# Patient Record
Sex: FEMALE | Race: WHITE | NOT HISPANIC OR LATINO | Employment: OTHER | URBAN - METROPOLITAN AREA
[De-identification: names, ages, dates, MRNs, and addresses within clinical notes are randomized per-mention and may not be internally consistent; named-entity substitution may affect disease eponyms.]

---

## 2020-08-10 ENCOUNTER — HOSPITAL ENCOUNTER (OUTPATIENT)
Dept: MAMMOGRAPHY | Facility: HOSPITAL | Age: 75
Discharge: HOME | End: 2020-08-10

## 2020-08-27 ENCOUNTER — HOSPITAL ENCOUNTER (OUTPATIENT)
Dept: MAMMOGRAPHY | Facility: HOSPITAL | Age: 75
Discharge: HOME | End: 2020-08-27

## 2020-08-27 ENCOUNTER — OFFICE VISIT CONVERTED (OUTPATIENT)
Dept: ORTHOPEDIC SURGERY | Facility: CLINIC | Age: 75
End: 2020-08-27
Attending: ORTHOPAEDIC SURGERY

## 2020-09-14 ENCOUNTER — HOSPITAL ENCOUNTER (OUTPATIENT)
Dept: OTHER | Facility: HOSPITAL | Age: 75
Discharge: HOME | End: 2020-09-14
Attending: ORTHOPAEDIC SURGERY

## 2020-09-22 ENCOUNTER — OFFICE VISIT CONVERTED (OUTPATIENT)
Dept: ORTHOPEDIC SURGERY | Facility: CLINIC | Age: 75
End: 2020-09-22
Attending: ORTHOPAEDIC SURGERY

## 2021-01-12 ENCOUNTER — OFFICE VISIT CONVERTED (OUTPATIENT)
Dept: ORTHOPEDIC SURGERY | Facility: CLINIC | Age: 76
End: 2021-01-12
Attending: ORTHOPAEDIC SURGERY

## 2021-01-12 ENCOUNTER — CONVERSION ENCOUNTER (OUTPATIENT)
Dept: ORTHOPEDIC SURGERY | Facility: CLINIC | Age: 76
End: 2021-01-12

## 2021-02-03 ENCOUNTER — CONVERSION ENCOUNTER (OUTPATIENT)
Dept: PREADMISSION TESTING | Facility: HOSPITAL | Age: 76
End: 2021-02-03

## 2021-02-03 ENCOUNTER — HOSPITAL ENCOUNTER (OUTPATIENT)
Dept: FAMILY MEDICINE CLINIC | Facility: CLINIC | Age: 76
Discharge: HOME | End: 2021-02-03
Attending: FAMILY MEDICINE

## 2021-02-03 LAB — SARS-COV-2 RNA SPEC QL NAA+PROBE: NOT DETECTED

## 2021-02-08 ENCOUNTER — HOSPITAL ENCOUNTER (OUTPATIENT)
Dept: PERIOP | Facility: HOSPITAL | Age: 76
Setting detail: HOSPITAL OUTPATIENT SURGERY
Discharge: HOME | End: 2021-02-08
Attending: ORTHOPAEDIC SURGERY

## 2021-02-23 ENCOUNTER — OFFICE VISIT CONVERTED (OUTPATIENT)
Dept: ORTHOPEDIC SURGERY | Facility: CLINIC | Age: 76
End: 2021-02-23
Attending: ORTHOPAEDIC SURGERY

## 2021-03-23 ENCOUNTER — OFFICE VISIT CONVERTED (OUTPATIENT)
Dept: ORTHOPEDIC SURGERY | Facility: CLINIC | Age: 76
End: 2021-03-23
Attending: ORTHOPAEDIC SURGERY

## 2021-05-04 ENCOUNTER — CONVERSION ENCOUNTER (OUTPATIENT)
Dept: OTHER | Facility: HOSPITAL | Age: 76
End: 2021-05-04

## 2021-05-04 ENCOUNTER — OFFICE VISIT CONVERTED (OUTPATIENT)
Dept: ORTHOPEDIC SURGERY | Facility: CLINIC | Age: 76
End: 2021-05-04
Attending: PHYSICIAN ASSISTANT

## 2021-05-10 NOTE — H&P
History and Physical      Patient Name: Noni Bhardwaj   Patient ID: [id number]   Sex: Female   YOB: 1945        Visit Date: August 27, 2020    Provider: Linus Ngo MD   Location: [facility]   Location Address: [address] [place] [address]  [id number]   Location Phone: [phone]          Chief Complaint  · Right shoulder pain      History Of Present Illness  Noni Bhardwaj is a 75 year old /White female who presents today to [facility].      The patient presents today for evaluation of right shoulder pain. Patient reports having a partial tear of her RTC for some time. She is from [place] and had injections there which helped her pain. She reports moving here to have help with her  who has dementia. She reports trying to use a chainsaw recently and has had increasing pain, this has not improved. She reports pain over the posterolateral shoulder and anterior shoulder. She reports the last MRI she had was in December in [place].                      Past Surgical History  Appendectomy; Back; Colonoscopy; Hysterectomy         Medication List  carisoprodol-aspirin 200-325 mg oral tablet; Eliquis oral; levothyroxine oral         Allergy List  NO KNOWN DRUG ALLERGIES; NO KNOWN DRUG ALLERGIES       Allergies Reconciled  Social History  Alcohol Use (Current some day); lives with spouse; .; Recreational Drug Use (Never); Retired.; Tobacco (Never)         Review of Systems  · Constitutional  o Denies  o : fever, chills, weight loss  · Cardiovascular  o Denies  o : chest pain, shortness of breath  · Gastrointestinal  o Denies  o : liver disease, heartburn, nausea, blood in stools  · Genitourinary  o Denies  o : painful urination, blood in urine  · Integument  o Denies  o : rash, itching  · Neurologic  o Denies  o : headache, weakness, loss of consciousness  · Musculoskeletal  o Denies  o : painful, swollen joints  · Psychiatric  o Denies  o : drug/alcohol  "addiction, anxiety, depression      Vitals  Date Time BP Position Site L\R Cuff Size HR RR TEMP (F) WT  HT  BMI kg/m2 BSA m2 O2 Sat        08/27/2020 09:57 AM         156lbs 4oz 5'  2\" 28.58 1.76           Physical Examination  · Constitutional  o Appearance  o : well developed, well-nourished, no obvious deformities present  · Head and Face  o Head  o :   § Inspection  § : normocephalic  o Face  o :   § Inspection  § : no facial lesions  · Eyes  o Conjunctivae  o : conjunctivae normal  o Sclerae  o : sclerae white  · Ears, Nose, Mouth and Throat  o Ears  o :   § External Ears  § : appearance within normal limits  § Hearing  § : intact  o Nose  o :   § External Nose  § : appearance normal  · Neck  o Inspection/Palpation  o : normal appearance  o Range of Motion  o : full range of motion  · Respiratory  o Respiratory Effort  o : breathing unlabored  o Inspection of Chest  o : normal appearance  o Auscultation of Lungs  o : no audible wheezing or rales  · Cardiovascular  o Heart  o : regular rate  · Gastrointestinal  o Abdominal Examination  o : soft and non-tender  · Skin and Subcutaneous Tissue  o General Inspection  o : intact, no rashes  · Psychiatric  o General  o : Alert and oriented x3  o Judgement and Insight  o : judgment and insight intact  o Mood and Affect  o : mood normal, affect appropriate  · Right Shoulder  o Inspection  o : Non-tender to palpation, no swelling or atrophy, forward elevation active 90 degrees, passive forward elevation 165, abduction 100 degrees, ER 65, and IR 30 degrees, Positive impingement signs, IR to SI joint, Neurovascularly intact, sensation intact, 5/5 infraspinatus and subscapularis, supraspinatus 4+/5, pain with cross arm adduction  · Injection Note/Aspiration Note  o Site  o : right shoulder  o Procedure  o : Procedure: After educating the patient, patient gave consent for procedure. After using Chloraprep, the joint space was injected. The patient tolerated the procedure " well.  o Medication  o : 80 mg of DepoMedrol with 9cc of 1% Lidocaine  · In Office Procedures  o View  o : AP/LATERAL  o Site  o : right shoulder   o Indication  o : right shoulder pain  o Study  o : X-rays ordered, taken in the office, and reviewed today.  o Xray  o : Reveals calcification 1cm x 3cm adjacent to the lateral acromion, advanced degenerative changes to the acromioclavicular joint, calcification 2cm x 1cm adjacent to proximal humerus  o Comparative Data  o : No comparative data found          Assessment  · Primary osteoarthritis of shoulder     715.11/M19.019  · Right shoulder pain, unspecified chronicity     719.41/M25.511  · Rotator cuff tear     840.4/M75.100      Plan  · Orders  o Depo-Medrol injection 80mg () - - 08/27/2020   Lot [id number] Exp 07 2021 [employer] Administered by BIANCA REN MD   o Shoulder Intra-articular Injection without US Guidance [facility] (23342) - - 08/27/2020   Lot [id number] Exp 08 01 2021 [employer] Administered by BIANCA REN MD   o Scapula (Right) [facility] Preferred View (11369-VH) - 719.41/M25.511 - 08/27/2020  · Medications  o Medications have been Reconciled  o Transition of Care or Provider Policy  · Instructions  o Reviewed the patient's Past Medical, Social, and Family history as well as the ROS at today's visit, no changes.  o Call or return if worsening symptoms.  o X-ray ordered, taken and reviewed at this visit.  o The above service was scribed by Kendal Bardales on my behalf and I attest to the accuracy of the note. jsb  o Discussed treatment options. We recommended an MRI to evaluate the RTC and injection today to help with her pain. She expressed understanding and wished to proceed with injection and MRI. She will follow-up after MRI to discuss results.             Electronically Signed by: Kendal Bardales-, Other -Author on August 27, 2020 10:59:42 AM  Electronically Co-signed by: Bianca Ren MD -Reviewer on August 27, 2020  08:56:35 PM

## 2021-05-13 NOTE — PROGRESS NOTES
Progress Note      Patient Name: Noni Bhardwaj   Patient ID: [id number]   Sex: Female   YOB: 1945        Visit Date: September 22, 2020    Provider: Linus Ngo MD   Location: [facility]   Location Address: [address] [place] [address]  [id number]   Location Phone: [phone]          Chief Complaint  · Right shoulder pain      History Of Present Illness  Noni Bhardwaj is a 75 year old /White female who presents today to [facility].      The patient presents here today for follow up evaluation of right shoulder pain. We previously gave the patient an injection in her right shoulder, she states the injection improved her pain. She is here today for her MRI results, that revealed a Rotator cuff tear. She has received injections in the past in [place], where she lived before. She states the injections usually last her up about 6 months.           Past Surgical History  Appendectomy; Back; Colonoscopy; Hysterectomy         Medication List  carisoprodol-aspirin 200-325 mg oral tablet; Eliquis oral; levothyroxine oral         Allergy List  NO KNOWN DRUG ALLERGIES; NO KNOWN DRUG ALLERGIES       Allergies Reconciled  Social History  Alcohol Use (Current some day); lives with spouse; .; Recreational Drug Use (Never); Retired.; Tobacco (Never)         Review of Systems  · Constitutional  o Denies  o : fever, chills, weight loss  · Cardiovascular  o Denies  o : chest pain, shortness of breath  · Gastrointestinal  o Denies  o : liver disease, heartburn, nausea, blood in stools  · Genitourinary  o Denies  o : painful urination, blood in urine  · Integument  o Denies  o : rash, itching  · Neurologic  o Denies  o : headache, weakness, loss of consciousness  · Musculoskeletal  o Denies  o : painful, swollen joints  · Psychiatric  o Denies  o : drug/alcohol addiction, anxiety, depression      Vitals  Date Time BP Position Site L\R Cuff Size HR RR TEMP (F) WT  HT   "BMI kg/m2 BSA m2 O2 Sat        09/22/2020 10:43 AM         156lbs 4oz 5'  2\" 28.58 1.76           Physical Examination  · Constitutional  o Appearance  o : well developed, well-nourished, no obvious deformities present  · Head and Face  o Head  o :   § Inspection  § : normocephalic  o Face  o :   § Inspection  § : no facial lesions  · Eyes  o Conjunctivae  o : conjunctivae normal  o Sclerae  o : sclerae white  · Ears, Nose, Mouth and Throat  o Ears  o :   § External Ears  § : appearance within normal limits  § Hearing  § : intact  o Nose  o :   § External Nose  § : appearance normal  · Neck  o Inspection/Palpation  o : normal appearance  o Range of Motion  o : full range of motion  · Respiratory  o Respiratory Effort  o : breathing unlabored  o Inspection of Chest  o : normal appearance  o Auscultation of Lungs  o : no audible wheezing or rales  · Cardiovascular  o Heart  o : regular rate  · Gastrointestinal  o Abdominal Examination  o : soft and non-tender  · Skin and Subcutaneous Tissue  o General Inspection  o : intact, no rashes  · Psychiatric  o General  o : Alert and oriented x3  o Judgement and Insight  o : judgment and insight intact  o Mood and Affect  o : mood normal, affect appropriate  · Right Shoulder  o Inspection  o : Non-tender to palpation, no swelling or atrophy, forward elevation active 90 degrees, passive forward elevation 165, abduction 100 degrees, ER 65, and IR 30 degrees, Positive impingement signs, IR to SI joint, Neurovascularly intact, sensation intact, 5/5 infraspinatus and subscapularis, supraspinatus 4+/5, pain with cross arm adduction           Assessment  · Right shoulder pain, unspecified chronicity     719.41/M25.511  · Rotator cuff tear, right     840.4/M75.101      Plan  · Medications  o Medications have been Reconciled  o Transition of Care or Provider Policy  · Instructions  o MRI reviewed by Dr. Ngo.  o Reviewed the patient's Past Medical, Social, and Family history " as well as the ROS at today's visit, no changes.  o Call or return if worsening symptoms.  o Follow up as needed.  o The above service was scribed by Kristen Machado on my behalf and I attest to the accuracy of the note. jsb  o Discussed operative treatment options vs non operative treatment options. The patient wished to proceed with conservative treatment and continue getting injections as long as it is helping. Due to patient being on Eliquis, and not being able to take anti-inflammatories, a prescription for tramadol was given today. Follow up as needed for another injection.   o Electronically Identified Patient Education Materials Provided Electronically            Electronically Signed by: Kristen Machado MA -Author on September 22, 2020 10:58:38 AM  Electronically Co-signed by: Linus Ngo MD -Reviewer on September 22, 2020 09:24:35 PM

## 2021-05-14 VITALS — HEIGHT: 62 IN | WEIGHT: 156.25 LBS | BODY MASS INDEX: 28.75 KG/M2

## 2021-05-14 VITALS — OXYGEN SATURATION: 94 % | BODY MASS INDEX: 26.46 KG/M2 | HEIGHT: 63 IN | WEIGHT: 149.37 LBS | HEART RATE: 61 BPM

## 2021-05-14 VITALS — HEIGHT: 63 IN | BODY MASS INDEX: 25.75 KG/M2 | WEIGHT: 145.31 LBS

## 2021-05-14 VITALS — HEIGHT: 63 IN | BODY MASS INDEX: 27.11 KG/M2 | OXYGEN SATURATION: 98 % | HEART RATE: 80 BPM | WEIGHT: 153 LBS

## 2021-05-14 VITALS — WEIGHT: 156.25 LBS | HEIGHT: 62 IN | BODY MASS INDEX: 28.75 KG/M2

## 2021-05-14 NOTE — PROGRESS NOTES
Progress Note      Patient Name: Noni Bhardwaj   Patient ID: [id number]   Sex: Female   YOB: 1945        Visit Date: January 12, 2021    Provider: Linus Ngo MD   Location: [facility]   Location Address: [address] [place] [address]  [id number]   Location Phone: [phone]          Chief Complaint  · Right shoulder pain       History Of Present Illness  Noni Bharwdaj is a 75 year old /White female who presents today to [facility].      The patient presents here today for follow up evaluation of Right shoulder.  The patient has been getting injections in her shoulder for her rotator cuff tear. Patient states her shoulder is bothering her more. She can't sleep on that side at night. She states she is ready to get it fixed. She does see a cardiologist.            Past Surgical History  Appendectomy; Back; Colonoscopy; Hysterectomy         Medication List  carisoprodol-aspirin 200-325 mg oral tablet; Eliquis oral; levothyroxine oral; Ultram 50 mg oral tablet         Allergy List  NO KNOWN DRUG ALLERGIES; NO KNOWN DRUG ALLERGIES       Allergies Reconciled  Social History  Alcohol Use (Current some day); lives with spouse; .; Recreational Drug Use (Never); Retired.; Tobacco (Never)         Review of Systems  · Constitutional  o Denies  o : fever, chills, weight loss  · Cardiovascular  o Denies  o : chest pain, shortness of breath  · Gastrointestinal  o Denies  o : liver disease, heartburn, nausea, blood in stools  · Genitourinary  o Denies  o : painful urination, blood in urine  · Integument  o Denies  o : rash, itching  · Neurologic  o Denies  o : headache, weakness, loss of consciousness  · Musculoskeletal  o Denies  o : painful, swollen joints  · Psychiatric  o Denies  o : drug/alcohol addiction, anxiety, depression      Vitals  Date Time BP Position Site L\R Cuff Size HR RR TEMP (F) WT  HT  BMI kg/m2 BSA m2 O2 Sat FR L/min FiO2 HC       01/12/2021  "03:05 PM      80 - R   153lbs 0oz 5'  3\" 27.1 1.76 98 %            Physical Examination  · Constitutional  o Appearance  o : well developed, well-nourished, no obvious deformities present  · Head and Face  o Head  o :   § Inspection  § : normocephalic  o Face  o :   § Inspection  § : no facial lesions  · Eyes  o Conjunctivae  o : conjunctivae normal  o Sclerae  o : sclerae white  · Ears, Nose, Mouth and Throat  o Ears  o :   § External Ears  § : appearance within normal limits  § Hearing  § : intact  o Nose  o :   § External Nose  § : appearance normal  · Neck  o Inspection/Palpation  o : normal appearance  o Range of Motion  o : full range of motion  · Respiratory  o Respiratory Effort  o : breathing unlabored  o Inspection of Chest  o : normal appearance  o Auscultation of Lungs  o : no audible wheezing or rales  · Cardiovascular  o Heart  o : regular rate  · Gastrointestinal  o Abdominal Examination  o : soft and non-tender  · Skin and Subcutaneous Tissue  o General Inspection  o : intact, no rashes  · Psychiatric  o General  o : Alert and oriented x3  o Judgement and Insight  o : judgment and insight intact  o Mood and Affect  o : mood normal, affect appropriate  · Right Shoulder  o Inspection  o : Non-tender to palpation, no swelling or atrophy, forward elevation active 90 degrees, passive forward elevation 165, abduction 100 degrees, ER 65, and IR 30 degrees, Positive impingement signs, IR to SI joint, Neurovascularly intact, sensation intact, 5/5 infraspinatus and subscapularis, supraspinatus 4+/5, pain with cross arm adduction           Assessment  · Right shoulder pain, unspecified chronicity     719.41/M25.511  · Right Rotator cuff tear     840.4/M75.100      Plan  · Medications  o Medications have been Reconciled  o Transition of Care or Provider Policy  · Instructions  o Reviewed the patient's Past Medical, Social, and Family history as well as the ROS at today's visit, no changes.  o Call or return if " worsening symptoms.  o Discussed surgery.  o Risks/benefits discussed with patient including, but not limited to: infection, bleeding, neurovascular damage, malunion, nonunion, aesthetic deformity, need for further surgery, and death.  o Surgery pamphlet given.  o The above service was scribed by Kristen Machado on my behalf and I attest to the accuracy of the note. jsb  o Discussed the risks and benefits of operative treatment. The patient expressed understanding and wished to proceed. Plan for a right shoulder arthroscopic vs mini open rotator cuff repair, SAD, with possible biceps tenodesis. Follow up 2 weeks post-op.   o Electronically Identified Patient Education Materials Provided Electronically            Electronically Signed by: Kristen Mcahado MA -Author on January 12, 2021 03:47:06 PM  Electronically Co-signed by: Linus Ngo MD -Reviewer on January 12, 2021 09:03:53 PM

## 2021-05-14 NOTE — PROGRESS NOTES
Progress Note      Patient Name: Noni Bhardwaj   Patient ID: [id number]   Sex: Female   YOB: 1945        Visit Date: March 23, 2021    Provider: Linus Ngo MD   Location: [facility]   Location Address: [address] [place] [address]  [id number]   Location Phone: [phone]          Chief Complaint  · Right shoulder pain      History Of Present Illness  Noni Bhardwaj is a 76 year old /White female who presents today to [facility].      The patient presents here today for follow up evaluation of her right shoulder. She is S/P Right shoulder arthroscopic subacromial decompression, biceps tenotomy, mini open rotator cuff repair, 2/8/2021.  The patient states she has been wearing the sling. She states she has been attending physical therapy. The patient reports she is still having some pain with Elevation. She has no other complaints today.  She is not taking any pain medication.       Past Surgical History  Appendectomy; Back; Colonoscopy; Hysterectomy         Medication List  carisoprodol-aspirin 200-325 mg oral tablet; Eliquis oral; levothyroxine oral; Ultram 50 mg oral tablet         Allergy List  NO KNOWN DRUG ALLERGIES; NO KNOWN DRUG ALLERGIES       Allergies Reconciled  Social History  Alcohol Use (Current some day); lives with spouse; .; Recreational Drug Use (Never); Retired.; Tobacco (Never)         Review of Systems  · Constitutional  o Denies  o : fever, chills, weight loss  · Cardiovascular  o Denies  o : chest pain, shortness of breath  · Gastrointestinal  o Denies  o : liver disease, heartburn, nausea, blood in stools  · Genitourinary  o Denies  o : painful urination, blood in urine  · Integument  o Denies  o : rash, itching  · Neurologic  o Denies  o : headache, weakness, loss of consciousness  · Musculoskeletal  o Denies  o : painful, swollen joints  · Psychiatric  o Denies  o : drug/alcohol addiction, anxiety,  "depression      Vitals  Date Time BP Position Site L\R Cuff Size HR RR TEMP (F) WT  HT  BMI kg/m2 BSA m2 O2 Sat FR L/min FiO2 HC       03/23/2021 10:10 AM         145lbs 5oz 5'  3\" 25.74 1.71             Physical Examination  · Constitutional  o Appearance  o : well developed, well-nourished, no obvious deformities present  · Head and Face  o Head  o :   § Inspection  § : normocephalic  o Face  o :   § Inspection  § : no facial lesions  · Eyes  o Conjunctivae  o : conjunctivae normal  o Sclerae  o : sclerae white  · Ears, Nose, Mouth and Throat  o Ears  o :   § External Ears  § : appearance within normal limits  § Hearing  § : intact  o Nose  o :   § External Nose  § : appearance normal  · Neck  o Inspection/Palpation  o : normal appearance  o Range of Motion  o : full range of motion  · Respiratory  o Respiratory Effort  o : breathing unlabored  o Inspection of Chest  o : normal appearance  o Auscultation of Lungs  o : no audible wheezing or rales  · Cardiovascular  o Heart  o : regular rate  · Gastrointestinal  o Abdominal Examination  o : soft and non-tender  · Skin and Subcutaneous Tissue  o General Inspection  o : intact, no rashes  · Psychiatric  o General  o : Alert and oriented x3  o Judgement and Insight  o : judgment and insight intact  o Mood and Affect  o : mood normal, affect appropriate  · Right Shoulder  o Inspection  o : Incisions well healing. Sensation to light touch median, radial, ulnar nerve. Positive AIN, PIN, ulnar nerve. Positive pulses. Neurovascularly intact. FE 90. Abduction 90. ER 40.           Assessment  · Aftercare following Right shoulder arthroscopic subacromial decompression, biceps tenotomy, mini open rotator cuff repair     V54.81  · Right shoulder pain, unspecified chronicity     719.41/M25.511      Plan  · Medications  o Medications have been Reconciled  o Transition of Care or Provider Policy  · Instructions  o Reviewed the patient's Past Medical, Social, and Family history as " well as the ROS at today's visit, no changes.  o Call or return if worsening symptoms.  o The above service was scribed by Kristen Machado on my behalf and I attest to the accuracy of the note. jsb  o Discussed the treatment plan with the patient. Plan to continue with more aggressive physical therapy. She can remove the sling, no lifting. Follow up in 6 weeks to recheck.  o Electronically Identified Patient Education Materials Provided Electronically            Electronically Signed by: Kristen Machado MA -Author on March 23, 2021 10:54:33 AM  Electronically Co-signed by: Linus Ngo MD -Reviewer on March 23, 2021 08:00:53 PM

## 2021-05-14 NOTE — PROGRESS NOTES
Progress Note      Patient Name: Noni Bhardwaj   Patient ID: [id number]   Sex: Female   YOB: 1945        Visit Date: May 4, 2021    Provider: Hardik Lopez PA-C   Location: [facility]   Location Address: [address] [place] [address]  [id number]   Location Phone: [phone]          Chief Complaint  · Follow up right shoulder surgery      History Of Present Illness  Noni Bhardwaj is a 76 year old /White female who presents today to [facility]. Patient presents for follow-up evaluation of right shoulder arthroscopic subacromial decompression, bicep tenotomy, mini open rotator cuff repair, 2/8/2021. Patient states she has been doing therapy with good progress, she states her range of motion and strength have improved, she brings a therapy note that states that patient has been discharged from therapy and will now do home exercise program. Patient is in agreement with this plan. Patient denies pain in the shoulder, states she has good use of the shoulder with activities of daily living.       Past Surgical History  Appendectomy; Back; Colonoscopy; Hysterectomy         Medication List  carisoprodol-aspirin 200-325 mg oral tablet; Eliquis oral; levothyroxine oral; Ultram 50 mg oral tablet         Allergy List  NO KNOWN DRUG ALLERGIES; NO KNOWN DRUG ALLERGIES       Allergies Reconciled  Social History  Alcohol Use (Current some day); lives with spouse; .; Recreational Drug Use (Never); Retired.; Tobacco (Never)         Review of Systems  · Constitutional  o Denies  o : fever, chills, weight loss  · Cardiovascular  o Denies  o : chest pain, shortness of breath  · Gastrointestinal  o Denies  o : liver disease, heartburn, nausea, blood in stools  · Genitourinary  o Denies  o : painful urination, blood in urine  · Integument  o Denies  o : rash, itching  · Neurologic  o Denies  o : headache, weakness, loss of  "consciousness  · Musculoskeletal  o Denies  o : painful, swollen joints  · Psychiatric  o Denies  o : drug/alcohol addiction, anxiety, depression      Vitals  Date Time BP Position Site L\R Cuff Size HR RR TEMP (F) WT  HT  BMI kg/m2 BSA m2 O2 Sat FR L/min FiO2 HC       05/04/2021 10:17 AM      74 - R   145lbs 0oz 5'  3\" 25.69 1.71 98 %            Physical Examination  · Constitutional  o Appearance  o : well developed, well-nourished, no obvious deformities present  · Head and Face  o Head  o :   § Inspection  § : normocephalic  o Face  o :   § Inspection  § : no facial lesions  · Eyes  o Conjunctivae  o : conjunctivae normal  o Sclerae  o : sclerae white  · Ears, Nose, Mouth and Throat  o Ears  o :   § External Ears  § : appearance within normal limits  § Hearing  § : intact  o Nose  o :   § External Nose  § : appearance normal  · Neck  o Inspection/Palpation  o : normal appearance  o Range of Motion  o : full range of motion  · Respiratory  o Respiratory Effort  o : breathing unlabored  o Inspection of Chest  o : normal appearance  o Auscultation of Lungs  o : no audible wheezing or rales  · Cardiovascular  o Heart  o : regular rate  · Gastrointestinal  o Abdominal Examination  o : soft and non-tender  · Skin and Subcutaneous Tissue  o General Inspection  o : intact, no rashes  · Psychiatric  o General  o : Alert and oriented x3  o Judgement and Insight  o : judgment and insight intact  o Mood and Affect  o : mood normal, affect appropriate  · Right Shoulder  o Inspection  o : Incisions are well-healed, no erythema, no ecchymosis, no swelling, no signs of infection, nontender to palpation, active forward elevation 155, active abduction 120, 5 out of 5 rotator cuff strength, external rotation with abduction 75, internal rotation to T12, no pain with range of motion.          Assessment  · Aftercare following surgery of right shoulder arthroscopic subacromial decompression, bicep tenotomy, mini open rotator cuff " repair, 2/8/2021     V54.81  · Pain: Shoulder     719.41/M25.519      Plan  · Medications  o Medications have been Reconciled  o Transition of Care or Provider Policy  · Instructions  o Reviewed the patient's Past Medical, Social, and Family history as well as the ROS at today's visit, no changes.  o Call or return if worsening symptoms.  o Follow up in 6-8 weeks.  o Advised patient to continue working on strength and range of motion with home exercise program, continue activity as tolerated, follow-up in 6 to 8 weeks for reevaluation.            Electronically Signed by: Hardik Lopez PA-C -Author on May 4, 2021 10:33:33 AM  Electronically Co-signed by: Linus Ngo MD -Reviewer on May 4, 2021 10:28:56 PM

## 2021-05-14 NOTE — PROGRESS NOTES
Progress Note      Patient Name: Noni Bhardwaj   Patient ID: [id number]   Sex: Female   YOB: 1945        Visit Date: February 23, 2021    Provider: Linus Ngo MD   Location: [facility]   Location Address: [address] [place] [address]  [id number]   Location Phone: [phone]          Chief Complaint  · Right shoulder pain       History Of Present Illness  Noni Bhardwaj is a 75 year old /White female who presents today to [facility].      The patient presents here today for follow up evaluation of her right shoulder. She is S/P Right shoulder arthroscopic subacromial decompression, biceps tenotomy, mini open rotator cuff repair, 2/8/2021.  Her sutures were removed today. She is wearing her sling today. She has been attending physical therapy. She is overall doing well today.       Past Surgical History  Appendectomy; Back; Colonoscopy; Hysterectomy         Medication List  carisoprodol-aspirin 200-325 mg oral tablet; Eliquis oral; levothyroxine oral; Ultram 50 mg oral tablet         Allergy List  NO KNOWN DRUG ALLERGIES; NO KNOWN DRUG ALLERGIES       Allergies Reconciled  Social History  Alcohol Use (Current some day); lives with spouse; .; Recreational Drug Use (Never); Retired.; Tobacco (Never)         Review of Systems  · Constitutional  o Denies  o : fever, chills, weight loss  · Cardiovascular  o Denies  o : chest pain, shortness of breath  · Gastrointestinal  o Denies  o : liver disease, heartburn, nausea, blood in stools  · Genitourinary  o Denies  o : painful urination, blood in urine  · Integument  o Denies  o : rash, itching  · Neurologic  o Denies  o : headache, weakness, loss of consciousness  · Musculoskeletal  o Denies  o : painful, swollen joints  · Psychiatric  o Denies  o : drug/alcohol addiction, anxiety, depression      Vitals  Date Time BP Position Site L\R Cuff Size HR RR TEMP (F) WT  HT  BMI kg/m2 BSA m2 O2 Sat FR L/min FiO2 HC  "      02/23/2021 02:33 PM      61 - R   149lbs 6oz 5'  3\" 26.46 1.74 94 %            Physical Examination  · Constitutional  o Appearance  o : well developed, well-nourished, no obvious deformities present  · Head and Face  o Head  o :   § Inspection  § : normocephalic  o Face  o :   § Inspection  § : no facial lesions  · Eyes  o Conjunctivae  o : conjunctivae normal  o Sclerae  o : sclerae white  · Ears, Nose, Mouth and Throat  o Ears  o :   § External Ears  § : appearance within normal limits  § Hearing  § : intact  o Nose  o :   § External Nose  § : appearance normal  · Neck  o Inspection/Palpation  o : normal appearance  o Range of Motion  o : full range of motion  · Respiratory  o Respiratory Effort  o : breathing unlabored  o Inspection of Chest  o : normal appearance  o Auscultation of Lungs  o : no audible wheezing or rales  · Cardiovascular  o Heart  o : regular rate  · Gastrointestinal  o Abdominal Examination  o : soft and non-tender  · Skin and Subcutaneous Tissue  o General Inspection  o : intact, no rashes  · Psychiatric  o General  o : Alert and oriented x3  o Judgement and Insight  o : judgment and insight intact  o Mood and Affect  o : mood normal, affect appropriate  · Right Shoulder  o Inspection  o : ROM limited secondary to pain. Neurovascularly intact. Sensation intact. Incision well healing. No signs of infection or drainage. Mild bruising           Assessment  · Aftercare following Right shoulder arthroscopic subacromial decompression, biceps tenotomy, mini open rotator cuff repair,     V54.81  · Right shoulder pain, unspecified chronicity     719.41/M25.511      Plan  · Medications  o Medications have been Reconciled  o Transition of Care or Provider Policy  · Instructions  o Reviewed the patient's Past Medical, Social, and Family history as well as the ROS at today's visit, no changes.  o Call or return if worsening symptoms.  o The above service was scribed by Kristen Machado on my " behalf and I attest to the accuracy of the note. jsb  o Discussed the treatment options with the patient. Follow up in 4 weeks. Continue physical therapy.   o Electronically Identified Patient Education Materials Provided Electronically            Electronically Signed by: Kristen Machado MA -Author on February 24, 2021 09:14:52 AM  Electronically Co-signed by: Linus Ngo MD -Reviewer on February 24, 2021 08:25:27 PM

## 2021-06-28 PROBLEM — Z47.89 AFTERCARE FOLLOWING SURGERY OF THE MUSCULOSKELETAL SYSTEM: Status: ACTIVE | Noted: 2021-06-28

## 2021-06-29 ENCOUNTER — OFFICE VISIT (OUTPATIENT)
Dept: ORTHOPEDIC SURGERY | Facility: CLINIC | Age: 76
End: 2021-06-29

## 2021-06-29 VITALS — WEIGHT: 151.4 LBS | HEIGHT: 63 IN | OXYGEN SATURATION: 99 % | HEART RATE: 55 BPM | BODY MASS INDEX: 26.82 KG/M2

## 2021-06-29 DIAGNOSIS — Z47.89 AFTERCARE FOLLOWING SURGERY OF THE MUSCULOSKELETAL SYSTEM: Primary | ICD-10-CM

## 2021-06-29 PROCEDURE — 99212 OFFICE O/P EST SF 10 MIN: CPT | Performed by: PHYSICIAN ASSISTANT

## 2021-06-29 RX ORDER — CARISOPRODOL AND ASPIRIN 200; 325 MG/1; MG/1
TABLET ORAL
COMMUNITY
End: 2022-08-22

## 2021-06-29 RX ORDER — LEVOTHYROXINE SODIUM 0.07 MG/1
TABLET ORAL
COMMUNITY
Start: 2021-04-10

## 2021-06-29 RX ORDER — APIXABAN 5 MG/1
5 TABLET, FILM COATED ORAL 2 TIMES DAILY
COMMUNITY
Start: 2021-04-10

## 2021-06-29 RX ORDER — METOPROLOL SUCCINATE 25 MG/1
12.5 TABLET, EXTENDED RELEASE ORAL DAILY
COMMUNITY
Start: 2021-06-07

## 2021-06-29 NOTE — PROGRESS NOTES
"Chief Complaint  Pain and Follow-up of the Right Shoulder    Subjective          Noni Bhardwaj is a 76 y.o. female  presents to [facility] for   History of Present Illness    Patient presents for follow-up evaluation of right shoulder arthroscopic subacromial decompression, biceps tenotomy, morning open rotator cuff repair, 2/8/2021.  Patient states she has been doing well she has stopped home exercise program she states her active lifestyle she uses the shoulder and gets enough movement and strengthening exercises.  Patient states her range of motion is back, she denies pain in the shoulder she states every now and then it has a twinge of pain otherwise she denies pain in the shoulder.  She denies difficulty with activities of daily living states she has full range of motion, no new complaints today.  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    Physical Exam    Right shoulder: Incisions are well-healed, no erythema, no ecchymosis, no swelling, no atrophy.  Nontender to palpation, no " pain with range of motion, active forward elevation 175, active abduction 110, external rotation with abduction 75, internal rotation to T10, 5 out of 5 rotator cuff strength    Procedures    Imaging Results (Most Recent)     None           Result Review :   The following data was reviewed by: HAN Cotto on 06/29/2021:               Assessment and Plan    Diagnoses and all orders for this visit:    1. Aftercare following surgery of right shoulder arthroscopic subacromial decompression, biceps tenotomy, mini open rotator cuff repair, 2/8/2021 (Primary)        Discussed diagnosis and treatment plan with patient, patient will continue activity as tolerated, follow-up as needed, she agrees.    Call or return if worsening symptoms.    Follow Up   No follow-ups on file.  Patient was given instructions and counseling regarding her condition or for health maintenance advice. Please see specific information pulled into the AVS if appropriate.

## 2021-07-15 VITALS — OXYGEN SATURATION: 98 % | WEIGHT: 145 LBS | HEIGHT: 63 IN | HEART RATE: 74 BPM | BODY MASS INDEX: 25.69 KG/M2

## 2021-07-26 ENCOUNTER — TRANSCRIBE ORDERS (OUTPATIENT)
Dept: ADMINISTRATIVE | Facility: HOSPITAL | Age: 76
End: 2021-07-26

## 2021-07-26 DIAGNOSIS — Z12.31 VISIT FOR SCREENING MAMMOGRAM: Primary | ICD-10-CM

## 2021-08-06 ENCOUNTER — OFFICE VISIT (OUTPATIENT)
Dept: ORTHOPEDIC SURGERY | Facility: CLINIC | Age: 76
End: 2021-08-06

## 2021-08-06 VITALS — HEART RATE: 50 BPM | BODY MASS INDEX: 26.54 KG/M2 | HEIGHT: 63 IN | WEIGHT: 149.8 LBS | OXYGEN SATURATION: 96 %

## 2021-08-06 DIAGNOSIS — M25.532 BILATERAL WRIST PAIN: Primary | ICD-10-CM

## 2021-08-06 DIAGNOSIS — M25.531 BILATERAL WRIST PAIN: Primary | ICD-10-CM

## 2021-08-06 DIAGNOSIS — M18.0 OSTEOARTHRITIS OF CARPOMETACARPAL (CMC) JOINTS OF BOTH THUMBS, UNSPECIFIED OSTEOARTHRITIS TYPE: ICD-10-CM

## 2021-08-06 PROCEDURE — 20600 DRAIN/INJ JOINT/BURSA W/O US: CPT | Performed by: ORTHOPAEDIC SURGERY

## 2021-08-06 PROCEDURE — 99214 OFFICE O/P EST MOD 30 MIN: CPT | Performed by: ORTHOPAEDIC SURGERY

## 2021-08-06 RX ORDER — ACETAMINOPHEN 500 MG
500 TABLET ORAL
COMMUNITY

## 2021-08-06 RX ORDER — DOFETILIDE 0.5 MG/1
500 CAPSULE ORAL 2 TIMES DAILY
COMMUNITY
Start: 2021-07-22 | End: 2023-01-19

## 2021-08-06 RX ADMIN — METHYLPREDNISOLONE ACETATE 80 MG: 80 INJECTION, SUSPENSION INTRA-ARTICULAR; INTRALESIONAL; INTRAMUSCULAR; SOFT TISSUE at 11:37

## 2021-08-06 RX ADMIN — LIDOCAINE HYDROCHLORIDE 1 ML: 10 INJECTION, SOLUTION INFILTRATION; PERINEURAL at 11:37

## 2021-08-06 NOTE — PROGRESS NOTES
"Chief Complaint  Pain of the Left Wrist and Pain of the Right Wrist     Subjective      Noni Bhardwaj presents to [facility] for evaluation of bilateral wrist pain. The patient takes Eliquis so she can't take Nsaids. She locates the pain at the base of her thumbs. She is right hand dominate. She has no other complaints.     No Known Allergies     Social History     Socioeconomic History   • Marital status:      Spouse name: Not on file   • Number of children: Not on file   • Years of education: Not on file   • Highest education level: Not on file   Tobacco Use   • Smoking status: Never Smoker   • Smokeless tobacco: Never Used   • Tobacco comment: 1/1/1900   Vaping Use   • Vaping Use: Never used   Substance and Sexual Activity   • Alcohol use: Yes     Comment: rarely drinks less than 1 drink per day, has been drinkig for 31 or more years    • Drug use: Never        Review of Systems     Objective   Vital Signs:   Pulse 50   Ht 160 cm (63\")   Wt 67.9 kg (149 lb 12.8 oz)   SpO2 96%   BMI 26.54 kg/m²       Physical Exam  Constitutional:       Appearance: Normal appearance. He is well-developed and normal weight.   HENT:      Head: Normocephalic.      Right Ear: Hearing and external ear normal.      Left Ear: Hearing and external ear normal.      Nose: Nose normal.   Eyes:      Conjunctiva/sclera: Conjunctivae normal.   Cardiovascular:      Rate and Rhythm: Normal rate.   Pulmonary:      Effort: Pulmonary effort is normal.      Breath sounds: No wheezing or rales.   Abdominal:      Palpations: Abdomen is soft.      Tenderness: There is no abdominal tenderness.   Musculoskeletal:      Cervical back: Normal range of motion.   Skin:     Findings: No rash.   Neurological:      Mental Status: He is alert and oriented to person, place, and time.   Psychiatric:         Mood and Affect: Mood and affect normal.         Judgment: Judgment normal.       Ortho Exam      bilateral wrist- " positive grind testing. Tender to the Thumb CMC joint. Neurovascularly intact. Sensation to light touch median, radial, ulnar nerve. Positive AIN, PIN, ulnar nerve. Positive pulses.     Small Joint Arthrocentesis  Consent given by: patient  Site marked: site marked  Timeout: Immediately prior to procedure a time out was called to verify the correct patient, procedure, equipment, support staff and site/side marked as required   Procedure Details  Location: thumb (RIGHT) -   Preparation: Patient was prepped and draped in the usual sterile fashion  Needle size: 25 G  Medications administered: 1 mL lidocaine 1 %; 80 mg methylPREDNISolone acetate 80 MG/ML  Patient tolerance: patient tolerated the procedure well with no immediate complications    Small Joint Arthrocentesis  Consent given by: patient  Site marked: site marked  Timeout: Immediately prior to procedure a time out was called to verify the correct patient, procedure, equipment, support staff and site/side marked as required   Supporting Documentation  Indications: pain   Procedure Details  Location: thumb (LEFT ) -   Preparation: Patient was prepped and draped in the usual sterile fashion  Needle size: 25 G  Medications administered: 1 mL lidocaine 1 %; 80 mg methylPREDNISolone acetate 80 MG/ML  Patient tolerance: patient tolerated the procedure well with no immediate complications          X-Ray Report:  Bilateral wrist(s) X-Ray  Indication: Evaluation of bilateral wrist pain  AP and Lateral view(s)  Findings: advanced degenerative changes to bilateral thumb CMC joints. No acute fracture.   Prior studies available for comparison: no           Imaging Results (Most Recent)     Procedure Component Value Units Date/Time    XR Wrist 2 View Bilateral [[id number]] Resulted: 08/06/21 1036     Updated: 08/06/21 1043           Result Review :       No results found.           Assessment and Plan     DX: bilateral thumb CMC osteoarthritis     Discussed the risks and  benefits of bilateral thumb injections. The patient expressed understanding and wished to proceed. She tolerated the injections well.     Call or return if worsening symptoms.    Follow Up     Follow up 3 months      Patient was given instructions and counseling regarding her condition or for health maintenance advice. Please see specific information pulled into the AVS if appropriate.     Scribed for Linus Ngo MD by Kristen Machado.  08/06/21   11:14 EDT  I have personally performed the services described in this document as scribed by the above individual and it is both accurate and complete. Linus Ngo MD 08/08/21

## 2021-08-08 RX ORDER — LIDOCAINE HYDROCHLORIDE 10 MG/ML
1 INJECTION, SOLUTION INFILTRATION; PERINEURAL
Status: COMPLETED | OUTPATIENT
Start: 2021-08-06 | End: 2021-08-06

## 2021-08-08 RX ORDER — METHYLPREDNISOLONE ACETATE 80 MG/ML
80 INJECTION, SUSPENSION INTRA-ARTICULAR; INTRALESIONAL; INTRAMUSCULAR; SOFT TISSUE
Status: COMPLETED | OUTPATIENT
Start: 2021-08-06 | End: 2021-08-06

## 2021-08-19 ENCOUNTER — OFFICE VISIT (OUTPATIENT)
Dept: ORTHOPEDIC SURGERY | Facility: CLINIC | Age: 76
End: 2021-08-19

## 2021-08-19 VITALS — WEIGHT: 149 LBS | HEIGHT: 63 IN | RESPIRATION RATE: 14 BRPM | BODY MASS INDEX: 26.4 KG/M2

## 2021-08-19 DIAGNOSIS — M18.0 OSTEOARTHRITIS OF CARPOMETACARPAL (CMC) JOINTS OF BOTH THUMBS, UNSPECIFIED OSTEOARTHRITIS TYPE: ICD-10-CM

## 2021-08-19 DIAGNOSIS — M25.532 BILATERAL WRIST PAIN: Primary | ICD-10-CM

## 2021-08-19 DIAGNOSIS — M65.4 TENDINITIS, DE QUERVAIN'S: ICD-10-CM

## 2021-08-19 DIAGNOSIS — M25.531 BILATERAL WRIST PAIN: Primary | ICD-10-CM

## 2021-08-19 PROCEDURE — 20605 DRAIN/INJ JOINT/BURSA W/O US: CPT | Performed by: ORTHOPAEDIC SURGERY

## 2021-08-19 PROCEDURE — 99213 OFFICE O/P EST LOW 20 MIN: CPT | Performed by: ORTHOPAEDIC SURGERY

## 2021-08-19 RX ADMIN — LIDOCAINE HYDROCHLORIDE 1 ML: 10 INJECTION, SOLUTION INFILTRATION; PERINEURAL at 15:01

## 2021-08-19 RX ADMIN — METHYLPREDNISOLONE ACETATE 80 MG: 80 INJECTION, SUSPENSION INTRA-ARTICULAR; INTRALESIONAL; INTRAMUSCULAR; SOFT TISSUE at 15:01

## 2021-08-19 NOTE — PROGRESS NOTES
"Chief Complaint  Pain of the Left Wrist and Pain of the Right Wrist     Subjective      Noni Bhardwaj presents to [facility] for follow up of bilateral wrist. More pain with left wrist. More thumb pain than wrist pain on right. Injection helped with right last visit but did not find significant relief with left wrist. Patient states she has been using moist heat on left wrist with no relief.    No Known Allergies     Social History     Socioeconomic History   • Marital status:      Spouse name: Not on file   • Number of children: Not on file   • Years of education: Not on file   • Highest education level: Not on file   Tobacco Use   • Smoking status: Never Smoker   • Smokeless tobacco: Never Used   • Tobacco comment: 1/1/1900   Vaping Use   • Vaping Use: Never used   Substance and Sexual Activity   • Alcohol use: Yes     Comment: rarely drinks less than 1 drink per day, has been drinkig for 31 or more years    • Drug use: Never        Review of Systems     Objective   Vital Signs:   Resp 14   Ht 160 cm (63\")   Wt 67.6 kg (149 lb)   BMI 26.39 kg/m²       Physical Exam  Constitutional:       Appearance: Normal appearance. He is well-developed and normal weight.   HENT:      Head: Normocephalic.      Right Ear: Hearing and external ear normal.      Left Ear: Hearing and external ear normal.      Nose: Nose normal.   Eyes:      Conjunctiva/sclera: Conjunctivae normal.   Cardiovascular:      Rate and Rhythm: Normal rate.   Pulmonary:      Effort: Pulmonary effort is normal.      Breath sounds: No wheezing or rales.   Abdominal:      Palpations: Abdomen is soft.      Tenderness: There is no abdominal tenderness.   Musculoskeletal:      Cervical back: Normal range of motion.   Skin:     Findings: No rash.   Neurological:      Mental Status: He is alert and oriented to person, place, and time.   Psychiatric:         Mood and Affect: Mood and affect normal.         Judgment: Judgment " normal.       Ortho Exam      General: Alert, no acute distress  Left upper extremity: tender to palpation over thumb cmc joint. postive grind test. Limited ROM of thumb secondary to pain. Sensation intact to light touch. Palpable radial pulse. Pain with flexion and extension of wrist. Limited ROM of wrist with pain.     Small Joint Arthrocentesis: L thumb CMC  Consent given by: patient  Site marked: site marked  Timeout: Immediately prior to procedure a time out was called to verify the correct patient, procedure, equipment, support staff and site/side marked as required   Procedure Details  Location: thumb - L thumb CMC  Preparation: Patient was prepped and draped in the usual sterile fashion  Needle gauge: 23 g.  Medications administered: 1 mL lidocaine 1 %; 80 mg methylPREDNISolone acetate 80 MG/ML  Patient tolerance: patient tolerated the procedure well with no immediate complications        Imaging Results (Most Recent)     None           Result Review :       XR Wrist 2 View Bilateral    Result Date: 8/6/2021  Narrative: X-Ray Report: Bilateral wrist(s) X-Ray Indication: Evaluation of bilateral wrist pain AP and Lateral view(s) Findings: advanced degenerative changes to bilateral thumb CMC joints. No acute fracture. 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 Reviewed various nonoperative measurements such as therapy, injections and antiinflammatories.  Discussed getting and MRI to further exam left wrist pain. Patient expressed understanding and wished to proceed with another wrist injection today. If no relief, then will proceed with MRI at a later date. Discussed the risks and benefits with the patient. Patient tolerated well with no complications.         Follow Up     Follow up in 6-8 weeks    Patient was given instructions and counseling regarding  her condition or for health maintenance advice. Please see specific information pulled into the AVS if appropriate.     Scribed for Linus Ngo MD by Kimberly Sparks MA.  08/19/21   14:28 EDT    I have personally performed the services described in this document as scribed by the above individual and it is both accurate and complete. Linus Ngo MD 08/22/21

## 2021-08-20 PROBLEM — M65.4 TENDINITIS, DE QUERVAIN'S: Status: ACTIVE | Noted: 2021-08-20

## 2021-08-22 RX ORDER — METHYLPREDNISOLONE ACETATE 80 MG/ML
80 INJECTION, SUSPENSION INTRA-ARTICULAR; INTRALESIONAL; INTRAMUSCULAR; SOFT TISSUE
Status: COMPLETED | OUTPATIENT
Start: 2021-08-19 | End: 2021-08-19

## 2021-08-22 RX ORDER — LIDOCAINE HYDROCHLORIDE 10 MG/ML
1 INJECTION, SOLUTION INFILTRATION; PERINEURAL
Status: COMPLETED | OUTPATIENT
Start: 2021-08-19 | End: 2021-08-19

## 2021-10-08 ENCOUNTER — HOSPITAL ENCOUNTER (OUTPATIENT)
Dept: MAMMOGRAPHY | Facility: HOSPITAL | Age: 76
Discharge: HOME | End: 2021-10-08
Admitting: FAMILY MEDICINE

## 2021-10-08 DIAGNOSIS — Z12.31 VISIT FOR SCREENING MAMMOGRAM: ICD-10-CM

## 2021-10-08 PROCEDURE — 77063 BREAST TOMOSYNTHESIS BI: CPT

## 2021-10-08 PROCEDURE — 77067 SCR MAMMO BI INCL CAD: CPT

## 2022-01-14 ENCOUNTER — OFFICE VISIT (OUTPATIENT)
Dept: ORTHOPEDIC SURGERY | Facility: CLINIC | Age: 77
End: 2022-01-14

## 2022-01-14 VITALS — HEIGHT: 63 IN | OXYGEN SATURATION: 98 % | WEIGHT: 151.6 LBS | BODY MASS INDEX: 26.86 KG/M2 | HEART RATE: 84 BPM

## 2022-01-14 DIAGNOSIS — M25.551 RIGHT HIP PAIN: Primary | ICD-10-CM

## 2022-01-14 DIAGNOSIS — M70.61 TROCHANTERIC BURSITIS OF RIGHT HIP: ICD-10-CM

## 2022-01-14 PROCEDURE — 99213 OFFICE O/P EST LOW 20 MIN: CPT | Performed by: ORTHOPAEDIC SURGERY

## 2022-01-14 PROCEDURE — 20610 DRAIN/INJ JOINT/BURSA W/O US: CPT | Performed by: ORTHOPAEDIC SURGERY

## 2022-01-14 RX ADMIN — TRIAMCINOLONE ACETONIDE 40 MG: 40 INJECTION, SUSPENSION INTRA-ARTICULAR; INTRAMUSCULAR at 11:10

## 2022-01-14 RX ADMIN — BUPIVACAINE HYDROCHLORIDE 5 ML: 2.5 INJECTION, SOLUTION INFILTRATION; PERINEURAL at 11:10

## 2022-01-14 NOTE — PROGRESS NOTES
"Chief Complaint  Pain of the Right Hip     Subjective      Noni Bhardwaj presents to [facility] for an evaluation of right hip. Patient has discomfort in the hip and has difficulty with sleeping. She has been dealing with hip pain for several months. She takes Eliquis and can't takes NSAIDs. She points to the lateral side of her hip as her main source of pain. Patient states her cardiologist allowed her to take Aleeve for a few days which had given her some relief.     No Known Allergies     Social History     Socioeconomic History   • Marital status:    Tobacco Use   • Smoking status: Never Smoker   • Smokeless tobacco: Never Used   • Tobacco comment: 1/1/1900   Vaping Use   • Vaping Use: Never used   Substance and Sexual Activity   • Alcohol use: Yes     Comment: rarely drinks less than 1 drink per day, has been drinkig for 31 or more years    • Drug use: Never        Review of Systems     Objective   Vital Signs:   Pulse 84   Ht 160 cm (63\")   Wt 68.8 kg (151 lb 9.6 oz)   SpO2 98%   BMI 26.85 kg/m²       Physical Exam  Constitutional:       Appearance: Normal appearance. The patient is well-developed and normal weight.   HENT:      Head: Normocephalic.      Right Ear: Hearing and external ear normal.      Left Ear: Hearing and external ear normal.      Nose: Nose normal.   Eyes:      Conjunctiva/sclera: Conjunctivae normal.   Cardiovascular:      Rate and Rhythm: Normal rate.   Pulmonary:      Effort: Pulmonary effort is normal.      Breath sounds: No wheezing or rales.   Abdominal:      Palpations: Abdomen is soft.      Tenderness: There is no abdominal tenderness.   Musculoskeletal:      Cervical back: Normal range of motion.   Skin:     Findings: No rash.   Neurological:      Mental Status: The patient is alert and oriented to person, place, and time.   Psychiatric:         Mood and Affect: Mood and affect normal.         Judgment: Judgment normal.       Ortho Exam  "     RIGHT HIP: Flexion to 100 degrees. Full extension. Er to 45 degrees. IR to 35 degrees. Good tone of hip flexors, hip extensors, hip adductor, hip abductors. No pain with straight leg raise. Negative impingement test. Tender to greater trochanter.     Right hip   Date/Time: 1/14/2022 11:10 AM  Consent given by: patient  Site marked: site marked  Timeout: Immediately prior to procedure a time out was called to verify the correct patient, procedure, equipment, support staff and site/side marked as required   Supporting Documentation  Indications: pain   Procedure Details  Location: hip (Right hip ) -   Needle size: 22 G  Medications administered: 5 mL bupivacaine 0.25 %; 40 mg triamcinolone acetonide 40 MG/ML  Patient tolerance: patient tolerated the procedure well with no immediate complications              Imaging Results (Most Recent)     Procedure Component Value Units Date/Time    XR Hip With or Without Pelvis 2 - 3 View Right [[id number]] Resulted: 01/14/22 1033     Updated: 01/14/22 1038           Result Review :       X-Ray Report:  Right hip(s) X-Ray  Indication: Evaluation of right hip pain   AP and Lateral view(s)  Findings: No fractures, no significant degenerative changes. There is a enthesophyte to the greater trochanter and calcification to lateral trochanter.   Prior studies available for comparison: no        Assessment and Plan     DX: Right hip trochanteric bursitis     Discussed treatment plans and diagnosis with the patient. A right hip bursa injection given, she tolerated this well. A prescription for pt given.      Call or return if worsening symptoms.    Follow Up     6-8 weeks.       Patient was given instructions and counseling regarding her condition or for health maintenance advice. Please see specific information pulled into the AVS if appropriate.     Scribed for Linus Ngo MD by Nicolasa Levi.  01/14/22   10:58 EST    I have personally performed the services described in  this document as scribed by the above individual and it is both accurate and complete. Linus Ngo MD 01/16/22

## 2022-01-16 RX ORDER — BUPIVACAINE HYDROCHLORIDE 2.5 MG/ML
5 INJECTION, SOLUTION INFILTRATION; PERINEURAL
Status: COMPLETED | OUTPATIENT
Start: 2022-01-14 | End: 2022-01-14

## 2022-01-16 RX ORDER — TRIAMCINOLONE ACETONIDE 40 MG/ML
40 INJECTION, SUSPENSION INTRA-ARTICULAR; INTRAMUSCULAR
Status: COMPLETED | OUTPATIENT
Start: 2022-01-14 | End: 2022-01-14

## 2022-09-26 ENCOUNTER — TRANSCRIBE ORDERS (OUTPATIENT)
Dept: ADMINISTRATIVE | Facility: HOSPITAL | Age: 77
End: 2022-09-26

## 2022-09-26 DIAGNOSIS — Z12.31 SCREENING MAMMOGRAM FOR BREAST CANCER: Primary | ICD-10-CM

## 2022-09-29 ENCOUNTER — OFFICE VISIT (OUTPATIENT)
Dept: ORTHOPEDIC SURGERY | Facility: CLINIC | Age: 77
End: 2022-09-29

## 2022-09-29 VITALS — HEART RATE: 54 BPM | HEIGHT: 63 IN | WEIGHT: 151 LBS | OXYGEN SATURATION: 96 % | BODY MASS INDEX: 26.75 KG/M2

## 2022-09-29 DIAGNOSIS — M18.11 PRIMARY OSTEOARTHRITIS OF FIRST CARPOMETACARPAL JOINT OF RIGHT HAND: Primary | ICD-10-CM

## 2022-09-29 PROCEDURE — 20600 DRAIN/INJ JOINT/BURSA W/O US: CPT | Performed by: ORTHOPAEDIC SURGERY

## 2022-09-29 RX ORDER — TRIAMCINOLONE ACETONIDE 40 MG/ML
40 INJECTION, SUSPENSION INTRA-ARTICULAR; INTRAMUSCULAR
Status: COMPLETED | OUTPATIENT
Start: 2022-09-29 | End: 2022-09-29

## 2022-09-29 RX ORDER — LIDOCAINE HYDROCHLORIDE 10 MG/ML
1 INJECTION, SOLUTION INFILTRATION; PERINEURAL
Status: COMPLETED | OUTPATIENT
Start: 2022-09-29 | End: 2022-09-29

## 2022-09-29 RX ADMIN — LIDOCAINE HYDROCHLORIDE 1 ML: 10 INJECTION, SOLUTION INFILTRATION; PERINEURAL at 10:50

## 2022-09-29 RX ADMIN — TRIAMCINOLONE ACETONIDE 40 MG: 40 INJECTION, SUSPENSION INTRA-ARTICULAR; INTRAMUSCULAR at 10:50

## 2022-09-29 NOTE — PROGRESS NOTES
"Chief Complaint  Follow-up and Pain of the Right Wrist     Subjective      Noni Bhardwaj presents to [facility] for follow up evaluation of the right wrist. The patient has been treating her right thumb osteoarthritis conservatively. She reports the injections give her relief. She is requesting repeat injection as needed.     No Known Allergies     Social History     Socioeconomic History   • Marital status:    Tobacco Use   • Smoking status: Never Smoker   • Smokeless tobacco: Never Used   • Tobacco comment: 1/1/1900   Vaping Use   • Vaping Use: Never used   Substance and Sexual Activity   • Alcohol use: Yes     Comment: rarely drinks less than 1 drink per day, has been drinkig for 31 or more years    • Drug use: Never   • Sexual activity: Defer        Review of Systems     Objective   Vital Signs:   Pulse 54   Ht 160 cm (63\")   Wt 68.5 kg (151 lb)   SpO2 96%   BMI 26.75 kg/m²       Physical Exam  Constitutional:       Appearance: Normal appearance. The patient is well-developed and normal weight.   HENT:      Head: Normocephalic.      Right Ear: Hearing and external ear normal.      Left Ear: Hearing and external ear normal.      Nose: Nose normal.   Eyes:      Conjunctiva/sclera: Conjunctivae normal.   Cardiovascular:      Rate and Rhythm: Normal rate.   Pulmonary:      Effort: Pulmonary effort is normal.      Breath sounds: No wheezing or rales.   Abdominal:      Palpations: Abdomen is soft.      Tenderness: There is no abdominal tenderness.   Musculoskeletal:      Cervical back: Normal range of motion.   Skin:     Findings: No rash.   Neurological:      Mental Status: The patient is alert and oriented to person, place, and time.   Psychiatric:         Mood and Affect: Mood and affect normal.         Judgment: Judgment normal.       Ortho Exam      Right thumb- positive grind testing. Tender to the Thumb CMC joint. Neurovascularly intact. Sensation to light touch median, " radial, ulnar nerve. Positive AIN, PIN, ulnar nerve. Positive pulses.     Small Joint Arthrocentesis: R thumb CMC  Consent given by: patient  Site marked: site marked  Timeout: Immediately prior to procedure a time out was called to verify the correct patient, procedure, equipment, support staff and site/side marked as required   Supporting Documentation  Indications: pain   Procedure Details  Location: thumb - R thumb CMC  Preparation: Patient was prepped and draped in the usual sterile fashion  Needle size: 25 G  Medications administered: 1 mL lidocaine 1 %; 40 mg triamcinolone acetonide 40 MG/ML  Patient tolerance: patient tolerated the procedure well with no immediate complications            Imaging Results (Most Recent)     None           Result Review :       No results found.           Assessment and Plan     Diagnoses and all orders for this visit:    1. Primary osteoarthritis of first carpometacarpal joint of right hand (Primary)        Discussed the treatment plan with the patient.  Discussed the risks and benefits of a right thumb CMC injection. The patient expressed understanding and wished to proceed. She tolerated the injection well.     Call or return if worsening symptoms.    Follow Up     PRN      Patient was given instructions and counseling regarding her condition or for health maintenance advice. Please see specific information pulled into the AVS if appropriate.     Scribed for Linus Ngo MD by Kristen Machado.  09/29/22   10:29 EDT    I have personally performed the services described in this document as scribed by the above individual and it is both accurate and complete. Linus Ngo MD 09/29/22

## 2022-10-06 ENCOUNTER — OFFICE VISIT (OUTPATIENT)
Dept: ORTHOPEDIC SURGERY | Facility: CLINIC | Age: 77
End: 2022-10-06

## 2022-10-06 VITALS — OXYGEN SATURATION: 98 % | BODY MASS INDEX: 26.75 KG/M2 | HEART RATE: 76 BPM | WEIGHT: 151 LBS | HEIGHT: 63 IN

## 2022-10-06 DIAGNOSIS — M65.4 TENDINITIS, DE QUERVAIN'S: Primary | ICD-10-CM

## 2022-10-06 DIAGNOSIS — M18.0 OSTEOARTHRITIS OF CARPOMETACARPAL (CMC) JOINTS OF BOTH THUMBS, UNSPECIFIED OSTEOARTHRITIS TYPE: ICD-10-CM

## 2022-10-06 DIAGNOSIS — M18.11 OSTEOARTHRITIS OF RIGHT THUMB: ICD-10-CM

## 2022-10-06 PROCEDURE — 20551 NJX 1 TENDON ORIGIN/INSJ: CPT | Performed by: ORTHOPAEDIC SURGERY

## 2022-10-06 RX ADMIN — TRIAMCINOLONE ACETONIDE 40 MG: 40 INJECTION, SUSPENSION INTRA-ARTICULAR; INTRAMUSCULAR at 16:16

## 2022-10-06 RX ADMIN — LIDOCAINE HYDROCHLORIDE 1 ML: 10 INJECTION, SOLUTION INFILTRATION; PERINEURAL at 16:16

## 2022-10-06 NOTE — PROGRESS NOTES
"Chief Complaint  Pain and Follow-up of the Right Wrist     Subjective      Noni Bhardwaj presents to [facility] for follow up evaluation of the right hand and wrist. The patient has been treating her Right thumb CMC osteoarthritis and de Quervain's conservatively.     No Known Allergies     Social History     Socioeconomic History   • Marital status:    Tobacco Use   • Smoking status: Never Smoker   • Smokeless tobacco: Never Used   • Tobacco comment: 1/1/1900   Vaping Use   • Vaping Use: Never used   Substance and Sexual Activity   • Alcohol use: Yes     Comment: rarely drinks less than 1 drink per day, has been drinkig for 31 or more years    • Drug use: Never   • Sexual activity: Defer        Review of Systems     Objective   Vital Signs:   Pulse 76   Ht 160 cm (63\")   Wt 68.5 kg (151 lb)   SpO2 98%   BMI 26.75 kg/m²       Physical Exam  Constitutional:       Appearance: Normal appearance. The patient is well-developed and normal weight.   HENT:      Head: Normocephalic.      Right Ear: Hearing and external ear normal.      Left Ear: Hearing and external ear normal.      Nose: Nose normal.   Eyes:      Conjunctiva/sclera: Conjunctivae normal.   Cardiovascular:      Rate and Rhythm: Normal rate.   Pulmonary:      Effort: Pulmonary effort is normal.      Breath sounds: No wheezing or rales.   Abdominal:      Palpations: Abdomen is soft.      Tenderness: There is no abdominal tenderness.   Musculoskeletal:      Cervical back: Normal range of motion.   Skin:     Findings: No rash.   Neurological:      Mental Status: The patient is alert and oriented to person, place, and time.   Psychiatric:         Mood and Affect: Mood and affect normal.         Judgment: Judgment normal.       Ortho Exam      Right wrist- positive grind testing to the thumb. Tender to the Thumb CMC joint. Neurovascularly intact. Sensation to light touch median, radial, ulnar nerve. Positive AIN, PIN, ulnar " nerve. Positive pulses. positive Finkelstein testing    Medium Joint Arthrocentesis: right wrist  Date/Time: 10/6/2022 4:16 PM  Consent given by: patient  Site marked: site marked  Timeout: Immediately prior to procedure a time out was called to verify the correct patient, procedure, equipment, support staff and site/side marked as required   Procedure Details  Location: wrist - Wrist joint: right wrist   Needle size: 23 G  Medications administered: 1 mL lidocaine 1 %; 40 mg triamcinolone acetonide 40 MG/ML  Patient tolerance: patient tolerated the procedure well with no immediate complications          X-Ray Report:  Right wrist(s) X-Ray  Indication: Evaluation of right wrist pain  AP and Lateral view(s)  Findings: advanced degenerative changes to the thumb CMC joint.   Prior studies available for comparison: yes         Imaging Results (Most Recent)     Procedure Component Value Units Date/Time    XR Wrist 2 View Right [[id number]] Resulted: 10/06/22 1552     Updated: 10/06/22 1556           Result Review :       No results found.           Assessment and Plan     Diagnoses and all orders for this visit:    1. Tendinitis, de Quervain's (Primary)  -     XR Wrist 2 View Right    2. Osteoarthritis of carpometacarpal (CMC) joints of both thumbs, unspecified osteoarthritis type  -     XR Wrist 2 View Right    3. Osteoarthritis of right thumb  -     XR Wrist 2 View Right        Discussed the treatment plan with the patient.  Discussed the risks and benefits of a right de Quervain's injection. The patient expressed understanding and wished to proceed. She tolerated the injection well.     Call or return if worsening symptoms.    Follow Up     PRN      Patient was given instructions and counseling regarding her condition or for health maintenance advice. Please see specific information pulled into the AVS if appropriate.     Scribed for Linus Ngo MD by Kristen Machado.  10/06/22   15:46 EDT    I have  personally performed the services described in this document as scribed by the above individual and it is both accurate and complete. Linus Ngo MD 10/09/22

## 2022-10-09 RX ORDER — TRIAMCINOLONE ACETONIDE 40 MG/ML
40 INJECTION, SUSPENSION INTRA-ARTICULAR; INTRAMUSCULAR
Status: COMPLETED | OUTPATIENT
Start: 2022-10-06 | End: 2022-10-06

## 2022-10-09 RX ORDER — LIDOCAINE HYDROCHLORIDE 10 MG/ML
1 INJECTION, SOLUTION INFILTRATION; PERINEURAL
Status: COMPLETED | OUTPATIENT
Start: 2022-10-06 | End: 2022-10-06

## 2022-10-26 ENCOUNTER — TRANSCRIBE ORDERS (OUTPATIENT)
Dept: LAB | Facility: HOSPITAL | Age: 77
End: 2022-10-26

## 2022-10-26 DIAGNOSIS — I48.0 PAROXYSMAL ATRIAL FIBRILLATION: Primary | ICD-10-CM

## 2022-10-27 ENCOUNTER — HOSPITAL ENCOUNTER (OUTPATIENT)
Dept: CARDIOLOGY | Facility: HOSPITAL | Age: 77
Discharge: HOME | End: 2022-10-27

## 2022-10-27 ENCOUNTER — LAB (OUTPATIENT)
Dept: LAB | Facility: HOSPITAL | Age: 77
End: 2022-10-27

## 2022-10-27 DIAGNOSIS — I48.0 PAROXYSMAL ATRIAL FIBRILLATION: ICD-10-CM

## 2022-10-27 LAB
ANION GAP SERPL CALCULATED.3IONS-SCNC: 9 MMOL/L (ref 5–15)
BUN SERPL-MCNC: 22 MG/DL (ref 8–23)
BUN/CREAT SERPL: 20.6 (ref 7–25)
CALCIUM SPEC-SCNC: 9.9 MG/DL (ref 8.6–10.5)
CHLORIDE SERPL-SCNC: 105 MMOL/L (ref 98–107)
CO2 SERPL-SCNC: 27 MMOL/L (ref 22–29)
CREAT SERPL-MCNC: 1.07 MG/DL (ref 0.57–1)
EGFRCR SERPLBLD CKD-EPI 2021: 53.6 ML/MIN/1.73
GLUCOSE SERPL-MCNC: 69 MG/DL (ref 65–99)
MAGNESIUM SERPL-MCNC: 2.2 MG/DL (ref 1.6–2.4)
POTASSIUM SERPL-SCNC: 4.2 MMOL/L (ref 3.5–5.2)
SODIUM SERPL-SCNC: 141 MMOL/L (ref 136–145)

## 2022-10-27 PROCEDURE — 80048 BASIC METABOLIC PNL TOTAL CA: CPT

## 2022-10-27 PROCEDURE — 83735 ASSAY OF MAGNESIUM: CPT

## 2022-10-27 PROCEDURE — 93005 ELECTROCARDIOGRAM TRACING: CPT | Performed by: INTERNAL MEDICINE

## 2022-10-27 PROCEDURE — 36415 COLL VENOUS BLD VENIPUNCTURE: CPT

## 2022-11-07 LAB — QT INTERVAL: 436 MS

## 2022-11-10 NOTE — PROGRESS NOTES
Chief Complaint  incontinence of feces    Noni Bhardwaj is a 77 y.o. female who presents to [facility]  for a gastroenterology evaluation of incontinence of feces.    History of Present Illness  New patient presents to the office for incontinence of feces and diarrhea. She has struggled with fecal incontinence and intermittent diarrhea for over 20 years.  She was previously evaluated by pelvic floor physical therapist but discontinue exercises.  She can take Imodium as needed that helps alleviate symptoms.  Denies constipation, melena, hematochezia, and unintentional weight loss.  Patient had a colonoscopy about 8 years ago in [place] that was normal.  Denies family history of colon cancer and personal history of colon polyps.  Denies upper GI symptoms such as persistent heartburn, nausea, vomiting, epigastric pain, and dysphagia.      Past Medical History:   Diagnosis Date   • A-fib (HCC)    • Coronary artery disease 2019    AFib   • Disease of thyroid gland        Past Surgical History:   Procedure Laterality Date   • APPENDECTOMY     • BACK SURGERY     • COLONOSCOPY     • HYSTERECTOMY           Current Outpatient Medications:   •  acetaminophen (TYLENOL) 500 MG tablet, Take 500 mg by mouth., Disp: , Rfl:   •  dofetilide (TIKOSYN) 500 MCG capsule, Take 500 mcg by mouth., Disp: , Rfl:   •  Eliquis 5 MG tablet tablet, Take 5 mg by mouth 2 (Two) Times a Day., Disp: , Rfl:   •  levothyroxine (SYNTHROID, LEVOTHROID) 75 MCG tablet, TAKE 1 TABLET BY MOUTH DAILY 6 DAYS PER WEEK, Disp: , Rfl:   •  metoprolol succinate XL (TOPROL-XL) 25 MG 24 hr tablet, Take 25 mg by mouth 2 (Two) Times a Day., Disp: , Rfl:   •  colestipol (COLESTID) 1 g tablet, Take 1 tablet by mouth 2 (Two) Times a Day., Disp: 120 tablet, Rfl: 1     No Known Allergies    Family History   Problem Relation Age of Onset   • Colon cancer Neg Hx         Social History     Social History Narrative   • Not on file  "      Immunization:  Immunization History   Administered Date(s) Administered   • COVID-19 (PFIZER) BIVALENT BOOSTER 12+YRS 10/03/2022   • COVID-19 (PFIZER) PURPLE CAP 02/11/2021, 03/04/2021, 10/05/2021        Objective     Vital Signs:   /68 (BP Location: Left arm, Patient Position: Sitting, Cuff Size: Adult)   Pulse 58   Ht 160 cm (62.99\")   Wt 68.4 kg (150 lb 11.2 oz)   SpO2 99%   BMI 26.70 kg/m²       Physical Exam  Constitutional:       Appearance: Normal appearance.   HENT:      Head: Normocephalic.   Cardiovascular:      Rate and Rhythm: Normal rate and regular rhythm.      Heart sounds: Normal heart sounds.   Pulmonary:      Effort: Pulmonary effort is normal.      Breath sounds: Normal breath sounds.   Abdominal:      General: Bowel sounds are normal.      Palpations: Abdomen is soft.   Skin:     General: Skin is warm and dry.   Neurological:      Mental Status: She is alert and oriented to person, place, and time. Mental status is at baseline.   Psychiatric:         Mood and Affect: Mood normal.         Behavior: Behavior normal.         Thought Content: Thought content normal.         Judgment: Judgment normal.         Result Review :       CMP    CMP 10/27/22   Glucose 69   BUN 22   Creatinine 1.07 (A)   Sodium 141   Potassium 4.2   Chloride 105   Calcium 9.9   (A) Abnormal value                    Assessment and Plan    Diagnoses and all orders for this visit:    1. Incontinence of feces, unspecified fecal incontinence type (Primary)    2. Diarrhea, unspecified type    Other orders  -     colestipol (COLESTID) 1 g tablet; Take 1 tablet by mouth 2 (Two) Times a Day.  Dispense: 120 tablet; Refill: 1    77-year-old new patient presents to the office with long history of fecal incontinence and intermittent diarrhea.  Patient has struggled with these symptoms for over 20 years.  Discussed referral to pelvic floor physical therapist again, patient did not wish to proceed at this time.  Trial of " Colestid sent to pharmacy, discussed how to titrate medication.  Patient will continue to utilize Imodium as needed.  Patient wishes to follow-up on an as-needed basis.  Patient is agreeable plan will call the office any questions or concerns.    Follow Up   No follow-ups on file.  Patient was given instructions and counseling regarding her condition or for health maintenance advice. Please see specific information pulled into the AVS if appropriate.

## 2022-11-11 ENCOUNTER — OFFICE VISIT (OUTPATIENT)
Dept: GASTROENTEROLOGY | Facility: CLINIC | Age: 77
End: 2022-11-11

## 2022-11-11 VITALS
HEIGHT: 63 IN | DIASTOLIC BLOOD PRESSURE: 68 MMHG | HEART RATE: 58 BPM | SYSTOLIC BLOOD PRESSURE: 154 MMHG | WEIGHT: 150.7 LBS | OXYGEN SATURATION: 99 % | BODY MASS INDEX: 26.7 KG/M2

## 2022-11-11 DIAGNOSIS — R19.7 DIARRHEA, UNSPECIFIED TYPE: ICD-10-CM

## 2022-11-11 DIAGNOSIS — R15.9 INCONTINENCE OF FECES, UNSPECIFIED FECAL INCONTINENCE TYPE: Primary | ICD-10-CM

## 2022-11-11 PROCEDURE — 99204 OFFICE O/P NEW MOD 45 MIN: CPT

## 2022-11-11 RX ORDER — MONTELUKAST SODIUM 4 MG/1
1 TABLET, CHEWABLE ORAL 2 TIMES DAILY
Qty: 120 TABLET | Refills: 1 | Status: SHIPPED | OUTPATIENT
Start: 2022-11-11 | End: 2023-01-19

## 2022-12-01 ENCOUNTER — HOSPITAL ENCOUNTER (OUTPATIENT)
Dept: MAMMOGRAPHY | Facility: HOSPITAL | Age: 77
Discharge: HOME | End: 2022-12-01
Admitting: FAMILY MEDICINE

## 2022-12-01 DIAGNOSIS — Z12.31 SCREENING MAMMOGRAM FOR BREAST CANCER: ICD-10-CM

## 2022-12-01 PROCEDURE — 77067 SCR MAMMO BI INCL CAD: CPT

## 2022-12-01 PROCEDURE — 77063 BREAST TOMOSYNTHESIS BI: CPT

## 2023-01-19 ENCOUNTER — TELEPHONE (OUTPATIENT)
Dept: FAMILY MEDICINE CLINIC | Facility: CLINIC | Age: 78
End: 2023-01-19

## 2023-01-19 ENCOUNTER — OFFICE VISIT (OUTPATIENT)
Dept: FAMILY MEDICINE CLINIC | Facility: CLINIC | Age: 78
End: 2023-01-19
Payer: MEDICARE

## 2023-01-19 VITALS
SYSTOLIC BLOOD PRESSURE: 158 MMHG | OXYGEN SATURATION: 98 % | TEMPERATURE: 98.2 F | BODY MASS INDEX: 27.29 KG/M2 | HEART RATE: 72 BPM | DIASTOLIC BLOOD PRESSURE: 78 MMHG | WEIGHT: 154 LBS

## 2023-01-19 DIAGNOSIS — E03.9 HYPOTHYROIDISM, UNSPECIFIED TYPE: ICD-10-CM

## 2023-01-19 DIAGNOSIS — Z76.89 ENCOUNTER TO ESTABLISH CARE: ICD-10-CM

## 2023-01-19 DIAGNOSIS — L98.9 SKIN LESION OF BACK: ICD-10-CM

## 2023-01-19 DIAGNOSIS — Z79.899 DRUG THERAPY: ICD-10-CM

## 2023-01-19 DIAGNOSIS — Z78.0 POST-MENOPAUSAL: ICD-10-CM

## 2023-01-19 DIAGNOSIS — E78.2 MIXED HYPERLIPIDEMIA: ICD-10-CM

## 2023-01-19 DIAGNOSIS — Z00.00 MEDICARE ANNUAL WELLNESS VISIT, SUBSEQUENT: Primary | ICD-10-CM

## 2023-01-19 PROBLEM — I48.0 PAROXYSMAL A-FIB: Status: ACTIVE | Noted: 2023-01-19

## 2023-01-19 LAB
ALBUMIN SERPL-MCNC: 4.1 G/DL (ref 3.5–5.2)
ALBUMIN/GLOB SERPL: 1.6 G/DL
ALP SERPL-CCNC: 65 U/L (ref 39–117)
ALT SERPL W P-5'-P-CCNC: 12 U/L (ref 1–33)
ANION GAP SERPL CALCULATED.3IONS-SCNC: 9 MMOL/L (ref 5–15)
AST SERPL-CCNC: 22 U/L (ref 1–32)
BASOPHILS # BLD AUTO: 0.04 10*3/MM3 (ref 0–0.2)
BASOPHILS NFR BLD AUTO: 0.6 % (ref 0–1.5)
BILIRUB SERPL-MCNC: 0.5 MG/DL (ref 0–1.2)
BUN SERPL-MCNC: 15 MG/DL (ref 8–23)
BUN/CREAT SERPL: 15.8 (ref 7–25)
CALCIUM SPEC-SCNC: 9.7 MG/DL (ref 8.6–10.5)
CHLORIDE SERPL-SCNC: 105 MMOL/L (ref 98–107)
CHOLEST SERPL-MCNC: 220 MG/DL (ref 0–200)
CO2 SERPL-SCNC: 27 MMOL/L (ref 22–29)
CREAT SERPL-MCNC: 0.95 MG/DL (ref 0.57–1)
DEPRECATED RDW RBC AUTO: 41.2 FL (ref 37–54)
EGFRCR SERPLBLD CKD-EPI 2021: 61.8 ML/MIN/1.73
EOSINOPHIL # BLD AUTO: 0.16 10*3/MM3 (ref 0–0.4)
EOSINOPHIL NFR BLD AUTO: 2.3 % (ref 0.3–6.2)
ERYTHROCYTE [DISTWIDTH] IN BLOOD BY AUTOMATED COUNT: 12.5 % (ref 12.3–15.4)
GLOBULIN UR ELPH-MCNC: 2.5 GM/DL
GLUCOSE SERPL-MCNC: 77 MG/DL (ref 65–99)
HCT VFR BLD AUTO: 38.6 % (ref 34–46.6)
HDLC SERPL-MCNC: 66 MG/DL (ref 40–60)
HGB BLD-MCNC: 12.5 G/DL (ref 12–15.9)
IMM GRANULOCYTES # BLD AUTO: 0.02 10*3/MM3 (ref 0–0.05)
IMM GRANULOCYTES NFR BLD AUTO: 0.3 % (ref 0–0.5)
LDLC SERPL CALC-MCNC: 140 MG/DL (ref 0–100)
LDLC/HDLC SERPL: 2.08 {RATIO}
LYMPHOCYTES # BLD AUTO: 0.99 10*3/MM3 (ref 0.7–3.1)
LYMPHOCYTES NFR BLD AUTO: 14.5 % (ref 19.6–45.3)
MCH RBC QN AUTO: 29.8 PG (ref 26.6–33)
MCHC RBC AUTO-ENTMCNC: 32.4 G/DL (ref 31.5–35.7)
MCV RBC AUTO: 92.1 FL (ref 79–97)
MONOCYTES # BLD AUTO: 0.45 10*3/MM3 (ref 0.1–0.9)
MONOCYTES NFR BLD AUTO: 6.6 % (ref 5–12)
NEUTROPHILS NFR BLD AUTO: 5.18 10*3/MM3 (ref 1.7–7)
NEUTROPHILS NFR BLD AUTO: 75.7 % (ref 42.7–76)
NRBC BLD AUTO-RTO: 0 /100 WBC (ref 0–0.2)
PLATELET # BLD AUTO: 248 10*3/MM3 (ref 140–450)
PMV BLD AUTO: 10.5 FL (ref 6–12)
POTASSIUM SERPL-SCNC: 4.5 MMOL/L (ref 3.5–5.2)
PROT SERPL-MCNC: 6.6 G/DL (ref 6–8.5)
RBC # BLD AUTO: 4.19 10*6/MM3 (ref 3.77–5.28)
SODIUM SERPL-SCNC: 141 MMOL/L (ref 136–145)
T4 FREE SERPL-MCNC: 1.75 NG/DL (ref 0.93–1.7)
TRIGL SERPL-MCNC: 82 MG/DL (ref 0–150)
TSH SERPL DL<=0.05 MIU/L-ACNC: 0.99 UIU/ML (ref 0.27–4.2)
VLDLC SERPL-MCNC: 14 MG/DL (ref 5–40)
WBC NRBC COR # BLD: 6.84 10*3/MM3 (ref 3.4–10.8)

## 2023-01-19 PROCEDURE — 84439 ASSAY OF FREE THYROXINE: CPT | Performed by: FAMILY MEDICINE

## 2023-01-19 PROCEDURE — 99214 OFFICE O/P EST MOD 30 MIN: CPT | Performed by: FAMILY MEDICINE

## 2023-01-19 PROCEDURE — 1159F MED LIST DOCD IN RCRD: CPT | Performed by: FAMILY MEDICINE

## 2023-01-19 PROCEDURE — 84443 ASSAY THYROID STIM HORMONE: CPT | Performed by: FAMILY MEDICINE

## 2023-01-19 PROCEDURE — 1170F FXNL STATUS ASSESSED: CPT | Performed by: FAMILY MEDICINE

## 2023-01-19 PROCEDURE — 1126F AMNT PAIN NOTED NONE PRSNT: CPT | Performed by: FAMILY MEDICINE

## 2023-01-19 PROCEDURE — 80053 COMPREHEN METABOLIC PANEL: CPT | Performed by: FAMILY MEDICINE

## 2023-01-19 PROCEDURE — 85025 COMPLETE CBC W/AUTO DIFF WBC: CPT | Performed by: FAMILY MEDICINE

## 2023-01-19 PROCEDURE — 80061 LIPID PANEL: CPT | Performed by: FAMILY MEDICINE

## 2023-01-19 PROCEDURE — G0439 PPPS, SUBSEQ VISIT: HCPCS | Performed by: FAMILY MEDICINE

## 2023-01-19 PROCEDURE — 36415 COLL VENOUS BLD VENIPUNCTURE: CPT | Performed by: FAMILY MEDICINE

## 2023-01-19 NOTE — PROGRESS NOTES
The ABCs of the Annual Wellness Visit  Subsequent Medicare Wellness Visit    Subjective    Noni Bhardwaj is a 77 y.o. female who presents for a Subsequent Medicare Wellness Visit.    The following portions of the patient's history were reviewed and   updated as appropriate:   She  has a past medical history of A-fib (HCC), Coronary artery disease (2019), and Disease of thyroid gland.  She does not have any pertinent problems on file.  She  has a past surgical history that includes Appendectomy; Back surgery; Colonoscopy; and Hysterectomy.  Her family history is not on file.  She  reports that she has never smoked. She has never used smokeless tobacco. She reports that she does not currently use alcohol. She reports that she does not use drugs.  Current Outpatient Medications   Medication Sig Dispense Refill   • acetaminophen (TYLENOL) 500 MG tablet Take 500 mg by mouth.     • dofetilide (TIKOSYN) 500 MCG capsule Take 500 mcg by mouth.     • Eliquis 5 MG tablet tablet Take 5 mg by mouth 2 (Two) Times a Day.     • levothyroxine (SYNTHROID, LEVOTHROID) 75 MCG tablet TAKE 1 TABLET BY MOUTH DAILY 6 DAYS PER WEEK     • metoprolol succinate XL (TOPROL-XL) 25 MG 24 hr tablet Take 25 mg by mouth 2 (Two) Times a Day.       No current facility-administered medications for this visit.     Current Outpatient Medications on File Prior to Visit   Medication Sig   • acetaminophen (TYLENOL) 500 MG tablet Take 500 mg by mouth.   • dofetilide (TIKOSYN) 500 MCG capsule Take 500 mcg by mouth.   • Eliquis 5 MG tablet tablet Take 5 mg by mouth 2 (Two) Times a Day.   • levothyroxine (SYNTHROID, LEVOTHROID) 75 MCG tablet TAKE 1 TABLET BY MOUTH DAILY 6 DAYS PER WEEK   • metoprolol succinate XL (TOPROL-XL) 25 MG 24 hr tablet Take 25 mg by mouth 2 (Two) Times a Day.   • [DISCONTINUED] colestipol (COLESTID) 1 g tablet Take 1 tablet by mouth 2 (Two) Times a Day.     No current facility-administered medications on file prior to visit.     She has  No Known Allergies..    Compared to one year ago, the patient feels her physical   health is the same.    Compared to one year ago, the patient feels her mental   health is the same.    Recent Hospitalizations:  She was admitted within the past 365 days at [facility].       Current Medical Providers:  Patient Care Team:  Chi Jasso MD as PCP - General (Family Medicine)  Roque Jeff MD as Consulting Physician (Gastroenterology)  Lorna Tong APRN as Nurse Practitioner (Gastroenterology)    Outpatient Medications Prior to Visit   Medication Sig Dispense Refill   • acetaminophen (TYLENOL) 500 MG tablet Take 500 mg by mouth.     • dofetilide (TIKOSYN) 500 MCG capsule Take 500 mcg by mouth.     • Eliquis 5 MG tablet tablet Take 5 mg by mouth 2 (Two) Times a Day.     • levothyroxine (SYNTHROID, LEVOTHROID) 75 MCG tablet TAKE 1 TABLET BY MOUTH DAILY 6 DAYS PER WEEK     • metoprolol succinate XL (TOPROL-XL) 25 MG 24 hr tablet Take 25 mg by mouth 2 (Two) Times a Day.     • colestipol (COLESTID) 1 g tablet Take 1 tablet by mouth 2 (Two) Times a Day. 120 tablet 1     No facility-administered medications prior to visit.       No opioid medication identified on active medication list. I have reviewed chart for other potential  high risk medication/s and harmful drug interactions in the elderly.          Aspirin is not on active medication list.  Aspirin use is not indicated based on review of current medical condition/s. Risk of harm outweighs potential benefits.  .    Patient Active Problem List   Diagnosis   • Aftercare following surgery of right shoulder arthroscopic subacromial decompression, biceps tenotomy, mini open rotator cuff repair, 2/8/2021   • Osteoarthritis of carpometacarpal (CMC) joints of both thumbs   • Bilateral wrist pain   • Tendinitis, de Quervain's   • Osteoarthritis of right thumb   • Paroxysmal A-fib (HCC)     Advance Care Planning  Advance Directive is not on file.   "ACP discussion was held with the patient during this visit. Patient does not have an advance directive, information provided.     Objective    Vitals:    01/19/23 0926   BP: 158/78   Pulse: 72   Temp: 98.2 °F (36.8 °C)   SpO2: 98%   Weight: 69.9 kg (154 lb)   PainSc: 0-No pain     Estimated body mass index is 27.29 kg/m² as calculated from the following:    Height as of 11/11/22: 160 cm (62.99\").    Weight as of this encounter: 69.9 kg (154 lb).    BMI is >= 25 and <30. (Overweight) The following options were offered after discussion;: exercise counseling/recommendations and nutrition counseling/recommendations      Does the patient have evidence of cognitive impairment? No          HEALTH RISK ASSESSMENT    Smoking Status:  Social History     Tobacco Use   Smoking Status Never   Smokeless Tobacco Never   Tobacco Comments    1/1/1900     Alcohol Consumption:  Social History     Substance and Sexual Activity   Alcohol Use Not Currently    Comment: Once a month     Fall Risk Screen:    HANK Fall Risk Assessment was completed, and patient is at LOW risk for falls.Assessment completed on:1/19/2023    Depression Screening:  PHQ-2/PHQ-9 Depression Screening 1/19/2023   Little Interest or Pleasure in Doing Things 0-->not at all   Feeling Down, Depressed or Hopeless 0-->not at all   PHQ-9: Brief Depression Severity Measure Score 0       Health Habits and Functional and Cognitive Screening:  Functional & Cognitive Status 1/19/2023   Do you have difficulty preparing food and eating? No   Do you have difficulty bathing yourself, getting dressed or grooming yourself? No   Do you have difficulty using the toilet? No   Do you have difficulty moving around from place to place? No   Do you have trouble with steps or getting out of a bed or a chair? No   Current Diet Well Balanced Diet   Dental Exam Up to date   Eye Exam Up to date   Exercise (times per week) 5 times per week   Do you need help using the phone?  No   Are you deaf " or do you have serious difficulty hearing?  No   Do you need help with transportation? No   Do you need help shopping? No   Do you need help preparing meals?  No   Do you need help with housework?  No   Do you need help with laundry? No   Do you need help taking your medications? No   Do you need help managing money? No   Do you ever drive or ride in a car without wearing a seat belt? No   Have you felt unusual stress, anger or loneliness in the last month? Yes   Who do you live with? Spouse   If you need help, do you have trouble finding someone available to you? No   Do you have difficulty concentrating, remembering or making decisions? No       Age-appropriate Screening Schedule:  Refer to the list below for future screening recommendations based on patient's age, sex and/or medical conditions. Orders for these recommended tests are listed in the plan section. The patient has been provided with a written plan.    Health Maintenance   Topic Date Due   • DXA SCAN  Never done   • TDAP/TD VACCINES (1 - Tdap) Never done   • ZOSTER VACCINE (2 of 2) 12/15/2020   • LIPID PANEL  01/19/2023   • INFLUENZA VACCINE  Completed                CMS Preventative Services Quick Reference  Risk Factors Identified During Encounter  Immunizations Discussed/Encouraged: Tdap  Inactivity/Sedentary: Patient was advised to exercise at least 150 minutes a week per CDC recommendations.  The above risks/problems have been discussed with the patient.  Pertinent information has been shared with the patient in the After Visit Summary.  An After Visit Summary and PPPS were made available to the patient.    Follow Up:   Next Medicare Wellness visit to be scheduled in 1 year.       Additional E&M Note during same encounter follows:  Patient has multiple medical problems which are significant and separately identifiable that require additional work above and beyond the Medicare Wellness Visit.      Chief Complaint  Establish Care (New patient  establish care ) and Medicare Wellness-subsequent (Medicare wellness )    Subjective        Pt needs to get established  Pt's  has altzheimers dementia and pt is caregiver- she has anxiety and stress but she does not feel that she needs anything at this time- sleep is good, per pt- pt has no SI or HI and feels safe    Pt has skin lesion on right posterior shoulder x 1 month- raised, ulcerated lesion    Hypothyroidism  This is a chronic problem. The current episode started more than 1 year ago. The problem occurs intermittently. The problem has been gradually improving. Pertinent negatives include no abdominal pain, anorexia, arthralgias, change in bowel habit, chest pain, chills, congestion, coughing, diaphoresis, fatigue, fever, headaches, joint swelling, myalgias, nausea, neck pain, numbness, rash, sore throat, swollen glands, urinary symptoms, vertigo, visual change, vomiting or weakness. Nothing aggravates the symptoms. Treatments tried: synthroid. The treatment provided significant relief.   Hyperlipidemia  This is a chronic problem. The current episode started more than 1 year ago. The problem is uncontrolled. Recent lipid tests were reviewed and are variable. Exacerbating diseases include hypothyroidism. There are no known factors aggravating her hyperlipidemia. Pertinent negatives include no chest pain, focal sensory loss, focal weakness, leg pain, myalgias or shortness of breath. Current antihyperlipidemic treatment includes diet change. The current treatment provides mild improvement of lipids. There are no compliance problems.  Risk factors for coronary artery disease include post-menopausal, dyslipidemia and family history.     Noni Bhardwaj is also being seen today for establish care, hypothyroidism, hyperlipidemia, skin lesion of posterior right shoulder.    Review of Systems   Constitutional: Negative for chills, diaphoresis, fatigue and fever.   HENT: Negative for congestion, sore throat and  swollen glands.    Respiratory: Negative for cough, chest tightness, shortness of breath and wheezing.    Cardiovascular: Negative for chest pain, palpitations and leg swelling.   Gastrointestinal: Negative for abdominal pain, anorexia, change in bowel habit, diarrhea, nausea and vomiting.   Musculoskeletal: Negative for arthralgias, joint swelling, myalgias and neck pain.   Skin: Negative for rash.   Neurological: Negative for vertigo, focal weakness, weakness, light-headedness and numbness.   Psychiatric/Behavioral: Negative for decreased concentration, dysphoric mood, sleep disturbance and suicidal ideas. The patient is not nervous/anxious.        Objective   Vital Signs:  /78   Pulse 72   Temp 98.2 °F (36.8 °C)   Wt 69.9 kg (154 lb)   SpO2 98%   BMI 27.29 kg/m²     Physical Exam  Vitals reviewed. Exam conducted with a chaperone present.   Constitutional:       Appearance: Normal appearance. She is well-developed.   HENT:      Head: Normocephalic and atraumatic.      Right Ear: External ear normal.      Left Ear: External ear normal.      Mouth/Throat:      Pharynx: No oropharyngeal exudate.   Eyes:      Conjunctiva/sclera: Conjunctivae normal.      Pupils: Pupils are equal, round, and reactive to light.   Cardiovascular:      Rate and Rhythm: Normal rate and regular rhythm.      Pulses: Normal pulses.      Heart sounds: Normal heart sounds. No murmur heard.    No friction rub. No gallop.   Pulmonary:      Effort: Pulmonary effort is normal.      Breath sounds: Normal breath sounds. No wheezing or rhonchi.   Abdominal:      General: Abdomen is flat. Bowel sounds are normal. There is no distension.      Palpations: Abdomen is soft. There is no mass.      Tenderness: There is no abdominal tenderness. There is no guarding or rebound.      Hernia: No hernia is present.   Musculoskeletal:         General: Normal range of motion.   Skin:     General: Skin is warm and dry.      Capillary Refill: Capillary  refill takes less than 2 seconds.      Comments: Right posterior shoulder lesion 1cm ulcerated raised skin colored lesion.   Neurological:      General: No focal deficit present.      Mental Status: She is alert and oriented to person, place, and time.      Cranial Nerves: No cranial nerve deficit.   Psychiatric:         Mood and Affect: Mood and affect normal.         Behavior: Behavior normal.         Thought Content: Thought content normal.         Judgment: Judgment normal.          The following data was reviewed by: Chi Jasso MD on 01/19/2023:  CMP    CMP 10/27/22   Glucose 69   BUN 22   Creatinine 1.07 (A)   eGFR 53.6 (A)   Sodium 141   Potassium 4.2   Chloride 105   Calcium 9.9   BUN/Creatinine Ratio 20.6   Anion Gap 9.0   (A) Abnormal value       Comments are available for some flowsheets but are not being displayed.                                  Assessment and Plan   Diagnoses and all orders for this visit:    1. Medicare annual wellness visit, subsequent (Primary)    2. Hypothyroidism, unspecified type  -     TSH+Free T4    3. Encounter to establish care  -     CBC Auto Differential  -     Comprehensive Metabolic Panel    4. Mixed hyperlipidemia  -     Comprehensive Metabolic Panel  -     Lipid Panel    5. Drug therapy  -     CBC Auto Differential  -     Comprehensive Metabolic Panel  -     Lipid Panel  -     TSH+Free T4    6. Post-menopausal  -     DEXA Bone Density Axial    7. Skin lesion of back    pt will schedule follow-up in 1 week to remove lesion         Follow Up   Return in about 1 week (around 1/26/2023) for remove skin lesion right shoulder- 45 minutes.  Patient was given instructions and counseling regarding her condition or for health maintenance advice. Please see specific information pulled into the AVS if appropriate.

## 2023-01-19 NOTE — TELEPHONE ENCOUNTER
Caller: Noni Bhardwaj    Relationship to patient: Self    Best call back number: [phone]    Chief complaint: LESION ON BACK NEEDING REMOVED    Type of visit: IN OFFICE PROCEDURE    Requested date: ASAP             Patient out of room, went outside to smoke     Amada Jack, AMOLN  40/17/11 [id number]

## 2023-01-19 NOTE — PROGRESS NOTES
Venipuncture Blood Specimen Collection  Venipuncture performed in left arm by Luciana Garcia with good hemostasis. Patient tolerated the procedure well without complications.   01/19/23   Luciana Garcia

## 2023-01-23 ENCOUNTER — OFFICE VISIT (OUTPATIENT)
Dept: FAMILY MEDICINE CLINIC | Facility: CLINIC | Age: 78
End: 2023-01-23
Payer: MEDICARE

## 2023-01-23 VITALS
HEIGHT: 63 IN | HEART RATE: 74 BPM | DIASTOLIC BLOOD PRESSURE: 80 MMHG | SYSTOLIC BLOOD PRESSURE: 134 MMHG | BODY MASS INDEX: 27.64 KG/M2 | WEIGHT: 156 LBS | OXYGEN SATURATION: 98 % | TEMPERATURE: 97.1 F

## 2023-01-23 DIAGNOSIS — L98.9 SKIN LESION OF BACK: Primary | ICD-10-CM

## 2023-01-23 DIAGNOSIS — C44.529 SQUAMOUS CELL CARCINOMA OF BACK: ICD-10-CM

## 2023-01-23 PROCEDURE — 88305 TISSUE EXAM BY PATHOLOGIST: CPT | Performed by: FAMILY MEDICINE

## 2023-01-23 PROCEDURE — 11602 EXC TR-EXT MAL+MARG 1.1-2 CM: CPT | Performed by: FAMILY MEDICINE

## 2023-01-23 NOTE — PROGRESS NOTES
Chief Complaint  Skin Lesion (Lesion on her back)    Subjective          Noni Bhardwaj presents to [facility]  History of Present Illness  Pt has skin lesion of right upper back 1.5cm- getting larger, changing      Objective   No Known Allergies  Immunization History   Administered Date(s) Administered   • COVID-19 (PFIZER) BIVALENT BOOSTER 12+YRS 10/03/2022   • COVID-19 (PFIZER) PURPLE CAP 02/11/2021, 03/04/2021, 10/05/2021   • Fluad Quad 65+ 10/14/2021, 10/10/2022   • Pneumococcal Conjugate 20-Valent (PCV20) 09/20/2022   • Shingrix 10/20/2020     Past Medical History:   Diagnosis Date   • A-fib (HCC)    • Cancer (HCC) Basal cell? cheek    2022   • Cataract    • Coronary artery disease 2019    AFib   • Disease of thyroid gland    • Stroke (HCC) 2019    TIAs      Past Surgical History:   Procedure Laterality Date   • APPENDECTOMY     • BACK SURGERY     • CARDIAC CATHETERIZATION  2019    Loop inserted   • COLONOSCOPY     • EYE SURGERY  2017    Sadi cataracts   • HYSTERECTOMY     • SPINE SURGERY  2013    Lumbar laminectomy      Social History     Socioeconomic History   • Marital status:    Tobacco Use   • Smoking status: Never   • Smokeless tobacco: Never   • Tobacco comments:     1/1/1900   Vaping Use   • Vaping Use: Never used   Substance and Sexual Activity   • Alcohol use: Yes     Alcohol/week: 1.0 standard drink     Types: 1 Drinks containing 0.5 oz of alcohol per week     Comment: Once a month   • Drug use: Never   • Sexual activity: Not Currently     Birth control/protection: Birth control pill, Hysterectomy        Current Outpatient Medications:   •  acetaminophen (TYLENOL) 500 MG tablet, Take 500 mg by mouth., Disp: , Rfl:   •  Eliquis 5 MG tablet tablet, Take 5 mg by mouth 2 (Two) Times a Day., Disp: , Rfl:   •  levothyroxine (SYNTHROID, LEVOTHROID) 75 MCG tablet, TAKE 1 TABLET BY MOUTH DAILY 6 DAYS PER WEEK, Disp: , Rfl:   •  metoprolol succinate XL (TOPROL-XL) 25 MG  "24 hr tablet, Take 12.5 mg by mouth Daily., Disp: , Rfl:   •  dofetilide (TIKOSYN) 500 MCG capsule, Take 500 mcg by mouth 2 (Two) Times a Day., Disp: , Rfl:    Family History   Problem Relation Age of Onset   • Heart disease Mother    • Vision loss Mother         Maculardegeneration/legally blind   • COPD Father    • Heart disease Father    • Mental illness Sister         bipolar   • Colon cancer Neg Hx           Vital Signs:   Vitals:    01/23/23 1623   BP: 134/80   BP Location: Left arm   Pulse: 74   Temp: 97.1 °F (36.2 °C)   SpO2: 98%   Weight: 70.8 kg (156 lb)   Height: 160 cm (63\")       Review of Systems   Constitutional: Negative for fatigue and fever.   Eyes: Negative for visual disturbance.   Respiratory: Negative for cough, chest tightness, shortness of breath and wheezing.    Cardiovascular: Negative for chest pain, palpitations and leg swelling.   Gastrointestinal: Negative for abdominal pain, diarrhea, nausea and vomiting.   Skin: Negative for rash.   Neurological: Negative for light-headedness and headaches.      Physical Exam  Vitals reviewed.   Constitutional:       Appearance: Normal appearance. She is well-developed.   HENT:      Head: Normocephalic and atraumatic.      Right Ear: External ear normal.      Left Ear: External ear normal.      Mouth/Throat:      Pharynx: No oropharyngeal exudate.   Eyes:      Conjunctiva/sclera: Conjunctivae normal.      Pupils: Pupils are equal, round, and reactive to light.   Cardiovascular:      Rate and Rhythm: Normal rate and regular rhythm.      Pulses: Normal pulses.      Heart sounds: Normal heart sounds. No murmur heard.    No friction rub. No gallop.   Pulmonary:      Effort: Pulmonary effort is normal.      Breath sounds: Normal breath sounds. No wheezing or rhonchi.   Abdominal:      General: Bowel sounds are normal. There is no distension.      Palpations: Abdomen is soft.      Tenderness: There is no abdominal tenderness.   Musculoskeletal:         " General: Normal range of motion.   Skin:     General: Skin is warm and dry.      Capillary Refill: Capillary refill takes less than 2 seconds.      Comments: Right upper back ulcerated 1.5cm skin colored raised lesion.   Neurological:      General: No focal deficit present.      Mental Status: She is alert and oriented to person, place, and time.      Cranial Nerves: No cranial nerve deficit.   Psychiatric:         Mood and Affect: Mood and affect normal.         Behavior: Behavior normal.         Thought Content: Thought content normal.         Judgment: Judgment normal.        Result Review :          Biopsy    Date/Time: 1/23/2023 4:59 PM  Performed by: Chi Jasso MD  Authorized by: Chi Jasso MD     Procedure Details - Skin Biopsy:     Body area: trunk    Trunk location: back    Initial size (mm): 15    Final defect size (mm): 20    Malignancy: malignancy unknown      Destruction method comment: full-thickness excision with 2mm borders    Cosmetic: no.      Comments:   Area identified and made sterile with betadine.  Sterile technique used for entire procedure.  Area numbed with 2ml 1% lidocaine with epi (NDC 37969-880-68 lot [id number], [id number] exp: 02/24.  #15 blade used to make elliptical incision and lesion removed full-thickness with 2mm borders.  Lesion sent to pathology.  Wound closed with 5 simple interupted sutures using 3-0 ethylene sutures.  Hemostasis achieved.  Pt tolerated procedure well.  No complications.  Minimal blood loss.  Discussed wound care with pt and need to remove sutures in 14 days.  Wound covered with antibiotic ointment and bandage.          Assessment and Plan    Diagnoses and all orders for this visit:    1. Skin lesion of back (Primary)  -     Biopsy  -     Tissue Pathology Exam    2. 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  Patient was given instructions and counseling regarding her condition or for health maintenance advice. Please see specific information pulled into the AVS if appropriate.       Answers for HPI/ROS submitted by the patient on 1/21/2023  Please describe your symptoms.: Skin lesion on my back  Have you had these symptoms before?: No  How long have you been having these symptoms?: Greater than 2 weeks  Please list any medications you are currently taking for this condition.: None  Please describe any probable cause for these symptoms. : No idea  What is the primary reason for your visit?: Other

## 2023-01-25 ENCOUNTER — TELEPHONE (OUTPATIENT)
Dept: FAMILY MEDICINE CLINIC | Facility: CLINIC | Age: 78
End: 2023-01-25
Payer: MEDICARE

## 2023-01-25 LAB
CYTO UR: NORMAL
LAB AP CASE REPORT: NORMAL
LAB AP CLINICAL INFORMATION: NORMAL
PATH REPORT.FINAL DX SPEC: NORMAL
PATH REPORT.GROSS SPEC: NORMAL

## 2023-01-25 NOTE — TELEPHONE ENCOUNTER
I called pt today at 1440 hrs and gave results of pathology that lesion was skin cancer and was squamous cell carcinoma with negative margins.  I wanted pt to see a specialist to make sure that the margins are definitely good.  She said that she will be seeing her dermatologist in 2 weeks and will have them look at the pathology report and decide if any further tissue needed to be excised.  She understood about the skin cancer and will let her dermatologist decide if anything further needed to be done.  I agreed with this plan.

## 2023-02-02 ENCOUNTER — OFFICE VISIT (OUTPATIENT)
Dept: FAMILY MEDICINE CLINIC | Facility: CLINIC | Age: 78
End: 2023-02-02
Payer: MEDICARE

## 2023-02-02 VITALS
TEMPERATURE: 97.2 F | DIASTOLIC BLOOD PRESSURE: 80 MMHG | OXYGEN SATURATION: 99 % | SYSTOLIC BLOOD PRESSURE: 125 MMHG | WEIGHT: 151 LBS | BODY MASS INDEX: 26.75 KG/M2 | HEART RATE: 75 BPM

## 2023-02-02 DIAGNOSIS — Z48.02 VISIT FOR SUTURE REMOVAL: Primary | ICD-10-CM

## 2023-02-02 PROCEDURE — 99024 POSTOP FOLLOW-UP VISIT: CPT | Performed by: FAMILY MEDICINE

## 2023-02-02 NOTE — PROGRESS NOTES
Chief Complaint  Suture / Staple Removal (SUTURE REMOVAL )    Subjective          Noni Bhardwaj presents to [facility]  History of Present Illness  Pt needs sutures removed- wound healed no signs of infection  Pt understands that lesion was squamous cell carcinoma skin cancer and will be seeing her dermatologist in 2 weeks to discuss further and get any further treatment.  Suture / Staple Removal  Treated in ED: placed on 1/23/23. She tried antibiotic ointment use since the wound repair. The treatment provided significant relief. There has been no drainage from the wound. There is no redness present. There is no swelling present. There is no pain present. She has no difficulty moving the affected extremity or digit.       Objective   No Known Allergies  Immunization History   Administered Date(s) Administered   • COVID-19 (PFIZER) BIVALENT BOOSTER 12+YRS 10/03/2022   • COVID-19 (PFIZER) PURPLE CAP 02/11/2021, 03/04/2021, 10/05/2021   • Fluad Quad 65+ 10/14/2021, 10/10/2022   • Pneumococcal Conjugate 20-Valent (PCV20) 09/20/2022   • Shingrix 10/20/2020     Past Medical History:   Diagnosis Date   • A-fib (HCC)    • Cancer (HCC) Basal cell? cheek    2022   • Cataract    • Coronary artery disease 2019    AFib   • Disease of thyroid gland    • Stroke (HCC) 2019    TIAs      Past Surgical History:   Procedure Laterality Date   • APPENDECTOMY     • BACK SURGERY     • CARDIAC CATHETERIZATION  2019    Loop inserted   • COLONOSCOPY     • EYE SURGERY  2017    Sadi cataracts   • HYSTERECTOMY     • SPINE SURGERY  2013    Lumbar laminectomy      Social History     Socioeconomic History   • Marital status:    Tobacco Use   • Smoking status: Never   • Smokeless tobacco: Never   • Tobacco comments:     1/1/1900   Vaping Use   • Vaping Use: Never used   Substance and Sexual Activity   • Alcohol use: Yes     Alcohol/week: 1.0 standard drink     Types: 1 Drinks containing 0.5 oz of alcohol per  week     Comment: Once a month   • Drug use: Never   • Sexual activity: Not Currently     Birth control/protection: Birth control pill, Hysterectomy        Current Outpatient Medications:   •  acetaminophen (TYLENOL) 500 MG tablet, Take 500 mg by mouth., Disp: , Rfl:   •  Eliquis 5 MG tablet tablet, Take 5 mg by mouth 2 (Two) Times a Day., Disp: , Rfl:   •  levothyroxine (SYNTHROID, LEVOTHROID) 75 MCG tablet, TAKE 1 TABLET BY MOUTH DAILY 6 DAYS PER WEEK, Disp: , Rfl:   •  metoprolol succinate XL (TOPROL-XL) 25 MG 24 hr tablet, Take 12.5 mg by mouth Daily., Disp: , Rfl:   •  dofetilide (TIKOSYN) 500 MCG capsule, Take 500 mcg by mouth 2 (Two) Times a Day., Disp: , Rfl:    Family History   Problem Relation Age of Onset   • Heart disease Mother    • Vision loss Mother         Maculardegeneration/legally blind   • COPD Father    • Heart disease Father    • Mental illness Sister         bipolar   • Colon cancer Neg Hx           Vital Signs:   Vitals:    02/02/23 1640   BP: 125/80   Pulse: 75   Temp: 97.2 °F (36.2 °C)   SpO2: 99%   Weight: 68.5 kg (151 lb)       Review of Systems   Physical Exam  Vitals reviewed.   Constitutional:       Appearance: Normal appearance. She is well-developed.   HENT:      Head: Normocephalic and atraumatic.      Right Ear: External ear normal.      Left Ear: External ear normal.      Mouth/Throat:      Pharynx: No oropharyngeal exudate.   Eyes:      Conjunctiva/sclera: Conjunctivae normal.      Pupils: Pupils are equal, round, and reactive to light.   Cardiovascular:      Rate and Rhythm: Normal rate and regular rhythm.      Pulses: Normal pulses.      Heart sounds: Normal heart sounds. No murmur heard.    No friction rub. No gallop.   Pulmonary:      Effort: Pulmonary effort is normal.      Breath sounds: Normal breath sounds. No wheezing or rhonchi.   Abdominal:      General: Bowel sounds are normal. There is no distension.      Palpations: Abdomen is soft.      Tenderness: There is no  abdominal tenderness.   Skin:     General: Skin is warm and dry.      Capillary Refill: Capillary refill takes less than 2 seconds.      Comments: Right upper back wound healed, no redness, warmth, swelling, tenderness, or discharge.   Neurological:      General: No focal deficit present.      Mental Status: She is alert and oriented to person, place, and time.      Cranial Nerves: No cranial nerve deficit.   Psychiatric:         Mood and Affect: Mood and affect normal.         Behavior: Behavior normal.         Thought Content: Thought content normal.         Judgment: Judgment normal.        Result Review :          Suture Removal    Date/Time: 2/2/2023 5:27 PM  Performed by: Chi Jasso MD  Authorized by: Chi Jasso MD   Body area: trunk  Location details: back  Wound Appearance: clean  Sutures Removed: 5  Post-removal: dressing applied, Steri-Strips applied and antibiotic ointment applied  Facility: sutures placed in this facility  Patient tolerance: patient tolerated the procedure well with no immediate complications  Comments: Wound remained closed.            Assessment and Plan    Diagnoses and all orders for this visit:    1. Visit for suture removal (Primary)    Other orders  -     Suture Removal            Follow Up   Return if symptoms worsen or fail to improve.  Patient was given instructions and counseling regarding her condition or for health maintenance advice. Please see specific information pulled into the AVS if appropriate.       Answers for HPI/ROS submitted by the patient on 1/30/2023  Please describe your symptoms.: Sutures removed.  Have you had these symptoms before?: No  How long have you been having these symptoms?: 1-2 weeks  What is the primary reason for your visit?: Other

## 2023-03-23 ENCOUNTER — OFFICE VISIT (OUTPATIENT)
Dept: ORTHOPEDIC SURGERY | Facility: CLINIC | Age: 78
End: 2023-03-23
Payer: MEDICARE

## 2023-03-23 VITALS — HEIGHT: 63 IN | BODY MASS INDEX: 27.46 KG/M2 | WEIGHT: 155 LBS

## 2023-03-23 DIAGNOSIS — M25.531 BILATERAL WRIST PAIN: Primary | ICD-10-CM

## 2023-03-23 DIAGNOSIS — M65.4 TENDINITIS, DE QUERVAIN'S: ICD-10-CM

## 2023-03-23 DIAGNOSIS — M25.532 BILATERAL WRIST PAIN: Primary | ICD-10-CM

## 2023-03-23 DIAGNOSIS — M18.0 OSTEOARTHRITIS OF CARPOMETACARPAL (CMC) JOINTS OF BOTH THUMBS, UNSPECIFIED OSTEOARTHRITIS TYPE: ICD-10-CM

## 2023-03-23 PROCEDURE — 99213 OFFICE O/P EST LOW 20 MIN: CPT | Performed by: ORTHOPAEDIC SURGERY

## 2023-03-23 PROCEDURE — 20600 DRAIN/INJ JOINT/BURSA W/O US: CPT | Performed by: ORTHOPAEDIC SURGERY

## 2023-03-23 RX ORDER — LIDOCAINE HYDROCHLORIDE 10 MG/ML
1 INJECTION, SOLUTION INFILTRATION; PERINEURAL
Status: COMPLETED | OUTPATIENT
Start: 2023-03-23 | End: 2023-03-23

## 2023-03-23 RX ORDER — TRIAMCINOLONE ACETONIDE 40 MG/ML
40 INJECTION, SUSPENSION INTRA-ARTICULAR; INTRAMUSCULAR
Status: COMPLETED | OUTPATIENT
Start: 2023-03-23 | End: 2023-03-23

## 2023-03-23 RX ADMIN — LIDOCAINE HYDROCHLORIDE 1 ML: 10 INJECTION, SOLUTION INFILTRATION; PERINEURAL at 15:12

## 2023-03-23 RX ADMIN — TRIAMCINOLONE ACETONIDE 40 MG: 40 INJECTION, SUSPENSION INTRA-ARTICULAR; INTRAMUSCULAR at 15:12

## 2023-03-23 NOTE — PROGRESS NOTES
"Chief Complaint  Pain and Follow-up of the Left Wrist     Subjective      Noni Bhardwaj presents to [facility] for follow up evaluation of the left wrist. The patient reports left wrist pain. She locates pain to the radial wrist and thumb pain. She has previously been diagnosed with right thumb CMC osteoarthritis and de Quervain's. She gets injections for this. She has developed left wrist pain that is similar.     No Known Allergies     Social History     Socioeconomic History   • Marital status:    Tobacco Use   • Smoking status: Never   • Smokeless tobacco: Never   • Tobacco comments:     1/1/1900   Vaping Use   • Vaping Use: Never used   Substance and Sexual Activity   • Alcohol use: Yes     Alcohol/week: 1.0 standard drink     Types: 1 Drinks containing 0.5 oz of alcohol per week     Comment: Once a month   • Drug use: Never   • Sexual activity: Not Currently     Birth control/protection: Birth control pill, Hysterectomy        Review of Systems     Objective   Vital Signs:   Ht 160 cm (63\")   Wt 70.3 kg (155 lb)   BMI 27.46 kg/m²       Physical Exam  Constitutional:       Appearance: Normal appearance. The patient is well-developed and normal weight.   HENT:      Head: Normocephalic.      Right Ear: Hearing and external ear normal.      Left Ear: Hearing and external ear normal.      Nose: Nose normal.   Eyes:      Conjunctiva/sclera: Conjunctivae normal.   Cardiovascular:      Rate and Rhythm: Normal rate.   Pulmonary:      Effort: Pulmonary effort is normal.      Breath sounds: No wheezing or rales.   Abdominal:      Palpations: Abdomen is soft.      Tenderness: There is no abdominal tenderness.   Musculoskeletal:      Cervical back: Normal range of motion.   Skin:     Findings: No rash.   Neurological:      Mental Status: The patient is alert and oriented to person, place, and time.   Psychiatric:         Mood and Affect: Mood and affect normal.         Judgment: " Judgment normal.       Ortho Exam      Left wrist- positive grind testing to the thumb. Tender to the Thumb CMC joint. Neurovascularly intact. Sensation to light touch median, radial, ulnar nerve. Positive AIN, PIN, ulnar nerve. Positive pulses. positive     Left thumb: L thumb CMC  Consent given by: patient  Site marked: site marked  Timeout: Immediately prior to procedure a time out was called to verify the correct patient, procedure, equipment, support staff and site/side marked as required   Supporting Documentation  Indications: pain   Procedure Details  Location: thumb - L thumb CMC  Preparation: Patient was prepped and draped in the usual sterile fashion  Needle gauge: 23G.  Medications administered: 40 mg triamcinolone acetonide 40 MG/ML; 1 mL lidocaine 1 %  Patient tolerance: patient tolerated the procedure well with no immediate complications          X-Ray Report:  Right wrist  X-Ray  Indication: Evaluation of right wrist pain  AP/Lateral view(s)  Findings: advanced degenerative changes to the  thumb, bone on bone articulation to the CMC joint. No acute fracture. Dorsal subluxation of the metacarpal base.   Prior studies available for comparison: yes     X-Ray Report:  Left wrist  X-Ray  Indication: Evaluation of left wrist pain  AP/Lateral view(s)  Findings: advanced degenerative changes to the left thumb, bone on bone articulation to the CMC joint. No acute fracture.   Prior studies available for comparison: yes         Imaging Results (Most Recent)     Procedure Component Value Units Date/Time    XR Wrist 2 View Right [[id number]] Resulted: 03/23/23 1400     Updated: 03/23/23 1402    XR Wrist 2 View Left [[id number]] Resulted: 03/23/23 1400     Updated: 03/23/23 1402           Result Review :       No results found.           Assessment and Plan     Diagnoses and all orders for this visit:    1. Bilateral wrist pain (Primary)  -     XR Wrist 2 View Right  -     XR Wrist 2 View Left    2. Osteoarthritis  of carpometacarpal (CMC) joints of both thumbs, unspecified osteoarthritis type    3. Tendinitis, de Quervain's        Discussed the treatment plan with the patient.  I reviewed the x-rays that were obtained today with the patient. Discussed the risks and benefits of left thumb CMC injection. The patient expressed understanding and wished to proceed. She tolerated the injections well. bilateral thumb braces given today.     Call or return if worsening symptoms.    Follow Up     PRN      Patient was given instructions and counseling regarding her condition or for health maintenance advice. Please see specific information pulled into the AVS if appropriate.     Scribed for Linus Ngo MD by Kristen Machado.  03/23/23   14:02 EDT    I have personally performed the services described in this document as scribed by the above individual and it is both accurate and complete. Linus Ngo MD 03/23/23

## 2023-03-30 ENCOUNTER — HOSPITAL ENCOUNTER (OUTPATIENT)
Dept: BONE DENSITY | Facility: HOSPITAL | Age: 78
Discharge: HOME | End: 2023-03-30
Admitting: FAMILY MEDICINE
Payer: MEDICARE

## 2023-03-30 PROCEDURE — 77080 DXA BONE DENSITY AXIAL: CPT

## 2023-04-17 ENCOUNTER — OFFICE VISIT (OUTPATIENT)
Dept: FAMILY MEDICINE CLINIC | Facility: CLINIC | Age: 78
End: 2023-04-17
Payer: MEDICARE

## 2023-04-17 VITALS
SYSTOLIC BLOOD PRESSURE: 132 MMHG | OXYGEN SATURATION: 99 % | HEIGHT: 61 IN | WEIGHT: 150.2 LBS | BODY MASS INDEX: 28.36 KG/M2 | TEMPERATURE: 97.1 F | HEART RATE: 68 BPM | DIASTOLIC BLOOD PRESSURE: 78 MMHG

## 2023-04-17 DIAGNOSIS — M81.0 AGE-RELATED OSTEOPOROSIS WITHOUT CURRENT PATHOLOGICAL FRACTURE: Primary | ICD-10-CM

## 2023-04-17 PROCEDURE — 1159F MED LIST DOCD IN RCRD: CPT | Performed by: FAMILY MEDICINE

## 2023-04-17 PROCEDURE — 1160F RVW MEDS BY RX/DR IN RCRD: CPT | Performed by: FAMILY MEDICINE

## 2023-04-17 PROCEDURE — 99213 OFFICE O/P EST LOW 20 MIN: CPT | Performed by: FAMILY MEDICINE

## 2023-04-17 NOTE — PROGRESS NOTES
Chief Complaint  Osteoporosis (Follow up on Dexa Scan and results of labs. )    Subjective          Noni Bhardwaj presents to [facility]  History of Present Illness  Discussed dexa scan- osteopororsis- discussed txs- pt refuses any medicines at this time- pt will start strengthening exercises and take calcium 1200mg daily with vitamin D 600-800 units  Discussed labs      Objective   No Known Allergies  Immunization History   Administered Date(s) Administered   • COVID-19 (PFIZER) BIVALENT BOOSTER 12+YRS 10/03/2022   • COVID-19 (PFIZER) PURPLE CAP 02/11/2021, 03/04/2021, 10/05/2021   • Fluad Quad 65+ 10/14/2021, 10/10/2022   • Pneumococcal Conjugate 20-Valent (PCV20) 09/20/2022   • Shingrix 10/20/2020     Past Medical History:   Diagnosis Date   • A-fib    • Cancer Basal cell? cheek    2022   • Cataract    • Coronary artery disease 2019    AFib   • Disease of thyroid gland    • Stroke 2019    TIAs      Past Surgical History:   Procedure Laterality Date   • APPENDECTOMY     • BACK SURGERY     • CARDIAC CATHETERIZATION  2019    Loop inserted   • COLONOSCOPY     • EYE SURGERY  2017    Sadi cataracts   • HYSTERECTOMY     • SPINE SURGERY  2013    Lumbar laminectomy      Social History     Socioeconomic History   • Marital status:    Tobacco Use   • Smoking status: Never   • Smokeless tobacco: Never   • Tobacco comments:     1/1/1900   Vaping Use   • Vaping Use: Never used   Substance and Sexual Activity   • Alcohol use: Yes     Alcohol/week: 1.0 standard drink     Types: 1 Drinks containing 0.5 oz of alcohol per week     Comment: Once a month   • Drug use: Never   • Sexual activity: Not Currently     Birth control/protection: Birth control pill, Hysterectomy        Current Outpatient Medications:   •  dofetilide (TIKOSYN) 500 MCG capsule, Take 500 mcg by mouth 2 (Two) Times a Day., Disp: , Rfl:   •  Eliquis 5 MG tablet tablet, Take 1 tablet by mouth 2 (Two) Times a Day., Disp: ,  "Rfl:   •  levothyroxine (SYNTHROID, LEVOTHROID) 75 MCG tablet, TAKE 1 TABLET BY MOUTH DAILY 6 DAYS PER WEEK, Disp: , Rfl:   •  metoprolol succinate XL (TOPROL-XL) 25 MG 24 hr tablet, Take 12.5 mg by mouth Daily., Disp: , Rfl:    Family History   Problem Relation Age of Onset   • Heart disease Mother    • Vision loss Mother         Maculardegeneration/legally blind   • COPD Father    • Heart disease Father    • Mental illness Sister         bipolar   • Colon cancer Neg Hx           Vital Signs:   Vitals:    04/17/23 1035   BP: 132/78   Pulse: 68   Temp: 97.1 °F (36.2 °C)   SpO2: 99%   Weight: 68.1 kg (150 lb 3.2 oz)   Height: 155.6 cm (61.25\")       Review of Systems   Constitutional: Negative for fatigue and fever.   HENT: Negative for sore throat.    Eyes: Negative for visual disturbance.   Respiratory: Negative for cough, chest tightness, shortness of breath and wheezing.    Cardiovascular: Negative for chest pain, palpitations and leg swelling.   Gastrointestinal: Negative for abdominal pain, diarrhea, nausea and vomiting.   Skin: Negative for rash.   Neurological: Negative for light-headedness and headaches.   Psychiatric/Behavioral: Negative for decreased concentration, dysphoric mood, sleep disturbance and suicidal ideas. The patient is not nervous/anxious.       Physical Exam  Vitals reviewed.   Constitutional:       Appearance: Normal appearance. She is well-developed.   HENT:      Head: Normocephalic and atraumatic.      Right Ear: External ear normal.      Left Ear: External ear normal.      Mouth/Throat:      Pharynx: No oropharyngeal exudate.   Eyes:      Conjunctiva/sclera: Conjunctivae normal.      Pupils: Pupils are equal, round, and reactive to light.   Cardiovascular:      Rate and Rhythm: Normal rate and regular rhythm.      Pulses: Normal pulses.      Heart sounds: Normal heart sounds. No murmur heard.    No friction rub. No gallop.   Pulmonary:      Effort: Pulmonary effort is normal.      Breath " sounds: Normal breath sounds. No wheezing or rhonchi.   Abdominal:      General: Bowel sounds are normal. There is no distension.      Palpations: Abdomen is soft.      Tenderness: There is no abdominal tenderness.   Musculoskeletal:         General: Normal range of motion.   Skin:     General: Skin is warm and dry.      Capillary Refill: Capillary refill takes less than 2 seconds.   Neurological:      General: No focal deficit present.      Mental Status: She is alert and oriented to person, place, and time.      Cranial Nerves: No cranial nerve deficit.   Psychiatric:         Mood and Affect: Mood and affect normal.         Behavior: Behavior normal.         Thought Content: Thought content normal.         Judgment: Judgment normal.        Result Review :                 Assessment and Plan    There are no diagnoses linked to this encounter.        Follow Up   No follow-ups on file.  Patient was given instructions and counseling regarding her condition or for health maintenance advice. Please see specific information pulled into the AVS if appropriate.       Answers for HPI/ROS submitted by the patient on 4/17/2023  Please describe your symptoms.: Follow up for lab results in January and Bone Density in April. Did not hear back from the office following these tests and would like to discuss them.  Have you had these symptoms before?: No  How long have you been having these symptoms?: 1-4 days  Please list any medications you are currently taking for this condition.: n/a  What is the primary reason for your visit?: Other    Pt wants to recheck dexa scan in 1 yr after trying calcium vitamin D and exercise.

## 2023-04-17 NOTE — PROGRESS NOTES
Chief Complaint  Osteoporosis (Follow up on Dexa Scan and results of labs. )    Subjective          Noni Bhardwaj presents to [facility]  History of Present Illness  Discussed dexa scan- osteopororsis- discussed txs- pt refuses any medicines at this time- pt will start strengthening exercises and take calcium 1200mg daily with vitamin D 600-800 units  Discussed labs  Osteoporosis  Pertinent negatives include no abdominal pain, chest pain, coughing, fatigue, fever, headaches, nausea, rash, sore throat or vomiting.       Objective   No Known Allergies  Immunization History   Administered Date(s) Administered   • COVID-19 (PFIZER) BIVALENT BOOSTER 12+YRS 10/03/2022   • COVID-19 (PFIZER) PURPLE CAP 02/11/2021, 03/04/2021, 10/05/2021   • Fluad Quad 65+ 10/14/2021, 10/10/2022   • Pneumococcal Conjugate 20-Valent (PCV20) 09/20/2022   • Shingrix 10/20/2020     Past Medical History:   Diagnosis Date   • A-fib    • Cancer Basal cell? cheek    2022   • Cataract    • Coronary artery disease 2019    AFib   • Disease of thyroid gland    • Stroke 2019    TIAs      Past Surgical History:   Procedure Laterality Date   • APPENDECTOMY     • BACK SURGERY     • CARDIAC CATHETERIZATION  2019    Loop inserted   • COLONOSCOPY     • EYE SURGERY  2017    Sadi cataracts   • HYSTERECTOMY     • SPINE SURGERY  2013    Lumbar laminectomy      Social History     Socioeconomic History   • Marital status:    Tobacco Use   • Smoking status: Never   • Smokeless tobacco: Never   • Tobacco comments:     1/1/1900   Vaping Use   • Vaping Use: Never used   Substance and Sexual Activity   • Alcohol use: Yes     Alcohol/week: 1.0 standard drink     Types: 1 Drinks containing 0.5 oz of alcohol per week     Comment: Once a month   • Drug use: Never   • Sexual activity: Not Currently     Birth control/protection: Birth control pill, Hysterectomy        Current Outpatient Medications:   •  dofetilide (TIKOSYN) 500 MCG  "capsule, Take 500 mcg by mouth 2 (Two) Times a Day., Disp: , Rfl:   •  Eliquis 5 MG tablet tablet, Take 1 tablet by mouth 2 (Two) Times a Day., Disp: , Rfl:   •  levothyroxine (SYNTHROID, LEVOTHROID) 75 MCG tablet, TAKE 1 TABLET BY MOUTH DAILY 6 DAYS PER WEEK, Disp: , Rfl:   •  metoprolol succinate XL (TOPROL-XL) 25 MG 24 hr tablet, Take 12.5 mg by mouth Daily., Disp: , Rfl:    Family History   Problem Relation Age of Onset   • Heart disease Mother    • Vision loss Mother         Maculardegeneration/legally blind   • COPD Father    • Heart disease Father    • Mental illness Sister         bipolar   • Colon cancer Neg Hx           Vital Signs:   Vitals:    04/17/23 1035   BP: 132/78   Pulse: 68   Temp: 97.1 °F (36.2 °C)   SpO2: 99%   Weight: 68.1 kg (150 lb 3.2 oz)   Height: 155.6 cm (61.25\")       Review of Systems   Constitutional: Negative for fatigue and fever.   HENT: Negative for sore throat.    Eyes: Negative for visual disturbance.   Respiratory: Negative for cough, chest tightness, shortness of breath and wheezing.    Cardiovascular: Negative for chest pain, palpitations and leg swelling.   Gastrointestinal: Negative for abdominal pain, diarrhea, nausea and vomiting.   Skin: Negative for rash.   Neurological: Negative for light-headedness and headaches.   Psychiatric/Behavioral: Negative for decreased concentration, dysphoric mood, sleep disturbance and suicidal ideas. The patient is not nervous/anxious.       Physical Exam  Vitals reviewed.   Constitutional:       Appearance: Normal appearance. She is well-developed.   HENT:      Head: Normocephalic and atraumatic.      Right Ear: External ear normal.      Left Ear: External ear normal.      Mouth/Throat:      Pharynx: No oropharyngeal exudate.   Eyes:      Conjunctiva/sclera: Conjunctivae normal.      Pupils: Pupils are equal, round, and reactive to light.   Cardiovascular:      Rate and Rhythm: Normal rate and regular rhythm.      Pulses: Normal pulses. "      Heart sounds: Normal heart sounds. No murmur heard.    No friction rub. No gallop.   Pulmonary:      Effort: Pulmonary effort is normal.      Breath sounds: Normal breath sounds. No wheezing or rhonchi.   Abdominal:      General: Bowel sounds are normal. There is no distension.      Palpations: Abdomen is soft.      Tenderness: There is no abdominal tenderness.   Musculoskeletal:         General: Normal range of motion.   Skin:     General: Skin is warm and dry.      Capillary Refill: Capillary refill takes less than 2 seconds.   Neurological:      General: No focal deficit present.      Mental Status: She is alert and oriented to person, place, and time.      Cranial Nerves: No cranial nerve deficit.   Psychiatric:         Mood and Affect: Mood and affect normal.         Behavior: Behavior normal.         Thought Content: Thought content normal.         Judgment: Judgment normal.        Result Review :                 Assessment and Plan    There are no diagnoses linked to this encounter.        Follow Up   Return in about 6 months (around 10/17/2023) for Recheck.  Patient was given instructions and counseling regarding her condition or for health maintenance advice. Please see specific information pulled into the AVS if appropriate.       Answers for HPI/ROS submitted by the patient on 4/17/2023  Please describe your symptoms.: Follow up for lab results in January and Bone Density in April. Did not hear back from the office following these tests and would like to discuss them.  Have you had these symptoms before?: No  How long have you been having these symptoms?: 1-4 days  Please list any medications you are currently taking for this condition.: n/a  What is the primary reason for your visit?: Other    Pt wants to recheck dexa scan in 1 yr after trying calcium vitamin D and exercise.  Answers for HPI/ROS submitted by the patient on 4/17/2023  Please describe your symptoms.: Follow up for lab results in  January and Bone Density in April. Did not hear back from the office following these tests and would like to discuss them.  Have you had these symptoms before?: No  How long have you been having these symptoms?: 1-4 days  Please list any medications you are currently taking for this condition.: n/a  What is the primary reason for your visit?: Other

## 2023-05-30 RX ORDER — LEVOTHYROXINE SODIUM 0.07 MG/1
75 TABLET ORAL DAILY
Qty: 90 TABLET | Refills: 0 | Status: SHIPPED | OUTPATIENT
Start: 2023-05-30 | End: 2023-08-28

## 2023-06-02 ENCOUNTER — OFFICE VISIT (OUTPATIENT)
Dept: FAMILY MEDICINE CLINIC | Facility: CLINIC | Age: 78
End: 2023-06-02

## 2023-06-02 VITALS
DIASTOLIC BLOOD PRESSURE: 74 MMHG | TEMPERATURE: 96.9 F | OXYGEN SATURATION: 97 % | BODY MASS INDEX: 27.96 KG/M2 | HEART RATE: 69 BPM | SYSTOLIC BLOOD PRESSURE: 122 MMHG | WEIGHT: 149.2 LBS

## 2023-06-02 DIAGNOSIS — M25.512 ACUTE PAIN OF LEFT SHOULDER: Primary | ICD-10-CM

## 2023-06-02 PROCEDURE — 99213 OFFICE O/P EST LOW 20 MIN: CPT | Performed by: FAMILY MEDICINE

## 2023-06-02 RX ORDER — DEXAMETHASONE SODIUM PHOSPHATE 4 MG/ML
4 INJECTION, SOLUTION INTRA-ARTICULAR; INTRALESIONAL; INTRAMUSCULAR; INTRAVENOUS; SOFT TISSUE ONCE
Status: COMPLETED | OUTPATIENT
Start: 2023-06-02 | End: 2023-06-02

## 2023-06-02 RX ADMIN — DEXAMETHASONE SODIUM PHOSPHATE 4 MG: 4 INJECTION, SOLUTION INTRA-ARTICULAR; INTRALESIONAL; INTRAMUSCULAR; INTRAVENOUS; SOFT TISSUE at 18:02

## 2023-06-02 NOTE — PROGRESS NOTES
Chief Complaint  Pain (Collar bone/sternum pain on left side x4 days)    Subjective          Noni Bhardwaj presents to [facility]  History of Present Illness  Pt had been lifting potting soil day before pain began- then 4 days ago pain began in left shoulder, clavicle and sternum- pain has lessened some- ibuprofen helped symptoms greatly      Objective   No Known Allergies  Immunization History   Administered Date(s) Administered   • COVID-19 (PFIZER) BIVALENT 12+YRS 10/03/2022   • Fluad Quad 65+ 10/14/2021, 10/10/2022   • Pneumococcal Conjugate 20-Valent (PCV20) 09/20/2022   • Shingrix 10/20/2020     Past Medical History:   Diagnosis Date   • A-fib    • Cancer Basal cell? cheek    2022   • Cataract    • Coronary artery disease 2019    AFib   • Disease of thyroid gland    • Stroke 2019    TIAs      Past Surgical History:   Procedure Laterality Date   • APPENDECTOMY     • BACK SURGERY     • CARDIAC CATHETERIZATION  2019    Loop inserted   • COLONOSCOPY     • EYE SURGERY  2017    Sadi cataracts   • HYSTERECTOMY     • SPINE SURGERY  2013    Lumbar laminectomy      Social History     Socioeconomic History   • Marital status:    Tobacco Use   • Smoking status: Never   • Smokeless tobacco: Never   • Tobacco comments:     1/1/1900   Vaping Use   • Vaping Use: Never used   Substance and Sexual Activity   • Alcohol use: Yes     Alcohol/week: 1.0 standard drink     Types: 1 Drinks containing 0.5 oz of alcohol per week     Comment: Once a month   • Drug use: Never   • Sexual activity: Not Currently     Birth control/protection: Birth control pill, Hysterectomy        Current Outpatient Medications:   •  Eliquis 5 MG tablet tablet, Take 1 tablet by mouth 2 (Two) Times a Day., Disp: , Rfl:   •  levothyroxine (SYNTHROID, LEVOTHROID) 75 MCG tablet, Take 1 tablet by mouth Daily for 90 days., Disp: 90 tablet, Rfl: 0  •  metoprolol succinate XL (TOPROL-XL) 25 MG 24 hr tablet, Take 12.5 mg by  mouth Daily., Disp: , Rfl:   •  dofetilide (TIKOSYN) 500 MCG capsule, Take 500 mcg by mouth 2 (Two) Times a Day., Disp: , Rfl:     Current Facility-Administered Medications:   •  dexamethasone (DECADRON) injection 4 mg, 4 mg, Intramuscular, Once, Chi Jasso MD   Family History   Problem Relation Age of Onset   • Heart disease Mother    • Vision loss Mother         Maculardegeneration/legally blind   • COPD Father    • Heart disease Father    • Mental illness Sister         bipolar   • Colon cancer Neg Hx           Vital Signs:   Vitals:    06/02/23 1652   BP: 122/74   Pulse: 69   Temp: 96.9 °F (36.1 °C)   SpO2: 97%   Weight: 67.7 kg (149 lb 3.2 oz)       Review of Systems   Physical Exam  Vitals reviewed.   Constitutional:       Appearance: Normal appearance. She is well-developed.   HENT:      Head: Normocephalic and atraumatic.      Right Ear: External ear normal.      Left Ear: External ear normal.      Mouth/Throat:      Pharynx: No oropharyngeal exudate.   Eyes:      Conjunctiva/sclera: Conjunctivae normal.      Pupils: Pupils are equal, round, and reactive to light.   Cardiovascular:      Rate and Rhythm: Normal rate and regular rhythm.      Pulses: Normal pulses.      Heart sounds: Normal heart sounds. No murmur heard.    No friction rub. No gallop.   Pulmonary:      Effort: Pulmonary effort is normal.      Breath sounds: Normal breath sounds. No wheezing or rhonchi.   Abdominal:      General: Bowel sounds are normal. There is no distension.      Palpations: Abdomen is soft.      Tenderness: There is no abdominal tenderness.   Musculoskeletal:         General: Normal range of motion.      Comments: Left shoulder non-tender, normal ROM, stable, no redness, warmth, swelling, or bruising.   Skin:     General: Skin is warm and dry.   Neurological:      Mental Status: She is alert and oriented to person, place, and time.      Cranial Nerves: No cranial nerve deficit.   Psychiatric:         Mood and  Affect: Mood and affect normal.         Behavior: Behavior normal.         Thought Content: Thought content normal.         Judgment: Judgment normal.        Result Review :   The following data was reviewed by: Chi Jasso MD on 06/02/2023:    Data reviewed: Radiologic studies I viewed and interpreted 2 views left shoulder x-rays and 2 views sternum x-rays:no fxs.          Assessment and Plan    Diagnoses and all orders for this visit:    1. Acute pain of left shoulder (Primary)  -     Cancel: XR Sternoclavicular 3+ View Left  -     XR Shoulder 2+ View Left (In Office)  -     XR Sternum PA & Lateral  -     dexamethasone (DECADRON) injection 4 mg            Follow Up   Return if symptoms worsen or fail to improve.  Patient was given instructions and counseling regarding her condition or for health maintenance advice. Please see specific information pulled into the AVS if appropriate.       Answers for HPI/ROS submitted by the patient on 6/2/2023  Please describe your symptoms.: Severe pain in clavicle sternum joint from lifting heavy object.  Have you had these symptoms before?: No  How long have you been having these symptoms?: 1-4 days  Please list any medications you are currently taking for this condition.: Tylenol and 1 ibuprofen.  Please describe any probable cause for these symptoms. : Lifting large bag of potting soil.  What is the primary reason for your visit?: Other

## 2023-06-09 ENCOUNTER — OFFICE VISIT (OUTPATIENT)
Dept: ORTHOPEDIC SURGERY | Facility: CLINIC | Age: 78
End: 2023-06-09
Payer: MEDICARE

## 2023-06-09 VITALS — BODY MASS INDEX: 28.13 KG/M2 | WEIGHT: 149 LBS | HEIGHT: 61 IN

## 2023-06-09 DIAGNOSIS — M70.61 TROCHANTERIC BURSITIS OF RIGHT HIP: Primary | ICD-10-CM

## 2023-06-09 RX ADMIN — LIDOCAINE HYDROCHLORIDE 5 ML: 10 INJECTION, SOLUTION INFILTRATION; PERINEURAL at 11:20

## 2023-06-09 RX ADMIN — TRIAMCINOLONE ACETONIDE 40 MG: 40 INJECTION, SUSPENSION INTRA-ARTICULAR; INTRAMUSCULAR at 11:20

## 2023-06-09 NOTE — PROGRESS NOTES
"Chief Complaint  Pain and Follow-up of the Right Hip     Subjective      Noni Bhardwaj presents to [facility] for follow up evaluation of the right hip. The patient has been treating her right hip bursitis conservatively. She has had injections in the past with relief. She is requesting repeat injection today.     No Known Allergies     Social History     Socioeconomic History    Marital status:    Tobacco Use    Smoking status: Never    Smokeless tobacco: Never    Tobacco comments:     1/1/1900   Vaping Use    Vaping Use: Never used   Substance and Sexual Activity    Alcohol use: Yes     Alcohol/week: 1.0 standard drink     Types: 1 Drinks containing 0.5 oz of alcohol per week     Comment: Once a month    Drug use: Never    Sexual activity: Not Currently     Birth control/protection: Birth control pill, Hysterectomy        Review of Systems     Objective   Vital Signs:   Ht 155.6 cm (61.25\")   Wt 67.6 kg (149 lb)   BMI 27.92 kg/m²       Physical Exam  Constitutional:       Appearance: Normal appearance. The patient is well-developed and normal weight.   HENT:      Head: Normocephalic.      Right Ear: Hearing and external ear normal.      Left Ear: Hearing and external ear normal.      Nose: Nose normal.   Eyes:      Conjunctiva/sclera: Conjunctivae normal.   Cardiovascular:      Rate and Rhythm: Normal rate.   Pulmonary:      Effort: Pulmonary effort is normal.      Breath sounds: No wheezing or rales.   Abdominal:      Palpations: Abdomen is soft.      Tenderness: There is no abdominal tenderness.   Musculoskeletal:      Cervical back: Normal range of motion.   Skin:     Findings: No rash.   Neurological:      Mental Status: The patient is alert and oriented to person, place, and time.   Psychiatric:         Mood and Affect: Mood and affect normal.         Judgment: Judgment normal.       Ortho Exam      RIGHT HIP: Flexion to 100 degrees. Full extension. Er to 45 degrees. IR " to 35 degrees. Good tone of hip flexors, hip extensors, hip adductor, hip abductors. No pain with straight leg raise. Negative impingement test. Tender to greater trochanter.      Large Joint: Right hip  Date/Time: 6/9/2023 11:20 AM  Consent given by: patient  Site marked: site marked  Timeout: Immediately prior to procedure a time out was called to verify the correct patient, procedure, equipment, support staff and site/side marked as required   Supporting Documentation  Indications: pain   Procedure Details  Location: hip - Hip joint: right.  Needle size: 22 G  Medications administered: 5 mL lidocaine 1 %; 40 mg triamcinolone acetonide 40 MG/ML  Patient tolerance: patient tolerated the procedure well with no immediate complications        Imaging Results (Most Recent)       None             Result Review :       XR Sternum PA & Lateral    Result Date: 6/3/2023  Narrative: PROCEDURE: XR STERNUM PA AND LATERAL  COMPARISON: None  INDICATIONS: sternum pain radiating into clavical/shoulder  FINDINGS:  There is a subcutaneous loop recorder.  No fracture or destructive bone lesion of the sternum is demonstrated.  Retrosternal stripe appears normal      Impression:  No abnormality of the sternum demonstrated radiographically      TAWNY PALOMINO MD       Electronically Signed and Approved By: TAWNY PALOMINO MD on 6/03/2023 at 14:50             XR Shoulder 2+ View Left (In Office)    Result Date: 6/2/2023  Narrative: PROCEDURE: XR SHOULDER 2+ VW LEFT  COMPARISON: None  INDICATIONS: left shoulder pain  FINDINGS:  No fracture or malalignment is identified.  Mild acromioclavicular osteoarthritis is noted.  Soft tissues appear normal.      Impression:   1. Mild acromioclavicular osteoarthritis      Terrence Marcum M.D.       Electronically Signed and Approved By: Terrence Marcum M.D. on 6/02/2023 at 18:42                     Assessment and Plan     Diagnoses and all orders for this visit:    1. Trochanteric bursitis of right hip  (Primary)        Discussed the treatment plan with the patient.  Discussed the risks and benefits of a right hip bursa injection. The patient expressed understanding and wished to proceed. She tolerated the injection well.     Call or return if worsening symptoms.    Follow Up     PRN      Patient was given instructions and counseling regarding her condition or for health maintenance advice. Please see specific information pulled into the AVS if appropriate.     Scribed for Linus Ngo MD by Kristen Machado.  06/09/23   10:59 EDT    I have personally performed the services described in this document as scribed by the above individual and it is both accurate and complete. Linus Ngo MD 06/11/23

## 2023-06-11 RX ORDER — LIDOCAINE HYDROCHLORIDE 10 MG/ML
5 INJECTION, SOLUTION INFILTRATION; PERINEURAL
Status: COMPLETED | OUTPATIENT
Start: 2023-06-09 | End: 2023-06-09

## 2023-06-11 RX ORDER — TRIAMCINOLONE ACETONIDE 40 MG/ML
40 INJECTION, SUSPENSION INTRA-ARTICULAR; INTRAMUSCULAR
Status: COMPLETED | OUTPATIENT
Start: 2023-06-09 | End: 2023-06-09

## 2023-08-09 ENCOUNTER — TELEPHONE (OUTPATIENT)
Dept: FAMILY MEDICINE CLINIC | Facility: CLINIC | Age: 78
End: 2023-08-09

## 2023-08-09 NOTE — TELEPHONE ENCOUNTER
Caller: Noni Bhardwaj    Relationship: Self    Best call back number: [phone]     What is the medical concern/diagnosis: HEARING TEST NEEDED    What specialty or service is being requested: ENT OR AUDIOLOGIST    What is the office location: [facility]    Any additional details: PATIENT STATES HER GRAND KIDS THINK SHE MAY BE EXPERIENCING HEARING LOSS SO SHE WOULD LIKE TO BE TESTED.

## 2023-08-10 DIAGNOSIS — H91.93 DECREASED HEARING OF BOTH EARS: Primary | ICD-10-CM

## 2023-08-14 ENCOUNTER — TELEPHONE (OUTPATIENT)
Dept: FAMILY MEDICINE CLINIC | Facility: CLINIC | Age: 78
End: 2023-08-14
Payer: MEDICARE

## 2023-08-14 DIAGNOSIS — H90.3 SENSORINEURAL HEARING LOSS, BILATERAL: ICD-10-CM

## 2023-08-14 DIAGNOSIS — H91.93 DECREASED HEARING OF BOTH EARS: Primary | ICD-10-CM

## 2023-08-14 NOTE — TELEPHONE ENCOUNTER
Caller: Noni Bhardwaj    Relationship: Self    Best call back number: [phone]    What is the best time to reach you: ANY TIME    Who are you requesting to speak with (clinical staff, provider,  specific staff member): CLINICAL        What was the call regarding: PATIENT IS REQUESTING THE NAME OF A PROVIDER OR FACILITY WHERE SHE CAN GET A HEARING TEST SHE IS NOT ASKING FOR A REFERRAL  SHE HAS BEEN TRYING SINCE 08/09/2023 TO ACCOMPLISH THIS     Is it okay if the provider responds through MyChart: NO

## 2023-08-21 RX ORDER — LEVOTHYROXINE SODIUM 0.07 MG/1
TABLET ORAL
Qty: 90 TABLET | Refills: 0 | OUTPATIENT
Start: 2023-08-21

## 2023-08-24 ENCOUNTER — PROCEDURE VISIT (OUTPATIENT)
Dept: OTOLARYNGOLOGY | Facility: CLINIC | Age: 78
End: 2023-08-24
Payer: MEDICARE

## 2023-08-24 DIAGNOSIS — H90.A21 SENSORINEURAL HEARING LOSS (SNHL) OF RIGHT EAR WITH RESTRICTED HEARING OF LEFT EAR: Primary | ICD-10-CM

## 2023-08-24 DIAGNOSIS — H90.A22 SENSORINEURAL HEARING LOSS (SNHL) OF LEFT EAR WITH RESTRICTED HEARING OF RIGHT EAR: ICD-10-CM

## 2023-08-24 NOTE — PROGRESS NOTES
AUDIOMETRIC EVALUATION    Name:  Noni Bhardwaj  :  1945  Age:  78 y.o.  Date of Evaluation:  2023     History:   is seen today for a hearing evaluation due to hearing loss.    Audiologic Information:  Concerns for Hearing: Very little  PETs: No  Other otologic surgical history: None  Aural Pressure/Fullness: No  Otalgia: No  Otorrhea: No  Tinnitus: Periodic  Dizziness: No  Noise Exposure: No  Family history of hearing loss: No  Head trauma requiring hospital stay: No  Chemotherapy: No  Other significant history: No    EVALUATION:    See audiogram    RESULTS:    Otoscopic Evaluation:  Right: Unremarkable  Left: Unremarkable          Tympanometry (226 Hz):  Right: Type As  Left: Type A    IMPRESSIONS:  Pure tone thresholds for the right ear shows a mild sensorineural hearing loss at 0.25 K to 3K hertz.  A moderate hearing loss at 4K and 8K hertz.  Pure tone thresholds for the left ear shows a mild to moderate sensorineural hearing loss.  Patient was counseled with regard to the findings.    Amplification needs:  Patient could benefit from amplification if they feel increased communication difficulties.    RECOMMENDATIONS/PLAN:  Follow-up recommendations of her general practitioner.  Discussed results and recommendations with patient. Questions were addressed and the patient was encouraged to contact our department should concerns arise.      Will Braden M.S, [facility]  Licensed Audiologist

## 2023-09-11 RX ORDER — LEVOTHYROXINE SODIUM 0.07 MG/1
TABLET ORAL
Qty: 90 TABLET | Refills: 0 | OUTPATIENT
Start: 2023-09-11

## 2023-09-12 RX ORDER — LEVOTHYROXINE SODIUM 0.07 MG/1
TABLET ORAL
Qty: 90 TABLET | Refills: 0 | OUTPATIENT
Start: 2023-09-12

## 2023-09-12 RX ORDER — LEVOTHYROXINE SODIUM 0.07 MG/1
75 TABLET ORAL DAILY
Qty: 30 TABLET | Refills: 0 | Status: SHIPPED | OUTPATIENT
Start: 2023-09-12 | End: 2023-12-11

## 2023-09-12 NOTE — TELEPHONE ENCOUNTER
Caller: Noni Bhardwaj    Relationship: Self    Best call back number: [phone]     Requested Prescriptions:   Requested Prescriptions     Pending Prescriptions Disp Refills    levothyroxine (SYNTHROID, LEVOTHROID) 75 MCG tablet 90 tablet 0     Sig: Take 1 tablet by mouth Daily for 90 days.        Pharmacy where request should be sent: [address] [phone] [facility] [phone]      Last office visit with prescribing clinician: 6/2/2023   Last telemedicine visit with prescribing clinician: Visit date not found   Next office visit with prescribing clinician: 9/19/2023     Additional details provided by patient: PATIENT STATES SHE DOES NOT HAVE ENOUGH TO LAST HER UNTIL HER MEDICATION REFILL APPOINTMENT ON 9/19/23.    Does the patient have less than a 3 day supply:  [x] Yes  [] No    Lul Forrest Rep   09/12/23 11:58 EDT

## 2023-09-19 ENCOUNTER — OFFICE VISIT (OUTPATIENT)
Dept: FAMILY MEDICINE CLINIC | Facility: CLINIC | Age: 78
End: 2023-09-19
Payer: MEDICARE

## 2023-09-19 VITALS
BODY MASS INDEX: 28.32 KG/M2 | TEMPERATURE: 97.5 F | WEIGHT: 150 LBS | HEIGHT: 61 IN | DIASTOLIC BLOOD PRESSURE: 72 MMHG | SYSTOLIC BLOOD PRESSURE: 110 MMHG | OXYGEN SATURATION: 98 % | HEART RATE: 93 BPM

## 2023-09-19 DIAGNOSIS — E78.2 MIXED HYPERLIPIDEMIA: ICD-10-CM

## 2023-09-19 DIAGNOSIS — D64.9 ANEMIA, UNSPECIFIED TYPE: ICD-10-CM

## 2023-09-19 DIAGNOSIS — E03.9 HYPOTHYROIDISM, UNSPECIFIED TYPE: Primary | ICD-10-CM

## 2023-09-19 PROCEDURE — 1160F RVW MEDS BY RX/DR IN RCRD: CPT | Performed by: FAMILY MEDICINE

## 2023-09-19 PROCEDURE — 99213 OFFICE O/P EST LOW 20 MIN: CPT | Performed by: FAMILY MEDICINE

## 2023-09-19 PROCEDURE — 1159F MED LIST DOCD IN RCRD: CPT | Performed by: FAMILY MEDICINE

## 2023-09-19 RX ORDER — LEVOTHYROXINE SODIUM 0.07 MG/1
75 TABLET ORAL DAILY
Qty: 90 TABLET | Refills: 1 | Status: SHIPPED | OUTPATIENT
Start: 2023-09-19

## 2023-09-19 NOTE — PROGRESS NOTES
Chief Complaint  Hypothyroidism (Refills and labs)    Anne Bhardwaj presents to [facility]  History of Present Illness  Pt has h/o previous anemia that was controlled on last labs  Hypothyroidism  This is a chronic problem. The current episode started more than 1 year ago. The problem occurs intermittently. The problem has been unchanged. Pertinent negatives include no abdominal pain, anorexia, arthralgias, change in bowel habit, chest pain, chills, congestion, coughing, diaphoresis, fatigue, fever, headaches, joint swelling, myalgias, nausea, neck pain, numbness, rash, sore throat, swollen glands, urinary symptoms, vertigo, visual change, vomiting or weakness. Nothing aggravates the symptoms. Treatments tried: synthroid. The treatment provided significant relief.              Objective   No Known Allergies  Immunization History   Administered Date(s) Administered    COVID-19 (PFIZER) BIVALENT 12+YRS 10/03/2022    COVID-19 (PFIZER) Purple Cap Monovalent 02/11/2021, 03/04/2021, 10/05/2021    Fluad Quad 65+ 10/14/2021, 10/10/2022    Pneumococcal Conjugate 20-Valent (PCV20) 09/20/2022    Shingrix 10/20/2020     Past Medical History:   Diagnosis Date    A-fib     Cancer Basal cell? cheek    2022    Cataract     Coronary artery disease 2019    AFib    Disease of thyroid gland     Hypothyroidism     Stroke 2019    TIAs      Past Surgical History:   Procedure Laterality Date    APPENDECTOMY      BACK SURGERY      CARDIAC CATHETERIZATION  2019    Loop inserted    COLONOSCOPY      EYE SURGERY  2017    Sadi cataracts    HYSTERECTOMY      SPINE SURGERY  2013    Lumbar laminectomy    TONSILLECTOMY        Social History     Socioeconomic History    Marital status:    Tobacco Use    Smoking status: Never     Passive exposure: Past    Smokeless tobacco: Never    Tobacco comments:     1/1/1900   Vaping Use    Vaping Use: Never used   Substance and Sexual Activity     "Alcohol use: Yes     Alcohol/week: 2.0 standard drinks     Types: 1 Shots of liquor, 1 Drinks containing 0.5 oz of alcohol per week     Comment: Once a month    Drug use: Never    Sexual activity: Not Currently     Partners: Male     Birth control/protection: Hysterectomy        Current Outpatient Medications:     Eliquis 5 MG tablet tablet, Take 1 tablet by mouth 2 (Two) Times a Day., Disp: , Rfl:     metoprolol succinate XL (TOPROL-XL) 25 MG 24 hr tablet, Take 0.5 tablets by mouth Daily., Disp: , Rfl:     dofetilide (TIKOSYN) 500 MCG capsule, Take 1 capsule by mouth 2 (Two) Times a Day., Disp: , Rfl:     levothyroxine (Synthroid) 75 MCG tablet, Take 1 tablet by mouth Daily., Disp: 90 tablet, Rfl: 1   Family History   Problem Relation Age of Onset    Heart disease Mother     Vision loss Mother         Maculardegeneration/legally blind    COPD Father     Heart disease Father     Mental illness Sister         bipolar    Colon cancer Neg Hx           Vital Signs:   Vitals:    09/19/23 1712   BP: 110/72   BP Location: Left arm   Pulse: 93   Temp: 97.5 °F (36.4 °C)   SpO2: 98%   Weight: 68 kg (150 lb)   Height: 154.9 cm (61\")       Review of Systems   Constitutional:  Negative for chills, diaphoresis, fatigue and fever.   HENT:  Negative for congestion and sore throat.    Respiratory:  Negative for cough.    Cardiovascular:  Negative for chest pain.   Gastrointestinal:  Negative for abdominal pain, anorexia, change in bowel habit, nausea and vomiting.   Musculoskeletal:  Negative for arthralgias, joint swelling, myalgias and neck pain.   Skin:  Negative for rash.   Neurological:  Negative for vertigo, weakness, numbness and headaches.    Physical Exam  Vitals reviewed.   Constitutional:       Appearance: Normal appearance. She is well-developed.   HENT:      Head: Normocephalic and atraumatic.      Right Ear: External ear normal.      Left Ear: External ear normal.      Mouth/Throat:      Pharynx: No oropharyngeal " exudate.   Eyes:      Conjunctiva/sclera: Conjunctivae normal.      Pupils: Pupils are equal, round, and reactive to light.   Cardiovascular:      Pulses: Normal pulses.   Pulmonary:      Effort: Pulmonary effort is normal.   Abdominal:      General: There is no distension.      Palpations: Abdomen is soft.   Musculoskeletal:         General: Normal range of motion.      Cervical back: Normal range of motion and neck supple.   Skin:     General: Skin is warm and dry.      Capillary Refill: Capillary refill takes less than 2 seconds.   Neurological:      General: No focal deficit present.      Mental Status: She is alert and oriented to person, place, and time.      Cranial Nerves: No cranial nerve deficit.   Psychiatric:         Mood and Affect: Mood and affect normal.         Behavior: Behavior normal.         Thought Content: Thought content normal.         Judgment: Judgment normal.      Result Review :   The following data was reviewed by: Chi Jasso MD on 09/19/2023:  CMP          10/27/2022    10:41 1/19/2023    10:25   CMP   Glucose 69  77    BUN 22  15    Creatinine 1.07  0.95    EGFR 53.6  61.8    Sodium 141  141    Potassium 4.2  4.5    Chloride 105  105    Calcium 9.9  9.7    Total Protein  6.6    Albumin  4.1    Globulin  2.5    Total Bilirubin  0.5    Alkaline Phosphatase  65    AST (SGOT)  22    ALT (SGPT)  12    Albumin/Globulin Ratio  1.6    BUN/Creatinine Ratio 20.6  15.8    Anion Gap 9.0  9.0      CBC          1/19/2023    10:25   CBC   WBC 6.84    RBC 4.19    Hemoglobin 12.5    Hematocrit 38.6    MCV 92.1    MCH 29.8    MCHC 32.4    RDW 12.5    Platelets 248      Lipid Panel          1/19/2023    10:25   Lipid Panel   Total Cholesterol 220    Triglycerides 82    HDL Cholesterol 66    VLDL Cholesterol 14    LDL Cholesterol  140    LDL/HDL Ratio 2.08      TSH          1/19/2023    10:25   TSH   TSH 0.986                Assessment and Plan    Diagnoses and all orders for this visit:    1.  Hypothyroidism, unspecified type (Primary)  -     levothyroxine (Synthroid) 75 MCG tablet; Take 1 tablet by mouth Daily.  Dispense: 90 tablet; Refill: 1  -     TSH+Free T4    2. Mixed hyperlipidemia  -     Comprehensive Metabolic Panel  -     Lipid Panel    3. Anemia, unspecified type  -     CBC Auto Differential            Follow Up   Return in about 6 months (around 3/19/2024) for Recheck.  Patient was given instructions and counseling regarding her condition or for health maintenance advice. Please see specific information pulled into the AVS if appropriate.

## 2023-09-22 ENCOUNTER — CLINICAL SUPPORT (OUTPATIENT)
Dept: FAMILY MEDICINE CLINIC | Facility: CLINIC | Age: 78
End: 2023-09-22
Payer: MEDICARE

## 2023-09-22 DIAGNOSIS — M25.532 BILATERAL WRIST PAIN: ICD-10-CM

## 2023-09-22 DIAGNOSIS — I48.0 PAROXYSMAL A-FIB: Primary | ICD-10-CM

## 2023-09-22 DIAGNOSIS — M25.531 BILATERAL WRIST PAIN: ICD-10-CM

## 2023-09-22 LAB
ALBUMIN SERPL-MCNC: 4.2 G/DL (ref 3.5–5.2)
ALBUMIN/GLOB SERPL: 1.8 G/DL
ALP SERPL-CCNC: 69 U/L (ref 39–117)
ALT SERPL W P-5'-P-CCNC: 11 U/L (ref 1–33)
ANION GAP SERPL CALCULATED.3IONS-SCNC: 8 MMOL/L (ref 5–15)
AST SERPL-CCNC: 22 U/L (ref 1–32)
BASOPHILS # BLD AUTO: 0.04 10*3/MM3 (ref 0–0.2)
BASOPHILS NFR BLD AUTO: 0.7 % (ref 0–1.5)
BILIRUB SERPL-MCNC: 0.4 MG/DL (ref 0–1.2)
BUN SERPL-MCNC: 16 MG/DL (ref 8–23)
BUN/CREAT SERPL: 16.8 (ref 7–25)
CALCIUM SPEC-SCNC: 9.6 MG/DL (ref 8.6–10.5)
CHLORIDE SERPL-SCNC: 107 MMOL/L (ref 98–107)
CHOLEST SERPL-MCNC: 194 MG/DL (ref 0–200)
CO2 SERPL-SCNC: 26 MMOL/L (ref 22–29)
CREAT SERPL-MCNC: 0.95 MG/DL (ref 0.57–1)
DEPRECATED RDW RBC AUTO: 40.2 FL (ref 37–54)
EGFRCR SERPLBLD CKD-EPI 2021: 61.5 ML/MIN/1.73
EOSINOPHIL # BLD AUTO: 0.22 10*3/MM3 (ref 0–0.4)
EOSINOPHIL NFR BLD AUTO: 4.1 % (ref 0.3–6.2)
ERYTHROCYTE [DISTWIDTH] IN BLOOD BY AUTOMATED COUNT: 11.8 % (ref 12.3–15.4)
GLOBULIN UR ELPH-MCNC: 2.3 GM/DL
GLUCOSE SERPL-MCNC: 83 MG/DL (ref 65–99)
HCT VFR BLD AUTO: 40.4 % (ref 34–46.6)
HDLC SERPL-MCNC: 61 MG/DL (ref 40–60)
HGB BLD-MCNC: 13.3 G/DL (ref 12–15.9)
IMM GRANULOCYTES # BLD AUTO: 0.01 10*3/MM3 (ref 0–0.05)
IMM GRANULOCYTES NFR BLD AUTO: 0.2 % (ref 0–0.5)
LDLC SERPL CALC-MCNC: 120 MG/DL (ref 0–100)
LDLC/HDLC SERPL: 1.94 {RATIO}
LYMPHOCYTES # BLD AUTO: 1.16 10*3/MM3 (ref 0.7–3.1)
LYMPHOCYTES NFR BLD AUTO: 21.4 % (ref 19.6–45.3)
MCH RBC QN AUTO: 30.6 PG (ref 26.6–33)
MCHC RBC AUTO-ENTMCNC: 32.9 G/DL (ref 31.5–35.7)
MCV RBC AUTO: 93.1 FL (ref 79–97)
MONOCYTES # BLD AUTO: 0.41 10*3/MM3 (ref 0.1–0.9)
MONOCYTES NFR BLD AUTO: 7.6 % (ref 5–12)
NEUTROPHILS NFR BLD AUTO: 3.57 10*3/MM3 (ref 1.7–7)
NEUTROPHILS NFR BLD AUTO: 66 % (ref 42.7–76)
NRBC BLD AUTO-RTO: 0 /100 WBC (ref 0–0.2)
PLATELET # BLD AUTO: 241 10*3/MM3 (ref 140–450)
PMV BLD AUTO: 10.9 FL (ref 6–12)
POTASSIUM SERPL-SCNC: 4.8 MMOL/L (ref 3.5–5.2)
PROT SERPL-MCNC: 6.5 G/DL (ref 6–8.5)
RBC # BLD AUTO: 4.34 10*6/MM3 (ref 3.77–5.28)
SODIUM SERPL-SCNC: 141 MMOL/L (ref 136–145)
T4 FREE SERPL-MCNC: 1.47 NG/DL (ref 0.93–1.7)
TRIGL SERPL-MCNC: 72 MG/DL (ref 0–150)
TSH SERPL DL<=0.05 MIU/L-ACNC: 1.53 UIU/ML (ref 0.27–4.2)
VLDLC SERPL-MCNC: 13 MG/DL (ref 5–40)
WBC NRBC COR # BLD: 5.41 10*3/MM3 (ref 3.4–10.8)

## 2023-09-22 PROCEDURE — 80053 COMPREHEN METABOLIC PANEL: CPT | Performed by: FAMILY MEDICINE

## 2023-09-22 PROCEDURE — 84439 ASSAY OF FREE THYROXINE: CPT | Performed by: FAMILY MEDICINE

## 2023-09-22 PROCEDURE — 85025 COMPLETE CBC W/AUTO DIFF WBC: CPT | Performed by: FAMILY MEDICINE

## 2023-09-22 PROCEDURE — 80061 LIPID PANEL: CPT | Performed by: FAMILY MEDICINE

## 2023-09-22 PROCEDURE — 84443 ASSAY THYROID STIM HORMONE: CPT | Performed by: FAMILY MEDICINE

## 2023-09-22 NOTE — PROGRESS NOTES
..  Venipuncture Blood Specimen Collection  Venipuncture performed in left arm by Shanelle Oseguera  with good hemostasis. Patient tolerated the procedure well without complications.   09/22/23   Shanelle Oseguera

## 2023-09-22 NOTE — PROGRESS NOTES
Venipuncture Blood Specimen Collection  Venipuncture performed in left arm by Miracle Larson with good hemostasis. Patient tolerated the procedure well without complications.   09/22/23   Miracle Larson

## 2023-10-12 ENCOUNTER — OFFICE VISIT (OUTPATIENT)
Dept: ORTHOPEDIC SURGERY | Facility: CLINIC | Age: 78
End: 2023-10-12
Payer: MEDICARE

## 2023-10-12 VITALS — HEIGHT: 61 IN | OXYGEN SATURATION: 97 % | HEART RATE: 79 BPM | WEIGHT: 150 LBS | BODY MASS INDEX: 28.32 KG/M2

## 2023-10-12 DIAGNOSIS — M25.531 BILATERAL WRIST PAIN: Primary | ICD-10-CM

## 2023-10-12 DIAGNOSIS — M18.0 OSTEOARTHRITIS OF CARPOMETACARPAL (CMC) JOINTS OF BOTH THUMBS, UNSPECIFIED OSTEOARTHRITIS TYPE: ICD-10-CM

## 2023-10-12 DIAGNOSIS — M25.532 BILATERAL WRIST PAIN: Primary | ICD-10-CM

## 2023-10-12 RX ORDER — LIDOCAINE HYDROCHLORIDE 10 MG/ML
1 INJECTION, SOLUTION INFILTRATION; PERINEURAL
Status: COMPLETED | OUTPATIENT
Start: 2023-10-12 | End: 2023-10-12

## 2023-10-12 RX ORDER — TRIAMCINOLONE ACETONIDE 40 MG/ML
40 INJECTION, SUSPENSION INTRA-ARTICULAR; INTRAMUSCULAR
Status: COMPLETED | OUTPATIENT
Start: 2023-10-12 | End: 2023-10-12

## 2023-10-12 RX ADMIN — LIDOCAINE HYDROCHLORIDE 1 ML: 10 INJECTION, SOLUTION INFILTRATION; PERINEURAL at 14:00

## 2023-10-12 RX ADMIN — TRIAMCINOLONE ACETONIDE 40 MG: 40 INJECTION, SUSPENSION INTRA-ARTICULAR; INTRAMUSCULAR at 14:00

## 2023-10-12 NOTE — PROGRESS NOTES
"Chief Complaint  Follow-up and Pain of the Right Wrist    Subjective          Pain        Noni Bhardwaj is a 78 y.o. female  presents to [facility] for     Patient presents for follow-up evaluation of right hand pain, right hand/thumb CMC arthritis she was last seen in March and had a left thumb injection she states her left thumb and hand and wrist are doing well she states her right thumb has been hurting her worse lately.      No Known Allergies     Social History     Socioeconomic History    Marital status:    Tobacco Use    Smoking status: Never     Passive exposure: Past    Smokeless tobacco: Never    Tobacco comments:     1/1/1900   Vaping Use    Vaping Use: Never used   Substance and Sexual Activity    Alcohol use: Yes     Alcohol/week: 2.0 standard drinks of alcohol     Types: 1 Shots of liquor, 1 Drinks containing 0.5 oz of alcohol per week     Comment: Once a month    Drug use: Never    Sexual activity: Not Currently     Partners: Male     Birth control/protection: Hysterectomy        REVIEW OF SYSTEMS    Constitutional: Awake alert and oriented x3, no acute distress, denies fevers, chills, weight loss  Respiratory: No respiratory distress  Vascular: Brisk cap refill, Intact distal pulses, No cyanosis, compartments soft with no signs or symptoms of compartment syndrome or DVT.   Cardiovascular: Denies chest pain, shortness of breath  Skin: Denies rashes, acute skin changes  Neurologic: Denies headache, loss of consciousness  MSK: right hand/thumb pain      Objective   Vital Signs:   Pulse 79   Ht 154.9 cm (61\")   Wt 68 kg (150 lb)   SpO2 97%   BMI 28.34 kg/mý     Body mass index is 28.34 kg/mý.    Physical Exam        right hand/thumb: Positive grind test of the thumb, tender to palpation of the thumb CMC joint, sensation intact to light touch median radial ulnar nerve, neurovascular intact      Small Joint Arthrocentesis: R thumb CMC  Consent given by: " patient  Site marked: site marked  Timeout: Immediately prior to procedure a time out was called to verify the correct patient, procedure, equipment, support staff and site/side marked as required   Supporting Documentation  Indications: pain   Procedure Details  Location: thumb - R thumb CMC  Needle gauge: 23G.  Medications administered: 1 mL lidocaine 1 %; 40 mg triamcinolone acetonide 40 MG/ML  Patient tolerance: patient tolerated the procedure well with no immediate complications        Imaging Results (Most Recent)       None             Result Review :   The following data was reviewed by: HAN Cotto on 10/12/2023:               Assessment and Plan    Diagnoses and all orders for this visit:    1. Bilateral wrist pain (Primary)    2. Osteoarthritis of carpometacarpal (CMC) joints of both thumbs, unspecified osteoarthritis type    Other orders  -     Cancel: Medium Joint Arthrocentesis  -     Small Joint Arthrocentesis: R thumb CMC        Discussed diagnosis and treatment options with the patient, she elected to have right thumb CMC an injection which she tolerated well, follow-up as needed per patient request    Call or return if worsening symptoms.    Follow Up   Return if symptoms worsen or fail to improve.  Patient was given instructions and counseling regarding her condition or for health maintenance advice. Please see specific information pulled into the AVS if appropriate.

## 2023-10-31 ENCOUNTER — TRANSCRIBE ORDERS (OUTPATIENT)
Dept: ADMINISTRATIVE | Facility: HOSPITAL | Age: 78
End: 2023-10-31
Payer: MEDICARE

## 2023-10-31 DIAGNOSIS — Z12.31 VISIT FOR SCREENING MAMMOGRAM: Primary | ICD-10-CM

## 2024-01-25 ENCOUNTER — HOSPITAL ENCOUNTER (OUTPATIENT)
Dept: MAMMOGRAPHY | Facility: HOSPITAL | Age: 79
Discharge: HOME | End: 2024-01-25
Admitting: FAMILY MEDICINE
Payer: MEDICARE

## 2024-01-25 DIAGNOSIS — Z12.31 VISIT FOR SCREENING MAMMOGRAM: ICD-10-CM

## 2024-01-25 PROCEDURE — 77063 BREAST TOMOSYNTHESIS BI: CPT

## 2024-01-25 PROCEDURE — 77067 SCR MAMMO BI INCL CAD: CPT

## 2024-02-05 DIAGNOSIS — E03.9 HYPOTHYROIDISM, UNSPECIFIED TYPE: ICD-10-CM

## 2024-02-05 RX ORDER — LEVOTHYROXINE SODIUM 0.07 MG/1
75 TABLET ORAL DAILY
Qty: 90 TABLET | Refills: 1 | OUTPATIENT
Start: 2024-02-05

## 2024-02-09 DIAGNOSIS — E03.9 HYPOTHYROIDISM, UNSPECIFIED TYPE: ICD-10-CM

## 2024-02-09 RX ORDER — LEVOTHYROXINE SODIUM 0.07 MG/1
75 TABLET ORAL DAILY
Qty: 90 TABLET | Refills: 1 | Status: SHIPPED | OUTPATIENT
Start: 2024-02-09

## 2024-02-09 NOTE — TELEPHONE ENCOUNTER
Caller: Noni Bhardwaj    Relationship: Self    Best call back number: [phone]     Requested Prescriptions:   Requested Prescriptions     Pending Prescriptions Disp Refills    levothyroxine (Synthroid) 75 MCG tablet 90 tablet 1     Sig: Take 1 tablet by mouth Daily.        Pharmacy where request should be sent: [address] - [phone] [facility] [phone] FX     Last office visit with prescribing clinician: 9/19/2023   Last telemedicine visit with prescribing clinician: Visit date not found   Next office visit with prescribing clinician: 2/20/2024     Additional details provided by patient: PATIENT IS REQUESTING A REFILL PRIOR TO HER UPCOMING APPOINTMENT ON 2/20/24    Does the patient have less than a 3 day supply:  [] Yes  [] No    Would you like a call back once the refill request has been completed: [] Yes [] No    If the office needs to give you a call back, can they leave a voicemail: [] Yes [] No    Lul Baum Rep   02/09/24 08:44 EST

## 2024-02-19 DIAGNOSIS — E78.2 MIXED HYPERLIPIDEMIA: ICD-10-CM

## 2024-02-19 DIAGNOSIS — E03.9 HYPOTHYROIDISM, UNSPECIFIED TYPE: ICD-10-CM

## 2024-02-19 DIAGNOSIS — D64.9 ANEMIA, UNSPECIFIED TYPE: ICD-10-CM

## 2024-02-19 DIAGNOSIS — E03.9 HYPOTHYROIDISM, UNSPECIFIED TYPE: Primary | ICD-10-CM

## 2024-02-19 RX ORDER — LEVOTHYROXINE SODIUM 0.07 MG/1
75 TABLET ORAL DAILY
Qty: 90 TABLET | Refills: 1 | Status: SHIPPED | OUTPATIENT
Start: 2024-02-19

## 2024-02-23 ENCOUNTER — READMISSION MANAGEMENT (OUTPATIENT)
Dept: CALL CENTER | Facility: HOSPITAL | Age: 79
End: 2024-02-23
Payer: MEDICARE

## 2024-02-23 NOTE — OUTREACH NOTE
Prep Survey      Flowsheet Row Responses   [facility] facility patient discharged from? Non-BH   Is LACE score < 7 ? Non-BH Discharge   Eligibility [facility]   Date of Admission 02/19/24   Date of Discharge 02/22/24   Discharge Disposition Home or Self Care   Discharge diagnosis Shortness of breath   Does the patient have one of the following disease processes/diagnoses(primary or secondary)? Other   Prep survey completed? Yes            Madiha ALY - Registered Nurse

## 2024-02-26 ENCOUNTER — TRANSITIONAL CARE MANAGEMENT TELEPHONE ENCOUNTER (OUTPATIENT)
Dept: CALL CENTER | Facility: HOSPITAL | Age: 79
End: 2024-02-26
Payer: MEDICARE

## 2024-02-26 NOTE — OUTREACH NOTE
Call Center TCM Note      Flowsheet Row Responses   [facility] patient discharged from? Non-BH  [[facility]]   Does the patient have one of the following disease processes/diagnoses(primary or secondary)? Other   TCM attempt successful? Yes  [verbal release ]   Call start time 1515   Call end time 1523   Discharge diagnosis Shortness of breath   Meds reviewed with patient/caregiver? Yes   Is the patient having any side effects they believe may be caused by any medication additions or changes? No   Does the patient have all medications ordered at discharge? Yes   Is the patient taking all medications as directed (includes completed medication regime)? Yes   Medication comments Taking Cipro as ordered   Comments Pt will call to schedule   Does the patient have an appointment with their PCP within 7-14 days of discharge? No   Nursing Interventions Routed TCM call to PCP office, Patient declined scheduling/rescheduling appointment at this time   Has home health visited the patient within 72 hours of discharge? N/A   Psychosocial issues? No   Did the patient receive a copy of their discharge instructions? Yes   Nursing interventions Reviewed instructions with patient   What is the patient's perception of their health status since discharge? Improving  [Reports she is better today but is still coughing, reports she was able to lay flat in bed last HS which is improvement for her. She reports pain w/inspiration but she relates to rib pain from excess coughing, no fever noted.]   Is the patient/caregiver able to teach back signs and symptoms related to disease process for when to call PCP? Yes   Is the patient/caregiver able to teach back signs and symptoms related to disease process for when to call 911? Yes   Additional teach back comments Encouraged f/u appt but declines, discussed concerns with pain with inspiration but pt reports she is better today, will monitor and will seek care prn.   TCM call completed?  Yes   Call end time 1522            Alysa Abbasi, MARISOL    2/26/2024, 15:23 EST

## 2024-02-27 ENCOUNTER — OFFICE VISIT (OUTPATIENT)
Dept: FAMILY MEDICINE CLINIC | Facility: CLINIC | Age: 79
End: 2024-02-27
Payer: MEDICARE

## 2024-02-27 ENCOUNTER — TELEPHONE (OUTPATIENT)
Dept: FAMILY MEDICINE CLINIC | Facility: CLINIC | Age: 79
End: 2024-02-27
Payer: MEDICARE

## 2024-02-27 ENCOUNTER — CLINICAL SUPPORT (OUTPATIENT)
Dept: FAMILY MEDICINE CLINIC | Facility: CLINIC | Age: 79
End: 2024-02-27
Payer: MEDICARE

## 2024-02-27 VITALS
BODY MASS INDEX: 28.81 KG/M2 | DIASTOLIC BLOOD PRESSURE: 76 MMHG | WEIGHT: 152.5 LBS | OXYGEN SATURATION: 99 % | SYSTOLIC BLOOD PRESSURE: 124 MMHG | HEART RATE: 74 BPM

## 2024-02-27 DIAGNOSIS — M25.531 BILATERAL WRIST PAIN: Primary | ICD-10-CM

## 2024-02-27 DIAGNOSIS — M25.532 BILATERAL WRIST PAIN: Primary | ICD-10-CM

## 2024-02-27 DIAGNOSIS — Z47.89 AFTERCARE FOLLOWING SURGERY OF THE MUSCULOSKELETAL SYSTEM: ICD-10-CM

## 2024-02-27 DIAGNOSIS — R06.02 SHORTNESS OF BREATH: Primary | ICD-10-CM

## 2024-02-27 DIAGNOSIS — M18.0 OSTEOARTHRITIS OF CARPOMETACARPAL (CMC) JOINTS OF BOTH THUMBS, UNSPECIFIED OSTEOARTHRITIS TYPE: ICD-10-CM

## 2024-02-27 DIAGNOSIS — M65.4 TENDINITIS, DE QUERVAIN'S: ICD-10-CM

## 2024-02-27 DIAGNOSIS — I48.0 PAROXYSMAL A-FIB: ICD-10-CM

## 2024-02-27 DIAGNOSIS — N30.00 ACUTE CYSTITIS WITHOUT HEMATURIA: Primary | ICD-10-CM

## 2024-02-27 LAB
ALBUMIN SERPL-MCNC: 4 G/DL (ref 3.5–5.2)
ALBUMIN/GLOB SERPL: 1.4 G/DL
ALP SERPL-CCNC: 70 U/L (ref 39–117)
ALT SERPL W P-5'-P-CCNC: 15 U/L (ref 1–33)
ANION GAP SERPL CALCULATED.3IONS-SCNC: 10.9 MMOL/L (ref 5–15)
AST SERPL-CCNC: 25 U/L (ref 1–32)
BASOPHILS # BLD AUTO: 0.05 10*3/MM3 (ref 0–0.2)
BASOPHILS NFR BLD AUTO: 0.8 % (ref 0–1.5)
BILIRUB SERPL-MCNC: 0.3 MG/DL (ref 0–1.2)
BILIRUB UR QL STRIP: NEGATIVE
BUN SERPL-MCNC: 18 MG/DL (ref 8–23)
BUN/CREAT SERPL: 20.7 (ref 7–25)
CALCIUM SPEC-SCNC: 9.6 MG/DL (ref 8.6–10.5)
CHLORIDE SERPL-SCNC: 104 MMOL/L (ref 98–107)
CHOLEST SERPL-MCNC: 155 MG/DL (ref 0–200)
CLARITY UR: CLEAR
CO2 SERPL-SCNC: 24.1 MMOL/L (ref 22–29)
COLOR UR: YELLOW
CREAT SERPL-MCNC: 0.87 MG/DL (ref 0.57–1)
DEPRECATED RDW RBC AUTO: 37.9 FL (ref 37–54)
EGFRCR SERPLBLD CKD-EPI 2021: 68.3 ML/MIN/1.73
EOSINOPHIL # BLD AUTO: 0.11 10*3/MM3 (ref 0–0.4)
EOSINOPHIL NFR BLD AUTO: 1.7 % (ref 0.3–6.2)
ERYTHROCYTE [DISTWIDTH] IN BLOOD BY AUTOMATED COUNT: 11.7 % (ref 12.3–15.4)
GLOBULIN UR ELPH-MCNC: 2.8 GM/DL
GLUCOSE SERPL-MCNC: 89 MG/DL (ref 65–99)
GLUCOSE UR STRIP-MCNC: NEGATIVE MG/DL
HCT VFR BLD AUTO: 34.7 % (ref 34–46.6)
HDLC SERPL-MCNC: 42 MG/DL (ref 40–60)
HGB BLD-MCNC: 11.3 G/DL (ref 12–15.9)
HGB UR QL STRIP.AUTO: NEGATIVE
IMM GRANULOCYTES # BLD AUTO: 0.03 10*3/MM3 (ref 0–0.05)
IMM GRANULOCYTES NFR BLD AUTO: 0.5 % (ref 0–0.5)
KETONES UR QL STRIP: NEGATIVE
LDLC SERPL CALC-MCNC: 100 MG/DL (ref 0–100)
LDLC/HDLC SERPL: 2.38 {RATIO}
LEUKOCYTE ESTERASE UR QL STRIP.AUTO: NEGATIVE
LYMPHOCYTES # BLD AUTO: 1.03 10*3/MM3 (ref 0.7–3.1)
LYMPHOCYTES NFR BLD AUTO: 15.5 % (ref 19.6–45.3)
MCH RBC QN AUTO: 29.4 PG (ref 26.6–33)
MCHC RBC AUTO-ENTMCNC: 32.6 G/DL (ref 31.5–35.7)
MCV RBC AUTO: 90.4 FL (ref 79–97)
MONOCYTES # BLD AUTO: 0.49 10*3/MM3 (ref 0.1–0.9)
MONOCYTES NFR BLD AUTO: 7.4 % (ref 5–12)
NEUTROPHILS NFR BLD AUTO: 4.92 10*3/MM3 (ref 1.7–7)
NEUTROPHILS NFR BLD AUTO: 74.1 % (ref 42.7–76)
NITRITE UR QL STRIP: NEGATIVE
NRBC BLD AUTO-RTO: 0 /100 WBC (ref 0–0.2)
PH UR STRIP.AUTO: 8 [PH] (ref 5–8)
PLATELET # BLD AUTO: 424 10*3/MM3 (ref 140–450)
PMV BLD AUTO: 10 FL (ref 6–12)
POTASSIUM SERPL-SCNC: 3.7 MMOL/L (ref 3.5–5.2)
PROT SERPL-MCNC: 6.8 G/DL (ref 6–8.5)
PROT UR QL STRIP: NEGATIVE
RBC # BLD AUTO: 3.84 10*6/MM3 (ref 3.77–5.28)
SODIUM SERPL-SCNC: 139 MMOL/L (ref 136–145)
SP GR UR STRIP: <=1.005 (ref 1–1.03)
T4 FREE SERPL-MCNC: 1.68 NG/DL (ref 0.93–1.7)
TRIGL SERPL-MCNC: 66 MG/DL (ref 0–150)
TSH SERPL DL<=0.05 MIU/L-ACNC: 1.59 UIU/ML (ref 0.27–4.2)
UROBILINOGEN UR QL STRIP: NORMAL
VLDLC SERPL-MCNC: 13 MG/DL (ref 5–40)
WBC NRBC COR # BLD AUTO: 6.63 10*3/MM3 (ref 3.4–10.8)

## 2024-02-27 PROCEDURE — 99213 OFFICE O/P EST LOW 20 MIN: CPT | Performed by: FAMILY MEDICINE

## 2024-02-27 PROCEDURE — 87086 URINE CULTURE/COLONY COUNT: CPT | Performed by: FAMILY MEDICINE

## 2024-02-27 PROCEDURE — 80053 COMPREHEN METABOLIC PANEL: CPT | Performed by: FAMILY MEDICINE

## 2024-02-27 PROCEDURE — 84439 ASSAY OF FREE THYROXINE: CPT | Performed by: FAMILY MEDICINE

## 2024-02-27 PROCEDURE — 1159F MED LIST DOCD IN RCRD: CPT | Performed by: FAMILY MEDICINE

## 2024-02-27 PROCEDURE — 80061 LIPID PANEL: CPT | Performed by: FAMILY MEDICINE

## 2024-02-27 PROCEDURE — 1160F RVW MEDS BY RX/DR IN RCRD: CPT | Performed by: FAMILY MEDICINE

## 2024-02-27 PROCEDURE — 81003 URINALYSIS AUTO W/O SCOPE: CPT | Performed by: FAMILY MEDICINE

## 2024-02-27 PROCEDURE — 85025 COMPLETE CBC W/AUTO DIFF WBC: CPT | Performed by: FAMILY MEDICINE

## 2024-02-27 PROCEDURE — 84443 ASSAY THYROID STIM HORMONE: CPT | Performed by: FAMILY MEDICINE

## 2024-02-27 RX ORDER — CIPROFLOXACIN 500 MG/1
500 TABLET, FILM COATED ORAL 2 TIMES DAILY
COMMUNITY
Start: 2024-02-22 | End: 2024-02-29

## 2024-02-27 RX ORDER — ALBUTEROL SULFATE 90 UG/1
2 AEROSOL, METERED RESPIRATORY (INHALATION) EVERY 6 HOURS PRN
Qty: 18 G | Refills: 1 | Status: SHIPPED | OUTPATIENT
Start: 2024-02-27

## 2024-02-27 NOTE — PROGRESS NOTES
Venipuncture Blood Specimen Collection  Venipuncture performed in RT ARM by Amanda Oseguera with good hemostasis. Patient tolerated the procedure well without complications.   02/27/24   Amanda Oseguera

## 2024-02-27 NOTE — PROGRESS NOTES
Chief Complaint  Shortness of Breath (Fluid retention at hospital last week, given lasix at the time, kidney function caused d/c of lasix at home prior to)    Subjective          Noni Bhardwaj presents to [facility]  History of Present Illness  Pt had SOA over last couple weeks- had been in hospital for SOA- was worked up and found to have UTI- treated with cipro- almost done with antibiotic- reviewed records from hospitalization at [facility]  Pt still having intermittent SOA      BMI is >= 25 and <30. (Overweight) The following options were offered after discussion;: exercise counseling/recommendations and nutrition counseling/recommendations       Objective   No Known Allergies  Immunization History   Administered Date(s) Administered    COVID-19 (PFIZER) BIVALENT 12+YRS 10/03/2022    COVID-19 (PFIZER) Purple Cap Monovalent 02/11/2021, 03/04/2021, 10/05/2021    Fluad Quad 65+ 10/14/2021, 10/10/2022    Pneumococcal Conjugate 20-Valent (PCV20) 09/20/2022    Shingrix 10/20/2020     Past Medical History:   Diagnosis Date    A-fib     Cancer Basal cell? cheek    2022    Cataract     Coronary artery disease 2019    AFib    Disease of thyroid gland     Hypothyroidism     Stroke 2019    TIAs      Past Surgical History:   Procedure Laterality Date    APPENDECTOMY      BACK SURGERY      CARDIAC CATHETERIZATION  2019    Loop inserted    COLONOSCOPY      EYE SURGERY  2017    Sadi cataracts    HYSTERECTOMY      SPINE SURGERY  2013    Lumbar laminectomy    TONSILLECTOMY        Social History     Socioeconomic History    Marital status:    Tobacco Use    Smoking status: Never     Passive exposure: Past    Smokeless tobacco: Never    Tobacco comments:     1/1/1900   Vaping Use    Vaping Use: Never used   Substance and Sexual Activity    Alcohol use: Yes     Alcohol/week: 2.0 standard drinks of alcohol     Types: 1 Shots of liquor, 1 Drinks containing 0.5 oz of alcohol per week      Comment: Once a month    Drug use: Never    Sexual activity: Not Currently     Partners: Male     Birth control/protection: Hysterectomy        Current Outpatient Medications:     ciprofloxacin (CIPRO) 500 MG tablet, Take 1 tablet by mouth 2 (Two) Times a Day., Disp: , Rfl:     Eliquis 5 MG tablet tablet, Take 1 tablet by mouth 2 (Two) Times a Day., Disp: , Rfl:     levothyroxine (Synthroid) 75 MCG tablet, Take 1 tablet by mouth Daily., Disp: 90 tablet, Rfl: 1    metoprolol succinate XL (TOPROL-XL) 25 MG 24 hr tablet, Take 0.5 tablets by mouth Daily., Disp: , Rfl:     albuterol sulfate  (90 Base) MCG/ACT inhaler, Inhale 2 puffs Every 6 (Six) Hours As Needed for Wheezing or Shortness of Air., Disp: 18 g, Rfl: 1    dofetilide (TIKOSYN) 500 MCG capsule, Take 1 capsule by mouth 2 (Two) Times a Day., Disp: , Rfl:    Family History   Problem Relation Age of Onset    Heart disease Mother     Vision loss Mother         Maculardegeneration/legally blind    COPD Father     Heart disease Father     Mental illness Sister         bipolar    Colon cancer Neg Hx           Vital Signs:   Vitals:    02/27/24 1519   BP: 124/76   Pulse: 74   SpO2: 99%   Weight: 69.2 kg (152 lb 8 oz)       Review of Systems   Constitutional:  Negative for fatigue and fever.   HENT:  Negative for sore throat.    Eyes:  Negative for visual disturbance.   Respiratory:  Positive for shortness of breath and wheezing. Negative for cough and chest tightness.    Cardiovascular:  Negative for chest pain, palpitations and leg swelling.   Gastrointestinal:  Negative for abdominal pain, diarrhea, nausea and vomiting.   Neurological:  Negative for dizziness, syncope, light-headedness and headaches.      Physical Exam  Vitals reviewed.   Constitutional:       Appearance: Normal appearance. She is well-developed.   HENT:      Head: Normocephalic and atraumatic.      Right Ear: Tympanic membrane, ear canal and external ear normal.      Left Ear: Tympanic  membrane, ear canal and external ear normal.      Nose: Nose normal.      Mouth/Throat:      Mouth: Mucous membranes are moist.      Pharynx: Oropharynx is clear. No oropharyngeal exudate or posterior oropharyngeal erythema.   Eyes:      Conjunctiva/sclera: Conjunctivae normal.      Pupils: Pupils are equal, round, and reactive to light.   Cardiovascular:      Rate and Rhythm: Normal rate and regular rhythm.      Pulses: Normal pulses.      Heart sounds: Normal heart sounds. No murmur heard.     No friction rub. No gallop.   Pulmonary:      Effort: Pulmonary effort is normal.      Breath sounds: No wheezing or rhonchi.      Comments: Mild wheezing right lungs.  Abdominal:      General: Abdomen is flat. Bowel sounds are normal. There is no distension.      Palpations: Abdomen is soft. There is no mass.      Tenderness: There is no abdominal tenderness. There is no guarding or rebound.      Hernia: No hernia is present.   Musculoskeletal:         General: Normal range of motion.      Cervical back: Normal range of motion and neck supple.   Skin:     General: Skin is warm and dry.      Capillary Refill: Capillary refill takes less than 2 seconds.   Neurological:      General: No focal deficit present.      Mental Status: She is alert and oriented to person, place, and time.      Cranial Nerves: No cranial nerve deficit.   Psychiatric:         Mood and Affect: Mood and affect normal.         Behavior: Behavior normal.         Thought Content: Thought content normal.         Judgment: Judgment normal.        Result Review :   The following data was reviewed by: Chi Jasso MD on 02/27/2024:  CMP          9/22/2023    08:59   CMP   Glucose 83    BUN 16    Creatinine 0.95    EGFR 61.5    Sodium 141    Potassium 4.8    Chloride 107    Calcium 9.6    Total Protein 6.5    Albumin 4.2    Globulin 2.3    Total Bilirubin 0.4    Alkaline Phosphatase 69    AST (SGOT) 22    ALT (SGPT) 11    Albumin/Globulin Ratio 1.8     BUN/Creatinine Ratio 16.8    Anion Gap 8.0      CBC          2/19/2024    12:19 2/20/2024    02:24 2/22/2024    01:53   CBC   WBC 6.97     7.40     12.96       RBC 4.00     3.67     3.98       Hemoglobin 11.8     11.0     11.8       Hematocrit 36.5     33.1     35.8       MCV 91.3     90.2     89.9       MCH 29.5     30.0     29.6       MCHC 32.3     33.2     33.0       RDW 12.3     12.5     12.8       Platelets 256     244     326          Details          This result is from an external source.             Lipid Panel          9/22/2023    08:59   Lipid Panel   Total Cholesterol 194    Triglycerides 72    HDL Cholesterol 61    VLDL Cholesterol 13    LDL Cholesterol  120    LDL/HDL Ratio 1.94      TSH          9/22/2023    08:59   TSH   TSH 1.530      Data reviewed : Radiologic studies I viewed and interpreted 2 views chest x-rays:NAD.           Assessment and Plan    Diagnoses and all orders for this visit:    1. Shortness of breath (Primary)  -     XR Chest PA & Lateral (In Office)  -     albuterol sulfate  (90 Base) MCG/ACT inhaler; Inhale 2 puffs Every 6 (Six) Hours As Needed for Wheezing or Shortness of Air.  Dispense: 18 g; Refill: 1            Follow Up   Return in about 1 week (around 3/5/2024) for Recheck.  Patient was given instructions and counseling regarding her condition or for health maintenance advice. Please see specific information pulled into the AVS if appropriate.

## 2024-02-27 NOTE — TELEPHONE ENCOUNTER
Please place order for urine with culture if indicated. They also wanted to get any other labs you may want if possible, but are okay with waiting until Friday if needed on the actual blood work if any is due.

## 2024-02-27 NOTE — PROGRESS NOTES
Call pt:  chest x-ray showed no acute disease.  I sent albuterol inhaler to start using up to 4 times daily with SOA.  Follow-up in office next week, sooner if symptoms worsen.

## 2024-02-28 LAB — BACTERIA SPEC AEROBE CULT: NO GROWTH

## 2024-03-04 ENCOUNTER — OFFICE VISIT (OUTPATIENT)
Dept: FAMILY MEDICINE CLINIC | Facility: CLINIC | Age: 79
End: 2024-03-04
Payer: MEDICARE

## 2024-03-04 VITALS
HEART RATE: 72 BPM | WEIGHT: 152.2 LBS | BODY MASS INDEX: 28.74 KG/M2 | HEIGHT: 61 IN | TEMPERATURE: 97.3 F | OXYGEN SATURATION: 99 % | SYSTOLIC BLOOD PRESSURE: 120 MMHG | DIASTOLIC BLOOD PRESSURE: 64 MMHG

## 2024-03-04 DIAGNOSIS — D64.9 ANEMIA, UNSPECIFIED TYPE: ICD-10-CM

## 2024-03-04 DIAGNOSIS — Z00.00 MEDICARE ANNUAL WELLNESS VISIT, SUBSEQUENT: Primary | ICD-10-CM

## 2024-03-04 DIAGNOSIS — R53.83 FATIGUE, UNSPECIFIED TYPE: ICD-10-CM

## 2024-03-04 LAB
BASOPHILS # BLD AUTO: 0.04 10*3/MM3 (ref 0–0.2)
BASOPHILS NFR BLD AUTO: 0.5 % (ref 0–1.5)
DEPRECATED RDW RBC AUTO: 39.7 FL (ref 37–54)
EOSINOPHIL # BLD AUTO: 0.15 10*3/MM3 (ref 0–0.4)
EOSINOPHIL NFR BLD AUTO: 2 % (ref 0.3–6.2)
ERYTHROCYTE [DISTWIDTH] IN BLOOD BY AUTOMATED COUNT: 12.1 % (ref 12.3–15.4)
FOLATE SERPL-MCNC: >20 NG/ML (ref 4.78–24.2)
HCT VFR BLD AUTO: 35.1 % (ref 34–46.6)
HGB BLD-MCNC: 11.4 G/DL (ref 12–15.9)
IMM GRANULOCYTES # BLD AUTO: 0.03 10*3/MM3 (ref 0–0.05)
IMM GRANULOCYTES NFR BLD AUTO: 0.4 % (ref 0–0.5)
IRON 24H UR-MRATE: 37 MCG/DL (ref 37–145)
IRON SATN MFR SERPL: 11 % (ref 20–50)
LYMPHOCYTES # BLD AUTO: 1.61 10*3/MM3 (ref 0.7–3.1)
LYMPHOCYTES NFR BLD AUTO: 21.6 % (ref 19.6–45.3)
MCH RBC QN AUTO: 29.6 PG (ref 26.6–33)
MCHC RBC AUTO-ENTMCNC: 32.5 G/DL (ref 31.5–35.7)
MCV RBC AUTO: 91.2 FL (ref 79–97)
MONOCYTES # BLD AUTO: 0.49 10*3/MM3 (ref 0.1–0.9)
MONOCYTES NFR BLD AUTO: 6.6 % (ref 5–12)
NEUTROPHILS NFR BLD AUTO: 5.14 10*3/MM3 (ref 1.7–7)
NEUTROPHILS NFR BLD AUTO: 68.9 % (ref 42.7–76)
NRBC BLD AUTO-RTO: 0 /100 WBC (ref 0–0.2)
PLATELET # BLD AUTO: 424 10*3/MM3 (ref 140–450)
PMV BLD AUTO: 10.3 FL (ref 6–12)
RBC # BLD AUTO: 3.85 10*6/MM3 (ref 3.77–5.28)
TIBC SERPL-MCNC: 325 MCG/DL (ref 298–536)
TRANSFERRIN SERPL-MCNC: 218 MG/DL (ref 200–360)
VIT B12 BLD-MCNC: 1217 PG/ML (ref 211–946)
WBC NRBC COR # BLD AUTO: 7.46 10*3/MM3 (ref 3.4–10.8)

## 2024-03-04 PROCEDURE — 82607 VITAMIN B-12: CPT | Performed by: FAMILY MEDICINE

## 2024-03-04 PROCEDURE — 84466 ASSAY OF TRANSFERRIN: CPT | Performed by: FAMILY MEDICINE

## 2024-03-04 PROCEDURE — 82746 ASSAY OF FOLIC ACID SERUM: CPT | Performed by: FAMILY MEDICINE

## 2024-03-04 PROCEDURE — 85025 COMPLETE CBC W/AUTO DIFF WBC: CPT | Performed by: FAMILY MEDICINE

## 2024-03-04 PROCEDURE — 83540 ASSAY OF IRON: CPT | Performed by: FAMILY MEDICINE

## 2024-03-04 PROCEDURE — 86803 HEPATITIS C AB TEST: CPT | Performed by: FAMILY MEDICINE

## 2024-03-04 NOTE — PROGRESS NOTES
Venipuncture Blood Specimen Collection  Venipuncture performed in left arm  by Amanda Oseguera with good hemostasis. Patient tolerated the procedure well without complications.   03/04/24   Amanda Oseguera

## 2024-03-04 NOTE — PROGRESS NOTES
The ABCs of the Annual Wellness Visit  Subsequent Medicare Wellness Visit    Subjective    Noni Bhardwaj is a 78 y.o. female who presents for a Subsequent Medicare Wellness Visit.    The following portions of the patient's history were reviewed and   updated as appropriate: She  has a past medical history of A-fib, Cancer (Basal cell? cheek), Cataract, Coronary artery disease (2019), Disease of thyroid gland, Hypothyroidism, and Stroke (2019).  She does not have any pertinent problems on file.  She  has a past surgical history that includes Appendectomy; Back surgery; Colonoscopy; Hysterectomy; Cardiac catheterization (2019); Eye surgery (2017); Spine surgery (2013); Tonsillectomy; and Cardiac Ablation (02/13/2024).  Her family history includes COPD in her father; Heart disease in her father and mother; Mental illness in her sister; No Known Problems in her son; Vision loss in her mother.  She  reports that she has never smoked. She has been exposed to tobacco smoke. She has never used smokeless tobacco. She reports current alcohol use of about 2.0 standard drinks of alcohol per week. She reports that she does not use drugs.  Current Outpatient Medications   Medication Sig Dispense Refill    albuterol sulfate  (90 Base) MCG/ACT inhaler Inhale 2 puffs Every 6 (Six) Hours As Needed for Wheezing or Shortness of Air. 18 g 1    dofetilide (TIKOSYN) 500 MCG capsule Take 1 capsule by mouth 2 (Two) Times a Day.      Eliquis 5 MG tablet tablet Take 1 tablet by mouth 2 (Two) Times a Day.      levothyroxine (Synthroid) 75 MCG tablet Take 1 tablet by mouth Daily. 90 tablet 1    metoprolol succinate XL (TOPROL-XL) 25 MG 24 hr tablet Take 0.5 tablets by mouth Daily.       No current facility-administered medications for this visit.     Current Outpatient Medications on File Prior to Visit   Medication Sig    albuterol sulfate  (90 Base) MCG/ACT inhaler Inhale 2 puffs Every 6 (Six) Hours As Needed for Wheezing or  Shortness of Air.    dofetilide (TIKOSYN) 500 MCG capsule Take 1 capsule by mouth 2 (Two) Times a Day.    Eliquis 5 MG tablet tablet Take 1 tablet by mouth 2 (Two) Times a Day.    levothyroxine (Synthroid) 75 MCG tablet Take 1 tablet by mouth Daily.    metoprolol succinate XL (TOPROL-XL) 25 MG 24 hr tablet Take 0.5 tablets by mouth Daily.     No current facility-administered medications on file prior to visit.     She has No Known Allergies..    Compared to one year ago, the patient feels her physical   health is worse.    Compared to one year ago, the patient feels her mental   health is the same.    Recent Hospitalizations:  She was admitted within the past 365 days at [facility].       Current Medical Providers:  Patient Care Team:  Chi Jasso MD as PCP - General (Family Medicine)  Roque Jeff MD as Consulting Physician (Gastroenterology)  Lorna Tong APRN as Nurse Practitioner (Gastroenterology)    Outpatient Medications Prior to Visit   Medication Sig Dispense Refill    albuterol sulfate  (90 Base) MCG/ACT inhaler Inhale 2 puffs Every 6 (Six) Hours As Needed for Wheezing or Shortness of Air. 18 g 1    dofetilide (TIKOSYN) 500 MCG capsule Take 1 capsule by mouth 2 (Two) Times a Day.      Eliquis 5 MG tablet tablet Take 1 tablet by mouth 2 (Two) Times a Day.      levothyroxine (Synthroid) 75 MCG tablet Take 1 tablet by mouth Daily. 90 tablet 1    metoprolol succinate XL (TOPROL-XL) 25 MG 24 hr tablet Take 0.5 tablets by mouth Daily.       No facility-administered medications prior to visit.       No opioid medication identified on active medication list. I have reviewed chart for other potential  high risk medication/s and harmful drug interactions in the elderly.        Aspirin is not on active medication list.  Aspirin use is not indicated based on review of current medical condition/s. Risk of harm outweighs potential benefits.  .    Patient Active Problem  "List   Diagnosis    Aftercare following surgery of right shoulder arthroscopic subacromial decompression, biceps tenotomy, mini open rotator cuff repair, 2/8/2021    Osteoarthritis of carpometacarpal (CMC) joints of both thumbs    Bilateral wrist pain    Tendinitis, de Quervain's    Osteoarthritis of right thumb    Paroxysmal A-fib     Advance Care Planning   Advance Care Planning     Advance Directive is not on file.  ACP discussion was held with the patient during this visit. Patient has an advance directive (not in EMR), copy requested.     Objective    Vitals:    03/04/24 1333   BP: 120/64   BP Location: Right arm   Patient Position: Sitting   Cuff Size: Adult   Pulse: 72   Temp: 97.3 °F (36.3 °C)   SpO2: 99%   Weight: 69 kg (152 lb 3.2 oz)   Height: 154.9 cm (61\")     Estimated body mass index is 28.76 kg/m² as calculated from the following:    Height as of this encounter: 154.9 cm (61\").    Weight as of this encounter: 69 kg (152 lb 3.2 oz).           Does the patient have evidence of cognitive impairment? No    Lab Results   Component Value Date    TRIG 66 02/27/2024    HDL 42 02/27/2024     02/27/2024    VLDL 13 02/27/2024        HEALTH RISK ASSESSMENT    Smoking Status:  Social History     Tobacco Use   Smoking Status Never    Passive exposure: Past   Smokeless Tobacco Never   Tobacco Comments    1/1/1900     Alcohol Consumption:  Social History     Substance and Sexual Activity   Alcohol Use Yes    Alcohol/week: 2.0 standard drinks of alcohol    Types: 1 Shots of liquor, 1 Drinks containing 0.5 oz of alcohol per week    Comment: Once a month     Fall Risk Screen:    HANK Fall Risk Assessment was completed, and patient is at MODERATE risk for falls. Assessment completed on:3/4/2024    Depression Screening:      3/4/2024     1:00 PM   PHQ-2/PHQ-9 Depression Screening   Little Interest or Pleasure in Doing Things 0-->not at all   Feeling Down, Depressed or Hopeless 0-->not at all   PHQ-9: Brief " Depression Severity Measure Score 0       Health Habits and Functional and Cognitive Screening:      3/4/2024     1:00 PM   Functional & Cognitive Status   Do you have difficulty preparing food and eating? No   Do you have difficulty bathing yourself, getting dressed or grooming yourself? No   Do you have difficulty using the toilet? No   Do you have difficulty moving around from place to place? No   Do you have trouble with steps or getting out of a bed or a chair? No   Current Diet Well Balanced Diet   Dental Exam Up to date   Eye Exam Up to date   Exercise (times per week) 2 times per week   Current Exercises Include Walking   Do you need help using the phone?  No   Are you deaf or do you have serious difficulty hearing?  No   Do you need help to go to places out of walking distance? No   Do you need help shopping? Yes   Do you need help preparing meals?  Yes   Do you need help with housework?  Yes   Do you need help with laundry? Yes   Do you need help taking your medications? No   Do you need help managing money? No   Do you ever drive or ride in a car without wearing a seat belt? No   Have you felt unusual stress, anger or loneliness in the last month? No   Who do you live with? Spouse   If you need help, do you have trouble finding someone available to you? No   Have you been bothered in the last four weeks by sexual problems? No   Do you have difficulty concentrating, remembering or making decisions? No       Age-appropriate Screening Schedule:  Refer to the list below for future screening recommendations based on patient's age, sex and/or medical conditions. Orders for these recommended tests are listed in the plan section. The patient has been provided with a written plan.    Health Maintenance   Topic Date Due    TDAP/TD VACCINES (1 - Tdap) Never done    RSV Vaccine - Adults (1 - 1-dose 60+ series) Never done    HEPATITIS C SCREENING  Never done    ZOSTER VACCINE (2 of 2) 12/15/2020    COVID-19 Vaccine (5  - 2023-24 season) 09/01/2023    LIPID PANEL  02/27/2025    BMI FOLLOWUP  02/27/2025    ANNUAL WELLNESS VISIT  03/04/2025    DXA SCAN  03/30/2025    INFLUENZA VACCINE  Completed    Pneumococcal Vaccine 65+  Completed                  CMS Preventative Services Quick Reference  Risk Factors Identified During Encounter  Immunizations Discussed/Encouraged: Tdap and Shingrix  Inactivity/Sedentary: Patient was advised to exercise at least 150 minutes a week per CDC recommendations.  The above risks/problems have been discussed with the patient.  Pertinent information has been shared with the patient in the After Visit Summary.  An After Visit Summary and PPPS were made available to the patient.    Follow Up:   Next Medicare Wellness visit to be scheduled in 1 year.       Additional E&M Note during same encounter follows:  Patient has multiple medical problems which are significant and separately identifiable that require additional work above and beyond the Medicare Wellness Visit.      Chief Complaint  Medicare Wellness-subsequent, Hospital Follow Up Visit (2-19-24 Nayeli Shortness of breath), Hypothyroidism, and Hypertension    Subjective        Discussed labs  Anemia- need to work up  Pt says that albuterol has not helped her episodes of SOA- she talked to cardiology and they said that this was normal for her procedure and over time this will improve- pt sees cardiology again 3/26/24  I recommended pt get EKG- pt refuses at this time- she will let us know if and when she desires an ekg      Nonidebora Bhardwaj is also being seen today for anemia    Review of Systems   Constitutional:  Positive for fatigue. Negative for fever.   HENT:  Negative for sore throat.    Eyes:  Negative for visual disturbance.   Respiratory:  Positive for shortness of breath. Negative for cough, chest tightness and wheezing.    Cardiovascular:  Negative for chest pain, palpitations and leg swelling.   Gastrointestinal:  Negative for abdominal pain,  "diarrhea, nausea and vomiting.   Neurological:  Negative for light-headedness.   Psychiatric/Behavioral:  Negative for decreased concentration, dysphoric mood, sleep disturbance and suicidal ideas. The patient is not nervous/anxious.        Objective   Vital Signs:  /64 (BP Location: Right arm, Patient Position: Sitting, Cuff Size: Adult)   Pulse 72   Temp 97.3 °F (36.3 °C)   Ht 154.9 cm (61\")   Wt 69 kg (152 lb 3.2 oz)   SpO2 99%   BMI 28.76 kg/m²     Physical Exam  Vitals reviewed.   Constitutional:       Appearance: Normal appearance. She is well-developed.   HENT:      Head: Normocephalic and atraumatic.      Right Ear: External ear normal.      Left Ear: External ear normal.      Mouth/Throat:      Pharynx: No oropharyngeal exudate.   Eyes:      Conjunctiva/sclera: Conjunctivae normal.      Pupils: Pupils are equal, round, and reactive to light.   Cardiovascular:      Rate and Rhythm: Normal rate and regular rhythm.      Pulses: Normal pulses.      Heart sounds: Normal heart sounds. No murmur heard.     No friction rub. No gallop.   Pulmonary:      Effort: Pulmonary effort is normal.      Breath sounds: Normal breath sounds. No wheezing or rhonchi.   Abdominal:      General: Abdomen is flat. Bowel sounds are normal. There is no distension.      Palpations: Abdomen is soft. There is no mass.      Tenderness: There is no abdominal tenderness. There is no guarding or rebound.      Hernia: No hernia is present.   Musculoskeletal:         General: Normal range of motion.   Skin:     General: Skin is warm and dry.      Capillary Refill: Capillary refill takes less than 2 seconds.   Neurological:      General: No focal deficit present.      Mental Status: She is alert and oriented to person, place, and time.      Cranial Nerves: No cranial nerve deficit.   Psychiatric:         Mood and Affect: Mood and affect normal.         Behavior: Behavior normal.         Thought Content: Thought content normal.        "  Judgment: Judgment normal.          The following data was reviewed by: Chi Jasso MD on 03/04/2024:  CMP          9/22/2023    08:59 2/27/2024    14:57   CMP   Glucose 83  89    BUN 16  18    Creatinine 0.95  0.87    EGFR 61.5  68.3    Sodium 141  139    Potassium 4.8  3.7    Chloride 107  104    Calcium 9.6  9.6    Total Protein 6.5  6.8    Albumin 4.2  4.0    Globulin 2.3  2.8    Total Bilirubin 0.4  0.3    Alkaline Phosphatase 69  70    AST (SGOT) 22  25    ALT (SGPT) 11  15    Albumin/Globulin Ratio 1.8  1.4    BUN/Creatinine Ratio 16.8  20.7    Anion Gap 8.0  10.9      CBC          2/20/2024    02:24 2/22/2024    01:53 2/27/2024    14:57   CBC   WBC 7.40     12.96     6.63    RBC 3.67     3.98     3.84    Hemoglobin 11.0     11.8     11.3    Hematocrit 33.1     35.8     34.7    MCV 90.2     89.9     90.4    MCH 30.0     29.6     29.4    MCHC 33.2     33.0     32.6    RDW 12.5     12.8     11.7    Platelets 244     326     424       Details          This result is from an external source.             Lipid Panel          9/22/2023    08:59 2/27/2024    14:57   Lipid Panel   Total Cholesterol 194  155    Triglycerides 72  66    HDL Cholesterol 61  42    VLDL Cholesterol 13  13    LDL Cholesterol  120  100    LDL/HDL Ratio 1.94  2.38      TSH          9/22/2023    08:59 2/27/2024    14:57   TSH   TSH 1.530  1.590                 Assessment and Plan   Diagnoses and all orders for this visit:    1. Medicare annual wellness visit, subsequent (Primary)  -     Hepatitis C Antibody    2. Fatigue, unspecified type  -     CBC Auto Differential  -     Iron Profile  -     Vitamin B12 & Folate  -     Occult Blood X 1, Stool - Stool, Per Rectum    3. Anemia, unspecified type  -     CBC Auto Differential  -     Iron Profile  -     Vitamin B12 & Folate  -     Occult Blood X 1, Stool - Stool, Per Rectum             Follow Up   Return in about 1 month (around 4/4/2024) for Recheck.  Patient was given instructions and  counseling regarding her condition or for health maintenance advice. Please see specific information pulled into the AVS if appropriate.

## 2024-03-05 ENCOUNTER — CLINICAL SUPPORT (OUTPATIENT)
Dept: FAMILY MEDICINE CLINIC | Facility: CLINIC | Age: 79
End: 2024-03-05
Payer: MEDICARE

## 2024-03-05 DIAGNOSIS — I48.0 PAROXYSMAL A-FIB: ICD-10-CM

## 2024-03-05 DIAGNOSIS — D50.9 IRON DEFICIENCY ANEMIA, UNSPECIFIED IRON DEFICIENCY ANEMIA TYPE: Primary | ICD-10-CM

## 2024-03-05 LAB
HCV AB SER DONR QL: NORMAL
HEMOCCULT STL QL IA: NEGATIVE

## 2024-03-05 PROCEDURE — 82274 ASSAY TEST FOR BLOOD FECAL: CPT | Performed by: FAMILY MEDICINE

## 2024-03-05 RX ORDER — FERROUS SULFATE 325(65) MG
325 TABLET ORAL
Qty: 30 TABLET | Refills: 1 | Status: SHIPPED | OUTPATIENT
Start: 2024-03-05

## 2024-03-20 ENCOUNTER — OFFICE VISIT (OUTPATIENT)
Dept: FAMILY MEDICINE CLINIC | Facility: CLINIC | Age: 79
End: 2024-03-20
Payer: MEDICARE

## 2024-03-20 ENCOUNTER — TRANSCRIBE ORDERS (OUTPATIENT)
Dept: FAMILY MEDICINE CLINIC | Facility: CLINIC | Age: 79
End: 2024-03-20
Payer: MEDICARE

## 2024-03-20 VITALS
TEMPERATURE: 97.3 F | BODY MASS INDEX: 28.38 KG/M2 | DIASTOLIC BLOOD PRESSURE: 72 MMHG | OXYGEN SATURATION: 96 % | HEART RATE: 76 BPM | WEIGHT: 150.2 LBS | SYSTOLIC BLOOD PRESSURE: 140 MMHG

## 2024-03-20 DIAGNOSIS — R06.9 UNSPECIFIED ABNORMALITIES OF BREATHING: ICD-10-CM

## 2024-03-20 DIAGNOSIS — R55 NEAR SYNCOPE: ICD-10-CM

## 2024-03-20 DIAGNOSIS — Z63.6 CAREGIVER BURDEN: ICD-10-CM

## 2024-03-20 DIAGNOSIS — I48.0 PAROXYSMAL A-FIB: ICD-10-CM

## 2024-03-20 DIAGNOSIS — R42 DIZZINESS: ICD-10-CM

## 2024-03-20 DIAGNOSIS — Z78.9 NEED FOR COMMUNITY RESOURCE: ICD-10-CM

## 2024-03-20 DIAGNOSIS — I48.0 PAROXYSMAL A-FIB: Primary | ICD-10-CM

## 2024-03-20 DIAGNOSIS — Z76.89 ENCOUNTER FOR ASSESSMENT OF ATRIAL FIBRILLATION: Primary | ICD-10-CM

## 2024-03-20 DIAGNOSIS — R53.1 WEAKNESS: ICD-10-CM

## 2024-03-20 LAB
ANION GAP SERPL CALCULATED.3IONS-SCNC: 12.7 MMOL/L (ref 5–15)
BASOPHILS # BLD AUTO: 0.03 10*3/MM3 (ref 0–0.2)
BASOPHILS NFR BLD AUTO: 0.4 % (ref 0–1.5)
BUN SERPL-MCNC: 17 MG/DL (ref 8–23)
BUN/CREAT SERPL: 17.9 (ref 7–25)
CALCIUM SPEC-SCNC: 9.9 MG/DL (ref 8.6–10.5)
CHLORIDE SERPL-SCNC: 103 MMOL/L (ref 98–107)
CO2 SERPL-SCNC: 23.3 MMOL/L (ref 22–29)
CREAT SERPL-MCNC: 0.95 MG/DL (ref 0.57–1)
DEPRECATED RDW RBC AUTO: 40.1 FL (ref 37–54)
EGFRCR SERPLBLD CKD-EPI 2021: 61.1 ML/MIN/1.73
EOSINOPHIL # BLD AUTO: 0.14 10*3/MM3 (ref 0–0.4)
EOSINOPHIL NFR BLD AUTO: 2 % (ref 0.3–6.2)
ERYTHROCYTE [DISTWIDTH] IN BLOOD BY AUTOMATED COUNT: 12.2 % (ref 12.3–15.4)
GLUCOSE SERPL-MCNC: 96 MG/DL (ref 65–99)
HCT VFR BLD AUTO: 38.6 % (ref 34–46.6)
HGB BLD-MCNC: 12.2 G/DL (ref 12–15.9)
IMM GRANULOCYTES # BLD AUTO: 0.02 10*3/MM3 (ref 0–0.05)
IMM GRANULOCYTES NFR BLD AUTO: 0.3 % (ref 0–0.5)
LYMPHOCYTES # BLD AUTO: 0.95 10*3/MM3 (ref 0.7–3.1)
LYMPHOCYTES NFR BLD AUTO: 13.8 % (ref 19.6–45.3)
MCH RBC QN AUTO: 28.2 PG (ref 26.6–33)
MCHC RBC AUTO-ENTMCNC: 31.6 G/DL (ref 31.5–35.7)
MCV RBC AUTO: 89.4 FL (ref 79–97)
MONOCYTES # BLD AUTO: 0.36 10*3/MM3 (ref 0.1–0.9)
MONOCYTES NFR BLD AUTO: 5.2 % (ref 5–12)
NEUTROPHILS NFR BLD AUTO: 5.36 10*3/MM3 (ref 1.7–7)
NEUTROPHILS NFR BLD AUTO: 78.3 % (ref 42.7–76)
NRBC BLD AUTO-RTO: 0 /100 WBC (ref 0–0.2)
NT-PROBNP SERPL-MCNC: 541 PG/ML (ref 0–1800)
PLATELET # BLD AUTO: 270 10*3/MM3 (ref 140–450)
PMV BLD AUTO: 10.8 FL (ref 6–12)
POTASSIUM SERPL-SCNC: 3.9 MMOL/L (ref 3.5–5.2)
RBC # BLD AUTO: 4.32 10*6/MM3 (ref 3.77–5.28)
SODIUM SERPL-SCNC: 139 MMOL/L (ref 136–145)
WBC NRBC COR # BLD AUTO: 6.86 10*3/MM3 (ref 3.4–10.8)

## 2024-03-20 PROCEDURE — 80048 BASIC METABOLIC PNL TOTAL CA: CPT | Performed by: PHYSICIAN ASSISTANT

## 2024-03-20 PROCEDURE — 83880 ASSAY OF NATRIURETIC PEPTIDE: CPT | Performed by: PHYSICIAN ASSISTANT

## 2024-03-20 PROCEDURE — 85025 COMPLETE CBC W/AUTO DIFF WBC: CPT | Performed by: PHYSICIAN ASSISTANT

## 2024-03-20 SDOH — SOCIAL STABILITY - SOCIAL INSECURITY: DEPENDENT RELATIVE NEEDING CARE AT HOME: Z63.6

## 2024-03-20 NOTE — PROGRESS NOTES
Venipuncture Blood Specimen Collection  Venipuncture performed in LEFT ARM  by Amanda Oseguera with good hemostasis. Patient tolerated the procedure well without complications.   03/20/24   Amanda Oseguera

## 2024-03-20 NOTE — PROGRESS NOTES
Chief Complaint  Labs Only (Needs labs and EKG for cardiologist. )    History of Present Illness  Noni Bhardwaj is a 79 y.o. female who presents to [facility] with a past medical history of    Past Medical History:   Diagnosis Date    A-fib     Cancer Basal cell? cheek    2022    Cataract     Coronary artery disease 2019    AFib    Disease of thyroid gland     Hypothyroidism     Stroke 2019    TIAs     She is needing outside labs and EKG for cardiology -     She had a cardiac ablation 02/13 - at [facility]. She had complications after - hard to breathe due to retained fluid. Ended up being hospitalized for 4 days - had iv diuresis and that affected her kidneys so they stopped it. She did have a cough with it - but that has resolved. She was also very dizzy/lightheaded and was having issues with that. Most of her symptoms have improved.     TCM visit with Dr. Jasso on 2/27/2024.  A follow-up chest x-ray was performed which showed no acute cardiopulmonary process.  She then had a follow-up appointment with him on 3/4/2024 for Medicare annual wellness exam.  At that time she was still experiencing some fatigue, and anemia was noted on CBC.  He did do iron studies which showed decreased iron saturation.  Normal B12 and folate.  Occult blood in the stool x 1 was negative.  She has been taking an iron supplement for the past 2 weeks.    She continues to endorse weakness and fatigue - her  has Alzheimer's.  She is his primary caregiver, although she does have someone come and sit with him so that she can go to appointments.  This person will also help bathe him.  Her regular person recently had knee replacement surgery, so her daughter (the sitter) is actually helping with his care right now.  [facility] does [facility].  Someone comes in once a week from the [employer] administration to help with this care.  She has not looked into any other forms of assistance  for him, but she is trying to keep him out of the nursing home.    She currently denies any chest pain or palpitations.  She wears an Apple Watch and it has advised her that she has spent approximately 2% of her time in atrial fibrillation over the past week.  Denies headaches.  No lower extremity edema.      Objective   Vital Signs:   Vitals:    03/20/24 0925   BP: 140/72   BP Location: Left arm   Patient Position: Sitting   Pulse: 76   Temp: 97.3 °F (36.3 °C)   TempSrc: Infrared   SpO2: 96%   Weight: 68.1 kg (150 lb 3.2 oz)     Body mass index is 28.38 kg/m².    Wt Readings from Last 3 Encounters:   03/20/24 68.1 kg (150 lb 3.2 oz)   03/04/24 69 kg (152 lb 3.2 oz)   02/27/24 69.2 kg (152 lb 8 oz)     BP Readings from Last 3 Encounters:   03/20/24 140/72   03/04/24 120/64   02/27/24 124/76       Health Maintenance   Topic Date Due    TDAP/TD VACCINES (1 - Tdap) Never done    RSV Vaccine - Adults (1 - 1-dose 60+ series) Never done    ZOSTER VACCINE (2 of 2) 12/15/2020    COVID-19 Vaccine (5 - 2023-24 season) 09/01/2023    LIPID PANEL  02/27/2025    BMI FOLLOWUP  02/27/2025    ANNUAL WELLNESS VISIT  03/04/2025    DXA SCAN  03/30/2025    HEPATITIS C SCREENING  Completed    INFLUENZA VACCINE  Completed    Pneumococcal Vaccine 65+  Completed       Physical Exam  Vitals reviewed.   Constitutional:       General: She is not in acute distress.     Appearance: She is well-developed and overweight.   HENT:      Head: Normocephalic and atraumatic.   Eyes:      General: No scleral icterus.     Extraocular Movements: Extraocular movements intact.      Conjunctiva/sclera: Conjunctivae normal.   Cardiovascular:      Rate and Rhythm: Normal rate and regular rhythm.      Pulses: Normal pulses.      Heart sounds: No murmur heard.     Comments: Regular rate and rhythm.  Occasional PVC/PAC noted on auscultation  Pulmonary:      Effort: Pulmonary effort is normal.      Breath sounds: Normal breath sounds. No wheezing, rhonchi or rales.    Musculoskeletal:         General: Normal range of motion.      Right lower leg: No edema.      Left lower leg: No edema.   Skin:     General: Skin is warm and dry.   Neurological:      Mental Status: She is alert and oriented to person, place, and time.   Psychiatric:         Mood and Affect: Mood and affect normal.         Behavior: Behavior normal.         Thought Content: Thought content normal.         Judgment: Judgment normal.           Result Review :  The following data was reviewed by: ISADORA Duarte on 03/20/2024:    Office Visit on 03/04/2024   Component Date Value    WBC 03/04/2024 7.46     RBC 03/04/2024 3.85     Hemoglobin 03/04/2024 11.4 (L)     Hematocrit 03/04/2024 35.1     MCV 03/04/2024 91.2     MCH 03/04/2024 29.6     MCHC 03/04/2024 32.5     RDW 03/04/2024 12.1 (L)     RDW-SD 03/04/2024 39.7     MPV 03/04/2024 10.3     Platelets 03/04/2024 424     Neutrophil % 03/04/2024 68.9     Lymphocyte % 03/04/2024 21.6     Monocyte % 03/04/2024 6.6     Eosinophil % 03/04/2024 2.0     Basophil % 03/04/2024 0.5     Immature Grans % 03/04/2024 0.4     Neutrophils, Absolute 03/04/2024 5.14     Lymphocytes, Absolute 03/04/2024 1.61     Monocytes, Absolute 03/04/2024 0.49     Eosinophils, Absolute 03/04/2024 0.15     Basophils, Absolute 03/04/2024 0.04     Immature Grans, Absolute 03/04/2024 0.03     nRBC 03/04/2024 0.0     Iron 03/04/2024 37     Iron Saturation (TSAT) 03/04/2024 11 (L)     Transferrin 03/04/2024 218     TIBC 03/04/2024 325     Folate 03/04/2024 >20.00     Vitamin B-12 03/04/2024 1,217 (H)     Hepatitis C Ab 03/04/2024 Non-Reactive     Occult Blood, Fecal by I* 03/05/2024 Negative    Orders Only on 02/27/2024   Component Date Value    Color, UA 02/27/2024 Yellow     Appearance, UA 02/27/2024 Clear     pH, UA 02/27/2024 8.0     Specific Gravity, UA 02/27/2024 <=1.005     Glucose, UA 02/27/2024 Negative     Ketones, UA 02/27/2024 Negative     Bilirubin, UA 02/27/2024 Negative      Blood, UA 02/27/2024 Negative     Protein, UA 02/27/2024 Negative     Leuk Esterase, UA 02/27/2024 Negative     Nitrite, UA 02/27/2024 Negative     Urobilinogen, UA 02/27/2024 0.2 E.U./dL     Urine Culture 02/27/2024 No growth      Office Visit with Chi Jasso MD (02/27/2024)   Office Visit with Chi Jasso MD (03/04/2024)     XR Chest PA & Lateral (In Office)    Result Date: 2/27/2024    No acute cardiopulmonary process.     LYLE BAEZ MD       Electronically Signed and Approved By: LYLE BAEZ MD on 2/27/2024 at 16:39             Mammo Screening Digital Tomosynthesis Bilateral With CAD    Result Date: 1/25/2024  Benign mammogram. Suggest routine mammographic screening.  RECOMMENDATION(S):  ROUTINE MAMMOGRAM AND CLINICAL EVALUATION IN 12 MONTHS.   BIRADS:  DIAGNOSTIC CATEGORY 2--BENIGN FINDING   BREAST COMPOSITION: Scattered areas fibroglandular density.  PLEASE NOTE:  A NORMAL MAMMOGRAM DOES NOT EXCLUDE THE POSSIBILITY OF BREAST CANCER. ANY CLINICALLY SUSPICIOUS PALPABLE LUMP SHOULD BE BIOPSIED.      SHANNAN STAFFORD MD       Electronically Signed and Approved By: SHANNAN STAFFORD MD on 1/25/2024 at 18:53                 ECG 12 Lead    Date/Time: 3/20/2024 9:41 AM  Performed by: Lucy Wayne APRN    Authorized by: Lucy Wayne APRN  Comparison: compared with previous ECG from 10/27/2022  Rhythm: sinus rhythm  Rate: normal  BPM: 66  QRS axis: normal    Clinical impression: normal ECG        ECG 12 Lead (10/27/2022 10:48)        Assessment and Plan   Diagnoses and all orders for this visit:    1. Encounter for assessment of atrial fibrillation (Primary)  -     ECG 12 Lead    2. Weakness  -     ECG 12 Lead    3. Dizziness  -     ECG 12 Lead    4. Near syncope  -     ECG 12 Lead    5. Need for community resource  -     Ambulatory Referral to Social Care Services (Amb Case Mgmt)    6. Caregiver burden  -     Ambulatory Referral to Social Care Services (Amb Case Mgmt)                   FOLLOW UP  No follow-ups on file.    EKG obtained for cardiology.  We will fax to Viji Patterson PA-C per her request.  Outside labs will be obtained today as well to include CBC, BMP, BNP.  I will refer to social care services to see if there are any additional community resources available to her since she is caring for her  with Alzheimer's, but also has certain chronic health conditions that are physically demanding.    She will plan to schedule a follow-up appointment to establish care with me as she would like to change PCPs at this time.    Patient was given instructions and counseling regarding her condition or for health maintenance advice. Please see specific information pulled into the AVS if appropriate.       Lucy Wayne, APRN  03/20/24  09:49 EDT    CURRENT & DISCONTINUED MEDICATIONS  Current Outpatient Medications   Medication Instructions    dofetilide (TIKOSYN) 500 mcg, Oral, 2 Times Daily    Eliquis 5 mg, Oral, 2 Times Daily    ferrous sulfate 325 mg, Oral, Daily With Breakfast    levothyroxine (SYNTHROID) 75 mcg, Oral, Daily    metoprolol succinate XL (TOPROL-XL) 12.5 mg, Oral, Daily       Medications Discontinued During This Encounter   Medication Reason    albuterol sulfate  (90 Base) MCG/ACT inhaler *Therapy completed

## 2024-03-21 ENCOUNTER — REFERRAL TRIAGE (OUTPATIENT)
Age: 79
End: 2024-03-21
Payer: MEDICARE

## 2024-03-22 ENCOUNTER — PATIENT OUTREACH (OUTPATIENT)
Age: 79
End: 2024-03-22
Payer: MEDICARE

## 2024-03-22 NOTE — OUTREACH NOTE
UTRx1.SW attempted contact and pt was unable to speak today due to other responsibilities. SW discussed calling pt Monday, and pt was agreeable to this. . SW will attempt again on Monday.      Rebekah YODER -   Ambulatory Case Management    3/22/2024, 12:40 EDT

## 2024-03-25 ENCOUNTER — PATIENT OUTREACH (OUTPATIENT)
Age: 79
End: 2024-03-25
Payer: MEDICARE

## 2024-03-25 NOTE — OUTREACH NOTE
Social Work Assessment  Questions/Answers      Flowsheet Row Most Recent Value   People in Home spouse   Current Living Arrangements home   Potentially Unsafe Housing Conditions none   In the past 12 months has the electric, gas, oil, or water company threatened to shut off services in your home? No   Primary Care Provided by self   Provides Primary Care For spouse   Family Caregiver if Needed none   Quality of Family Relationships unable to assess   Source of Income [facility]   Application for Public Assistance obtained public assistance pending number        SDOH updated and reviewed with the patient during this program:  --     Alcohol Use: Not At Risk (8/18/2021)    Received from [facility]    AUDIT-C     Frequency of Alcohol Consumption: Never      --     Depression: Not at risk (3/4/2024)    PHQ-2     PHQ-2 Score: 0      --      Disabilities      --      Employment      Financial Resource Strain: Low Risk  (3/25/2024)    Overall Financial Resource Strain (CARDIA)     Difficulty of Paying Living Expenses: Not very hard      --     Food Insecurity: Unknown (3/25/2024)    Hunger Vital Sign     Ran Out of Food in the Last Year: Never true      --      Education      --     Housing Stability: Not At Risk (3/25/2024)    Housing Stability     Current Living Arrangements: home     Potentially Unsafe Housing Conditions: none      --     Interpersonal Safety: Not At Risk (3/25/2024)    Humiliation, Afraid, Rape, and Kick questionnaire     Fear of Current or Ex-Partner: No     Emotionally Abused: No     Physically Abused: No     Sexually Abused: No        --     Social Connections: Unknown (3/25/2024)    Social Connection and Isolation Panel [NHANES]     Marital Status:         --     Tobacco Use: Low Risk  (3/20/2024)    Patient History     Smoking Tobacco Use: Never     Smokeless Tobacco Use: Never     Passive Exposure: Past      --     Transportation Needs: No Transportation Needs  (3/25/2024)    PRAPARE - Transportation     Lack of Transportation (Medical): No     Lack of Transportation (Non-Medical): No      --     Utilities: Not At Risk (3/25/2024)    [facility] Utilities     Threatened with loss of utilities: No      Continuing Care   [facility]    [address] [place] [address]    Phone: [phone]    Resource for: Financial Resource Strain, Food Insecurity, Utilities   Patient Outreach    SW spoke with pt re the referral made for caregiving resources. Pt reported she uses a care giving agency six hours a week to help with her 's care, and then also has recently hired someone to help with his bathing tasks. Additionally, SW provided pt with contact information for [employer] and their ADRC to see if additional assistance was available. Pt expressed thanks for this. SW and pt discussed that she had spoken with pt PACT SW with the [facility] also. SW will D/c due to initial outreach goal being met.     Rebekah YODER -   Ambulatory Case Management    3/25/2024, 11:16 EDT

## 2024-04-17 ENCOUNTER — PATIENT MESSAGE (OUTPATIENT)
Dept: FAMILY MEDICINE CLINIC | Facility: CLINIC | Age: 79
End: 2024-04-17
Payer: MEDICARE

## 2024-04-17 DIAGNOSIS — R06.02 SHORTNESS OF BREATH: Primary | ICD-10-CM

## 2024-04-17 NOTE — TELEPHONE ENCOUNTER
From: Noni Bhardwaj  To: Lucy Wayne  Sent: 4/17/2024 12:12 PM EDT  Subject: Lung issue     GM Lucy, since my ablation, Feb 13 I have not had my normal lung capacity and have a residual cough. Lung technician in hospital suggested use of a spirometer. I just sent note to my cardiologist and her PA said that is a good idea but to have my PCP prescribe it. I have an appointment to see you next week but would like to begin using this sooner if possible. Thanks, Noni

## 2024-04-23 DIAGNOSIS — D50.9 IRON DEFICIENCY ANEMIA, UNSPECIFIED IRON DEFICIENCY ANEMIA TYPE: ICD-10-CM

## 2024-04-23 RX ORDER — PNV NO.95/FERROUS FUM/FOLIC AC 28MG-0.8MG
TABLET ORAL
Qty: 30 TABLET | Refills: 1 | Status: SHIPPED | OUTPATIENT
Start: 2024-04-23

## 2024-04-25 ENCOUNTER — OFFICE VISIT (OUTPATIENT)
Dept: FAMILY MEDICINE CLINIC | Facility: CLINIC | Age: 79
End: 2024-04-25
Payer: MEDICARE

## 2024-04-25 VITALS
SYSTOLIC BLOOD PRESSURE: 140 MMHG | OXYGEN SATURATION: 98 % | WEIGHT: 148 LBS | DIASTOLIC BLOOD PRESSURE: 90 MMHG | HEART RATE: 70 BPM | BODY MASS INDEX: 27.96 KG/M2

## 2024-04-25 DIAGNOSIS — Z86.79 HISTORY OF ATRIAL FIBRILLATION: ICD-10-CM

## 2024-04-25 DIAGNOSIS — J98.11 ATELECTASIS PULMONARY: ICD-10-CM

## 2024-04-25 DIAGNOSIS — Z98.890 HISTORY OF RADIOFREQUENCY ABLATION (RFA) PROCEDURE FOR CARDIAC ARRHYTHMIA: ICD-10-CM

## 2024-04-25 DIAGNOSIS — R06.02 SHORTNESS OF BREATH: ICD-10-CM

## 2024-04-25 DIAGNOSIS — M81.0 AGE-RELATED OSTEOPOROSIS WITHOUT CURRENT PATHOLOGICAL FRACTURE: Primary | ICD-10-CM

## 2024-04-25 LAB
25(OH)D3 SERPL-MCNC: 35.7 NG/ML (ref 30–100)
MAGNESIUM SERPL-MCNC: 2.1 MG/DL (ref 1.6–2.4)
PHOSPHATE SERPL-MCNC: 3.7 MG/DL (ref 2.5–4.5)

## 2024-04-25 PROCEDURE — 83735 ASSAY OF MAGNESIUM: CPT | Performed by: NURSE PRACTITIONER

## 2024-04-25 PROCEDURE — 84100 ASSAY OF PHOSPHORUS: CPT | Performed by: NURSE PRACTITIONER

## 2024-04-25 PROCEDURE — 82306 VITAMIN D 25 HYDROXY: CPT | Performed by: NURSE PRACTITIONER

## 2024-04-25 RX ORDER — ALENDRONATE SODIUM 70 MG/1
70 TABLET ORAL
Qty: 13 TABLET | Refills: 0 | Status: SHIPPED | OUTPATIENT
Start: 2024-04-25

## 2024-04-25 NOTE — PROGRESS NOTES
Chief Complaint  Osteoporosis (Discuss results of Dexa scan and starting medication ) and Follow-up (Recent heart ablation )    History of Present Illness  Noni Bhardwaj is a 79 y.o. female who presents to [facility] with a past medical history of    Past Medical History:   Diagnosis Date    A-fib     Cancer Basal cell? cheek    2022    Cataract     Coronary artery disease 2019    AFib    Disease of thyroid gland     Hypothyroidism     Stroke 2019    TIAs     Mrs. Bhardwaj presents to the office today to discuss recent cardiac ablation and DEXA scan - transferring PCP.     She had a DEXA in March 2023 - which showed osteoporosis - No previous comparison as last was done in [place]. Lumbar spine bone density was normal. Femoral neck showed osteoporosis - femoral neck mean was -2.4. Mean of total hip was -2.0. The estimated 10 year risk of a major osteoporotic fracture is calculated to be 29% and a hip fracture of 10%.     She states that she has already spoken with her dentist regarding treatment for osteoporosis and they did not feel that she had any contraindications.  She denies any dysphagia or odynophagia.  No history of PUD.  Currently denies heartburn or acid reflux.    She had cardiac ablation on February 13th. She states she is over all of the symptoms and she had not been in afib until yesterday, but states that yesterday was a horrendous day - her  has Alzheimer's and he had a bad day. Did not even make it to his appointment here yesterday.     She states she is easily winded. She does not feel that she has the lung capacity that she once had. She purchased an IS from [employer] and she can pull to 1500 but not further than that. She is not SOB with any activity, but more strenuous activity such as walking up hill. She does not have any issue with ADLs. No weakness. Not fatiguing easily. She does have a cough - which is not all the time, but if she takes a deep breath it  will make her cough. The cough is mostly dry, but sometimes she will cough up clear sputum. She bought the IS due to atelectasis on her CXR - she states it was discussed in the hospital and over a telehealth appt.     She does not see pulmonary - no history of smoking - the SOB and cough were not present until after the ablation per her report - she feels that her symptoms are related to either the ablation or the sedation administered for the ablation.     No unexpected weight loss, fever, chills, or body aches.     Objective   Vital Signs:   Vitals:    04/25/24 1123   BP: 140/90   Pulse: 70   SpO2: 98%   Weight: 67.1 kg (148 lb)     Body mass index is 27.96 kg/m².    Wt Readings from Last 3 Encounters:   04/25/24 67.1 kg (148 lb)   03/20/24 68.1 kg (150 lb 3.2 oz)   03/04/24 69 kg (152 lb 3.2 oz)     BP Readings from Last 3 Encounters:   04/25/24 140/90   03/20/24 140/72   03/04/24 120/64       Health Maintenance   Topic Date Due    TDAP/TD VACCINES (1 - Tdap) Never done    RSV Vaccine - Adults (1 - 1-dose 60+ series) Never done    ZOSTER VACCINE (2 of 2) 12/15/2020    COVID-19 Vaccine (5 - 2023-24 season) 09/01/2023    INFLUENZA VACCINE  08/01/2024    LIPID PANEL  02/27/2025    BMI FOLLOWUP  02/27/2025    ANNUAL WELLNESS VISIT  03/04/2025    DXA SCAN  03/30/2025    HEPATITIS C SCREENING  Completed    Pneumococcal Vaccine 65+  Completed       Physical Exam  Vitals reviewed.   Constitutional:       General: She is not in acute distress.     Appearance: She is well-developed and overweight.   HENT:      Head: Normocephalic and atraumatic.   Eyes:      General: No scleral icterus.        Right eye: No discharge.         Left eye: No discharge.      Extraocular Movements: Extraocular movements intact.      Conjunctiva/sclera: Conjunctivae normal.   Neck:      Trachea: Trachea normal.   Cardiovascular:      Rate and Rhythm: Normal rate and regular rhythm.      Pulses: Normal pulses.      Comments: Normal sinus rhythm  on exam today.  No ectopy  Pulmonary:      Effort: Pulmonary effort is normal.      Breath sounds: Normal breath sounds. No wheezing, rhonchi or rales.   Musculoskeletal:         General: Normal range of motion.      Cervical back: Normal range of motion and neck supple. No tenderness.      Right lower leg: No edema.      Left lower leg: No edema.   Lymphadenopathy:      Cervical: No cervical adenopathy.   Skin:     General: Skin is warm and dry.   Neurological:      Mental Status: She is alert and oriented to person, place, and time.   Psychiatric:         Mood and Affect: Mood and affect normal.         Behavior: Behavior normal.         Thought Content: Thought content normal.         Judgment: Judgment normal.         Result Review :  The following data was reviewed by: ISADORA Duarte on 04/25/2024:    NM Lung Ventilation & Perfusion (02/22/2024 08:36) - negative for PE  XR Chest 1 Vw (02/21/2024 08:34)   CT Angiogram Chest For PE (02/19/2024 15:12)     XR Chest PA & Lateral (In Office)    Result Date: 2/27/2024    No acute cardiopulmonary process.     LYLE BAEZ MD       Electronically Signed and Approved By: LYLE BAEZ MD on 2/27/2024 at 16:39             Mammo Screening Digital Tomosynthesis Bilateral With CAD    Result Date: 1/25/2024  Benign mammogram. Suggest routine mammographic screening.  RECOMMENDATION(S):  ROUTINE MAMMOGRAM AND CLINICAL EVALUATION IN 12 MONTHS.   BIRADS:  DIAGNOSTIC CATEGORY 2--BENIGN FINDING   BREAST COMPOSITION: Scattered areas fibroglandular density.  PLEASE NOTE:  A NORMAL MAMMOGRAM DOES NOT EXCLUDE THE POSSIBILITY OF BREAST CANCER. ANY CLINICALLY SUSPICIOUS PALPABLE LUMP SHOULD BE BIOPSIED.      SHANNAN STAFFORD MD       Electronically Signed and Approved By: SHANNAN STAFFORD MD on 1/25/2024 at 18:53                 Procedures        Assessment and Plan   Diagnoses and all orders for this visit:    1. Age-related osteoporosis without current pathological fracture  (Primary)  -     Magnesium  -     Phosphorus  -     Vitamin D,25-Hydroxy  -     alendronate (Fosamax) 70 MG tablet; Take 1 tablet by mouth Every 7 (Seven) Days.  Dispense: 13 tablet; Refill: 0    2. Shortness of breath    3. Atelectasis pulmonary    4. History of radiofrequency ablation (RFA) procedure for cardiac arrhythmia    5. History of atrial fibrillation                  FOLLOW UP  No follow-ups on file.    In regards to her DEXA from last year showing osteoporosis with elevated FRAX score, we did discuss administration of osteoporosis medication.  After discussion of different types of medication she is willing to try Fosamax once a week.  She will take this once weekly with a full glass of water remaining upright for at least 30 to 60 minutes after taking.  She will monitor for any side effects including difficulty swallowing or abdominal pain.  We did discuss rare side effects of osteonecrosis of the jaw and femur fracture.  Baseline magnesium, phosphorus, and vitamin D today.  She had recent BMP with normal calcium.  Encouraged weightbearing exercises in addition to daily calcium and vitamin D supplementation.  She should try to get 1200 mg of dietary/supplemental calcium daily and 2000 units of vitamin D3 daily.    In regards to her ongoing concerns for shortness of breath with history of atelectasis on pulmonary imaging, but no evidence for interstitial lung disease or pulmonary embolism, I have recommended that she reach out to cardiology to see if there are any reasons from a cardiology standpoint that she should still be experiencing any shortness of breath.  If she is reassured that from a cardiology standpoint there is no reason for her to be short of breath then she will contact me and I will place a referral to pulmonary to further assess.    Patient was given instructions and counseling regarding her condition or for health maintenance advice. Please see specific information pulled into the AVS  if appropriate.       Lucy Wayne, APRN  04/25/24  12:54 EDT    CURRENT & DISCONTINUED MEDICATIONS  Current Outpatient Medications   Medication Instructions    alendronate (FOSAMAX) 70 mg, Oral, Every 7 Days    dofetilide (TIKOSYN) 500 mcg, Oral, 2 Times Daily    Eliquis 5 mg, Oral, 2 Times Daily    ferrous sulfate 325 (65 Fe) MG tablet TAKE 1 TABLET BY MOUTH EVERY DAY with breakfastr    levothyroxine (SYNTHROID) 75 mcg, Oral, Daily    metoprolol succinate XL (TOPROL-XL) 12.5 mg, Oral, Daily       There are no discontinued medications.

## 2024-04-25 NOTE — PROGRESS NOTES
Venipuncture Blood Specimen Collection  Venipuncture performed in left arm  by Amanda Oseguera with good hemostasis. Patient tolerated the procedure well without complications.   04/25/24   Amanda Oseguera

## 2024-07-02 ENCOUNTER — OFFICE VISIT (OUTPATIENT)
Dept: ORTHOPEDIC SURGERY | Facility: CLINIC | Age: 79
End: 2024-07-02
Payer: MEDICARE

## 2024-07-02 VITALS
HEIGHT: 61 IN | HEART RATE: 65 BPM | SYSTOLIC BLOOD PRESSURE: 138 MMHG | DIASTOLIC BLOOD PRESSURE: 84 MMHG | WEIGHT: 143 LBS | BODY MASS INDEX: 27 KG/M2 | OXYGEN SATURATION: 96 %

## 2024-07-02 DIAGNOSIS — M18.0 OSTEOARTHRITIS OF CARPOMETACARPAL (CMC) JOINTS OF BOTH THUMBS, UNSPECIFIED OSTEOARTHRITIS TYPE: ICD-10-CM

## 2024-07-02 DIAGNOSIS — M79.642 LEFT HAND PAIN: Primary | ICD-10-CM

## 2024-07-02 RX ADMIN — LIDOCAINE HYDROCHLORIDE 1 ML: 10 INJECTION, SOLUTION INFILTRATION; PERINEURAL at 10:45

## 2024-07-02 RX ADMIN — TRIAMCINOLONE ACETONIDE 40 MG: 40 INJECTION, SUSPENSION INTRA-ARTICULAR; INTRAMUSCULAR at 10:45

## 2024-07-02 NOTE — PROGRESS NOTES
"Chief Complaint  Follow-up of the Left Wrist     Subjective      Noni Bhardwaj presents to [facility] for a follow up for her left wrist. She has pain to the base of her left thumb CMC. She has had prior steroid injections to the CMC joint with some relief. She has pain and decrease in range of motion. She reports she drops things due to her pain.      No Known Allergies     Social History     Socioeconomic History    Marital status:    Tobacco Use    Smoking status: Never     Passive exposure: Past    Smokeless tobacco: Never    Tobacco comments:     1/1/1900   Vaping Use    Vaping status: Never Used   Substance and Sexual Activity    Alcohol use: Yes     Alcohol/week: 2.0 standard drinks of alcohol     Types: 1 Shots of liquor, 1 Drinks containing 0.5 oz of alcohol per week     Comment: Once a month    Drug use: Never    Sexual activity: Not Currently     Partners: Male     Birth control/protection: Hysterectomy        I reviewed the patient's chief complaint, history of present illness, review of systems, past medical history, surgical history, family history, social history, medications, and allergy list.     Review of Systems     Constitutional: Denies fevers, chills, weight loss  Cardiovascular: Denies chest pain, shortness of breath  Skin: Denies rashes, acute skin changes  Neurologic: Denies headache, loss of consciousness  MSK: Left hand pain       Vital Signs:   /84   Pulse 65   Ht 154.9 cm (61\")   Wt 64.9 kg (143 lb)   SpO2 96%   BMI 27.02 kg/m²            Ortho Exam    Physical Exam  General:Alert. No acute distress   Left upper extremity: positive grind test. Swelling to the thumb CMC joint, no hyper extension of the thumb CMC joint, tender to the CMC joint, neurovascularly intact, sensation intact to the medial, radial and ulnar nerve      Small Joint Arthrocentesis: L thumb CMC  Consent given by: patient  Site marked: site marked  Timeout: Immediately " prior to procedure a time out was called to verify the correct patient, procedure, equipment, support staff and site/side marked as required   Supporting Documentation  Indications: pain   Procedure Details  Location: thumb - L thumb CMC  Needle gauge: 23g.  Medications administered: 1 mL lidocaine 1 %; 40 mg triamcinolone acetonide 40 MG/ML  Patient tolerance: patient tolerated the procedure well with no immediate complications    This injection documentation was Scribed for Linus Ngo MD by Nancy Brown MA.  07/02/24   11:21 EDT    X-Ray Report:  Left hand  X-Ray  Indication: Evaluation of left hand pain   AP/Lateral view(s)  Findings: advanced degenerative changes to the thumb CMC joint, no acute fracture.   Prior studies available for comparison: yes       Imaging Results (Most Recent)       Procedure Component Value Units Date/Time    XR Hand 2 View Left [[id number]] Resulted: 07/02/24 1045     Updated: 07/02/24 1046             Result Review :       No results found.           Assessment and Plan     Diagnoses and all orders for this visit:    1. Left hand pain (Primary)  -     XR Hand 2 View Left    2. Osteoarthritis of carpometacarpal (CMC) joints of left thumb, unspecified osteoarthritis type    The patient presents here today for a follow up for her left thumb. X-rays were obtained in the office today and these were reviewed today.     Discussed operative treatment options regarding a left thumb arthroplasty versus non operative treatment options versus injections, physical therapy and medications.     Patient wishes to proceed with non operative treatment.     Discussed the risks and benefits of a left thumb CMC injection today in the office. Patient expressed understanding and wishes to proceed. She tolerated the injection well and without any complications.     Call or return if worsening symptoms.    Follow Up     3 months     Patient was given instructions and counseling regarding her  condition or for health maintenance advice. Please see specific information pulled into the AVS if appropriate.     Scribed for Linus Ngo MD by Jojo Kong.  07/02/24   11:07 EDT    I have personally performed the services described in this document as scribed by the above individual and it is both accurate and complete. Linus Ngo MD 07/03/24

## 2024-07-03 RX ORDER — LIDOCAINE HYDROCHLORIDE 10 MG/ML
1 INJECTION, SOLUTION INFILTRATION; PERINEURAL
Status: COMPLETED | OUTPATIENT
Start: 2024-07-02 | End: 2024-07-02

## 2024-07-03 RX ORDER — TRIAMCINOLONE ACETONIDE 40 MG/ML
40 INJECTION, SUSPENSION INTRA-ARTICULAR; INTRAMUSCULAR
Status: COMPLETED | OUTPATIENT
Start: 2024-07-02 | End: 2024-07-02

## 2024-07-11 ENCOUNTER — TRANSCRIBE ORDERS (OUTPATIENT)
Dept: FAMILY MEDICINE CLINIC | Facility: CLINIC | Age: 79
End: 2024-07-11
Payer: MEDICARE

## 2024-07-11 ENCOUNTER — OFFICE VISIT (OUTPATIENT)
Dept: FAMILY MEDICINE CLINIC | Facility: CLINIC | Age: 79
End: 2024-07-11
Payer: MEDICARE

## 2024-07-11 VITALS
WEIGHT: 144.9 LBS | DIASTOLIC BLOOD PRESSURE: 78 MMHG | HEART RATE: 71 BPM | BODY MASS INDEX: 27.38 KG/M2 | OXYGEN SATURATION: 99 % | TEMPERATURE: 97.4 F | SYSTOLIC BLOOD PRESSURE: 130 MMHG

## 2024-07-11 DIAGNOSIS — M81.0 AGE-RELATED OSTEOPOROSIS WITHOUT CURRENT PATHOLOGICAL FRACTURE: ICD-10-CM

## 2024-07-11 DIAGNOSIS — I48.0 PAROXYSMAL ATRIAL FIBRILLATION: Primary | ICD-10-CM

## 2024-07-11 DIAGNOSIS — D50.9 IRON DEFICIENCY ANEMIA, UNSPECIFIED IRON DEFICIENCY ANEMIA TYPE: Primary | ICD-10-CM

## 2024-07-11 DIAGNOSIS — R15.9 FREQUENT FECAL INCONTINENCE: ICD-10-CM

## 2024-07-11 LAB
BASOPHILS # BLD AUTO: 0.04 10*3/MM3 (ref 0–0.2)
BASOPHILS NFR BLD AUTO: 0.4 % (ref 0–1.5)
DEPRECATED RDW RBC AUTO: 42.5 FL (ref 37–54)
EOSINOPHIL # BLD AUTO: 0.11 10*3/MM3 (ref 0–0.4)
EOSINOPHIL NFR BLD AUTO: 1.2 % (ref 0.3–6.2)
ERYTHROCYTE [DISTWIDTH] IN BLOOD BY AUTOMATED COUNT: 13 % (ref 12.3–15.4)
FERRITIN SERPL-MCNC: 269 NG/ML (ref 13–150)
HCT VFR BLD AUTO: 40.2 % (ref 34–46.6)
HGB BLD-MCNC: 13.5 G/DL (ref 12–15.9)
IMM GRANULOCYTES # BLD AUTO: 0.02 10*3/MM3 (ref 0–0.05)
IMM GRANULOCYTES NFR BLD AUTO: 0.2 % (ref 0–0.5)
IRON 24H UR-MRATE: 32 MCG/DL (ref 37–145)
IRON SATN MFR SERPL: 11 % (ref 20–50)
LYMPHOCYTES # BLD AUTO: 1.22 10*3/MM3 (ref 0.7–3.1)
LYMPHOCYTES NFR BLD AUTO: 13.2 % (ref 19.6–45.3)
MCH RBC QN AUTO: 30.2 PG (ref 26.6–33)
MCHC RBC AUTO-ENTMCNC: 33.6 G/DL (ref 31.5–35.7)
MCV RBC AUTO: 89.9 FL (ref 79–97)
MONOCYTES # BLD AUTO: 0.63 10*3/MM3 (ref 0.1–0.9)
MONOCYTES NFR BLD AUTO: 6.8 % (ref 5–12)
NEUTROPHILS NFR BLD AUTO: 7.22 10*3/MM3 (ref 1.7–7)
NEUTROPHILS NFR BLD AUTO: 78.2 % (ref 42.7–76)
NRBC BLD AUTO-RTO: 0 /100 WBC (ref 0–0.2)
PLATELET # BLD AUTO: 259 10*3/MM3 (ref 140–450)
PMV BLD AUTO: 10.7 FL (ref 6–12)
RBC # BLD AUTO: 4.47 10*6/MM3 (ref 3.77–5.28)
TIBC SERPL-MCNC: 301 MCG/DL (ref 298–536)
TRANSFERRIN SERPL-MCNC: 202 MG/DL (ref 200–360)
WBC NRBC COR # BLD AUTO: 9.24 10*3/MM3 (ref 3.4–10.8)

## 2024-07-11 PROCEDURE — 82728 ASSAY OF FERRITIN: CPT | Performed by: NURSE PRACTITIONER

## 2024-07-11 PROCEDURE — 1160F RVW MEDS BY RX/DR IN RCRD: CPT | Performed by: NURSE PRACTITIONER

## 2024-07-11 PROCEDURE — 1126F AMNT PAIN NOTED NONE PRSNT: CPT | Performed by: NURSE PRACTITIONER

## 2024-07-11 PROCEDURE — 83540 ASSAY OF IRON: CPT | Performed by: NURSE PRACTITIONER

## 2024-07-11 PROCEDURE — 36415 COLL VENOUS BLD VENIPUNCTURE: CPT | Performed by: NURSE PRACTITIONER

## 2024-07-11 PROCEDURE — 84466 ASSAY OF TRANSFERRIN: CPT | Performed by: NURSE PRACTITIONER

## 2024-07-11 PROCEDURE — 99214 OFFICE O/P EST MOD 30 MIN: CPT | Performed by: NURSE PRACTITIONER

## 2024-07-11 PROCEDURE — 1159F MED LIST DOCD IN RCRD: CPT | Performed by: NURSE PRACTITIONER

## 2024-07-11 PROCEDURE — 85025 COMPLETE CBC W/AUTO DIFF WBC: CPT | Performed by: NURSE PRACTITIONER

## 2024-07-11 RX ORDER — ALENDRONATE SODIUM 70 MG/1
TABLET ORAL
Qty: 13 TABLET | Refills: 0 | Status: SHIPPED | OUTPATIENT
Start: 2024-07-11

## 2024-07-11 NOTE — PROGRESS NOTES
Venipuncture Blood Specimen Collection  Venipuncture performed in LEFT ARM by Amanda Oseguera with good hemostasis. Patient tolerated the procedure well without complications.   07/11/24   Amanda Oseguera

## 2024-07-11 NOTE — PROGRESS NOTES
Chief Complaint  Anemia (Discuss Iron medication and if she needs to continue medication ) and Fecal Incontinence (Follow-up)    History of Present Illness  Noni Bhardwaj is a 79 y.o. female who presents to [facility] with a past medical history of  Past Medical History:   Diagnosis Date    A-fib     Cancer Basal cell? cheek    2022    Cataract     Coronary artery disease 2019    AFib    Disease of thyroid gland     Hypothyroidism     Stroke 2019    TIAs     She is here today to follow up on anemia - she is taking oral iron daily x 3 months - started per Dr. Jasso. Mild anemia on CBC following cardiac ablation - iron saturation 11% - ferritin not on file. Heme stool negative. She has not had any overt signs of blood loss. Noted to be fatigued following the ablation and that is why it was done. She reports that the fatigue has improved.     She states that she has been dealing with fecal incontinence for the past 15 years. If she has a bowel movement in the AM, then she is good for the rest of the day. But in order for her to have a bowel movement in the AM she has to get up 3 hours prior to doing anything to make sure she can have the bowel movement. She will not leave the house until she has had the bowel movement. She states if she goes out anywhere she cannot get to a bathroom quick enough to avoid incontinence. If the stool is soft, she will take 1/2 imodium. This does not cause constipation. She states constipation has never been a problem for her. She does not know if she has ever seen CR surgery - she did see GI in [facility] about a year ago. She states she prescribed a pill that looked like a suppository. She stopped it after 2 weeks d/t lack of efficacy (Colestipol).     Office Visit with Lorna Tong APRN (11/11/2022)     She has been to PT for pelvic floor therapy and that did not help. She has not had anorectal manometry to her knowledge. Last colonoscopy is unknown,  but she estimates around age 70. No recollection of polyps. No rectal bleeding or blood in the stool.     Objective   Vital Signs:   Vitals:    07/11/24 1117   BP: 130/78   Pulse: 71   Temp: 97.4 °F (36.3 °C)   SpO2: 99%   Weight: 65.7 kg (144 lb 14.4 oz)     Body mass index is 27.38 kg/m².    Wt Readings from Last 3 Encounters:   07/11/24 65.7 kg (144 lb 14.4 oz)   07/02/24 64.9 kg (143 lb)   04/25/24 67.1 kg (148 lb)     BP Readings from Last 3 Encounters:   07/11/24 130/78   07/02/24 138/84   04/25/24 140/90       Health Maintenance   Topic Date Due    TDAP/TD VACCINES (1 - Tdap) Never done    RSV Vaccine - Adults (1 - 1-dose 60+ series) Never done    ZOSTER VACCINE (2 of 2) 12/15/2020    COVID-19 Vaccine (5 - 2023-24 season) 09/01/2023    INFLUENZA VACCINE  08/01/2024    LIPID PANEL  02/27/2025    BMI FOLLOWUP  02/27/2025    ANNUAL WELLNESS VISIT  03/04/2025    DXA SCAN  03/30/2025    HEPATITIS C SCREENING  Completed    Pneumococcal Vaccine 65+  Completed       Physical Exam  Vitals reviewed.   Constitutional:       General: She is not in acute distress.     Appearance: She is well-developed and overweight.   HENT:      Head: Normocephalic and atraumatic.   Eyes:      General: No scleral icterus.     Extraocular Movements: Extraocular movements intact.      Conjunctiva/sclera: Conjunctivae normal.   Cardiovascular:      Rate and Rhythm: Normal rate and regular rhythm.      Pulses: Normal pulses.      Heart sounds: No murmur heard.     Comments: Sinus rhythm on exam today.  Pulmonary:      Effort: Pulmonary effort is normal.      Breath sounds: Normal breath sounds. No wheezing, rhonchi or rales.   Musculoskeletal:         General: Normal range of motion.      Cervical back: Normal range of motion.      Right lower leg: No edema.      Left lower leg: No edema.   Skin:     General: Skin is warm and dry.   Neurological:      Mental Status: She is alert and oriented to person, place, and time.   Psychiatric:          Mood and Affect: Mood and affect normal.         Behavior: Behavior normal.         Thought Content: Thought content normal.         Judgment: Judgment normal.           Result Review :  The following data was reviewed by: ISADORA Duarte on 07/11/2024:    No visits with results within 1 Month(s) from this visit.   Latest known visit with results is:   Office Visit on 04/25/2024   Component Date Value    Magnesium 04/25/2024 2.1     Phosphorus 04/25/2024 3.7     25 Hydroxy, Vitamin D 04/25/2024 35.7      Common labs          2/27/2024    14:57 3/4/2024    14:43 3/20/2024    10:24   Common Labs   Glucose 89   96    BUN 18   17    Creatinine 0.87   0.95    Sodium 139   139    Potassium 3.7   3.9    Chloride 104   103    Calcium 9.6   9.9    Albumin 4.0      Total Bilirubin 0.3      Alkaline Phosphatase 70      AST (SGOT) 25      ALT (SGPT) 15      WBC 6.63  7.46  6.86    Hemoglobin 11.3  11.4  12.2    Hematocrit 34.7  35.1  38.6    Platelets 424  424  270    Total Cholesterol 155      Triglycerides 66      HDL Cholesterol 42      LDL Cholesterol  100        Iron Profile (03/04/2024 14:43)       Procedures        Assessment and Plan   Diagnoses and all orders for this visit:    1. Iron deficiency anemia, unspecified iron deficiency anemia type (Primary)  -     CBC Auto Differential  -     Iron Profile  -     Ferritin    2. Frequent fecal incontinence  -     Ambulatory Referral to Colorectal Surgery  -     linaclotide (Linzess) 72 MCG capsule capsule; Take 1 capsule by mouth Every Morning Before Breakfast.  Dispense: 30 capsule; Refill: 0                  FOLLOW UP  Return if symptoms worsen or fail to improve.    We will repeat CBC with iron studies, adding a ferritin, to further assess her response to oral iron therapy.  She continues to deny any overt signs or symptoms of blood loss.  She did not have any issues with anemia until after having her cardiac ablation.  If persistently iron deficient despite  oral replacement then we will consider hematology referral.    In regards to her concerns regarding ongoing fecal incontinence, which has reportedly been present for the past 15 to 20 years, I am going to refer to colorectal surgery.  She states that she has previously tried and failed pelvic floor therapy.  She cites the reason for her incontinence is decrease in rectal tone and fecal urgency.  I am not certain if she would be a candidate for Solesta, but we did discuss the injection in the office today.  Information provided.  She does not have diarrhea per her report, nor does she have constipation.  She more or less wishes to be able to defecate first thing in the morning, or if that is not possible she feels that she needs improved rectal tone to prevent the incontinence secondary to the urgency.  I am going to give her a trial of Linzess to see if this will help initiate a bowel movement in the early morning, but patient is aware that this may not be efficacious whatsoever and could exacerbate the incontinence if giving her diarrhea.  We also discussed potentially taking milk of magnesia at bedtime to see if this would help initiate a bowel movement first thing in the morning.    Patient was given instructions and counseling regarding her condition or for health maintenance advice. Please see specific information pulled into the AVS if appropriate.       Lucy Wayne, APRN  07/11/24  12:24 EDT    CURRENT & DISCONTINUED MEDICATIONS  Current Outpatient Medications   Medication Instructions    alendronate (FOSAMAX) 70 mg, Oral, Every 7 Days    dofetilide (TIKOSYN) 500 mcg, Oral, 2 Times Daily    Eliquis 5 mg, Oral, 2 Times Daily    ferrous sulfate 325 (65 Fe) MG tablet TAKE 1 TABLET BY MOUTH EVERY DAY with breakfastr    levothyroxine (SYNTHROID) 75 mcg, Oral, Daily    linaclotide (LINZESS) 72 mcg, Oral, Every Morning Before Breakfast       Medications Discontinued During This Encounter   Medication Reason     metoprolol succinate XL (TOPROL-XL) 25 MG 24 hr tablet *Therapy completed

## 2024-07-28 ENCOUNTER — PATIENT MESSAGE (OUTPATIENT)
Dept: FAMILY MEDICINE CLINIC | Facility: CLINIC | Age: 79
End: 2024-07-28
Payer: MEDICARE

## 2024-07-29 NOTE — TELEPHONE ENCOUNTER
From: Noni Bhardwaj  To: Lucy Wayne  Sent: 7/28/2024 1:34 PM EDT  Subject: Unable to schedule appointments     Hi Lucy, I plan on following up with Hematology and Colorectal Dr in the future. My  has had several falls at the [facility] and I’ve been spending nights and a good part of the days with him. Just want you to know I’ll get these done as soon as possible. Noni

## 2024-08-05 DIAGNOSIS — D50.9 IRON DEFICIENCY ANEMIA, UNSPECIFIED IRON DEFICIENCY ANEMIA TYPE: ICD-10-CM

## 2024-08-05 RX ORDER — FERROUS SULFATE 325(65) MG
1 TABLET, DELAYED RELEASE (ENTERIC COATED) ORAL
Qty: 30 TABLET | Refills: 1 | Status: SHIPPED | OUTPATIENT
Start: 2024-08-05

## 2024-08-13 ENCOUNTER — OFFICE VISIT (OUTPATIENT)
Dept: FAMILY MEDICINE CLINIC | Facility: CLINIC | Age: 79
End: 2024-08-13
Payer: MEDICARE

## 2024-08-13 VITALS
BODY MASS INDEX: 27.19 KG/M2 | OXYGEN SATURATION: 98 % | TEMPERATURE: 97.5 F | HEIGHT: 61 IN | WEIGHT: 144 LBS | HEART RATE: 81 BPM

## 2024-08-13 DIAGNOSIS — M54.50 ACUTE BILATERAL LOW BACK PAIN WITHOUT SCIATICA: Primary | ICD-10-CM

## 2024-08-13 PROCEDURE — 1159F MED LIST DOCD IN RCRD: CPT

## 2024-08-13 PROCEDURE — 99213 OFFICE O/P EST LOW 20 MIN: CPT

## 2024-08-13 PROCEDURE — 1125F AMNT PAIN NOTED PAIN PRSNT: CPT

## 2024-08-13 PROCEDURE — 96372 THER/PROPH/DIAG INJ SC/IM: CPT

## 2024-08-13 PROCEDURE — 1160F RVW MEDS BY RX/DR IN RCRD: CPT

## 2024-08-13 RX ORDER — TRIAMCINOLONE ACETONIDE 40 MG/ML
40 INJECTION, SUSPENSION INTRA-ARTICULAR; INTRAMUSCULAR ONCE
Status: COMPLETED | OUTPATIENT
Start: 2024-08-13 | End: 2024-08-13

## 2024-08-13 RX ORDER — METHYLPREDNISOLONE 4 MG/1
TABLET ORAL
Qty: 1 EACH | Refills: 0 | Status: SHIPPED | OUTPATIENT
Start: 2024-08-13

## 2024-08-13 RX ADMIN — TRIAMCINOLONE ACETONIDE 40 MG: 40 INJECTION, SUSPENSION INTRA-ARTICULAR; INTRAMUSCULAR at 17:10

## 2024-08-13 NOTE — PROGRESS NOTES
"Chief Complaint  Back Pain (Below waist back pain been going on for over a month. She has been to [facility] and they gave her Toradol shot and muscle relaxer, the pain got a little better and then it got worse She went back to [facility], they gave her another shot, but it was not as strong because she is on blood thinner. They did give her more muscle relaxer ,PT and had Xray. She has been going to PT for 2 weeks. She is here for a possible MRI referral so she can see what is really wrong because it is not getting any better. )    PHQ-2 Total Score: 1   PHQ-9 Total Score: 1     History of Present Illness:  Noni Bhardwaj is a 79 y.o. female who presents to [facility] with a past medical history of  Past Medical History:   Diagnosis Date    A-fib     Cancer Basal cell? cheek    2022    Cataract     Coronary artery disease 2019    AFib    Disease of thyroid gland     Hypothyroidism     Stroke 2019    TIAs   Nnoi reports today with low back pain for some time.  She has been seen at local urgent care ([facility]) she reports the first time she went they gave her Toradol injection and a muscle relaxer and it got better.  She reports that it got worse as she went back to [facility] and they gave her another Toradol shot, but not muscle relaxers.  She reports they did an x-ray which showed no acute abnormalities and started her with physical therapy.  She reports she has been going to physical therapy for 2 weeks with no improvement.  She is requesting an MRI.  She also has a  who he is in long-term care and she does assist with his care at times.  Patient reports she is continuously having to help roll him over and he weighs 200+ pounds.  She denies any urinary symptoms.         Objective   Vital Signs:   Vitals:    08/13/24 1644   Pulse: 81   Temp: 97.5 °F (36.4 °C)   TempSrc: Temporal   SpO2: 98%   Weight: 65.3 kg (144 lb)   Height: 154.9 cm (61\")   PainSc:   8   PainLoc: " Back  Comment: Lower     Body mass index is 27.21 kg/m².    Wt Readings from Last 3 Encounters:   08/13/24 65.3 kg (144 lb)   07/11/24 65.7 kg (144 lb 14.4 oz)   07/02/24 64.9 kg (143 lb)     BP Readings from Last 3 Encounters:   07/11/24 130/78   07/02/24 138/84   04/25/24 140/90       Health Maintenance   Topic Date Due    TDAP/TD VACCINES (1 - Tdap) Never done    RSV Vaccine - Adults (1 - 1-dose 60+ series) Never done    ZOSTER VACCINE (2 of 2) 12/15/2020    COVID-19 Vaccine (5 - 2023-24 season) 09/01/2023    INFLUENZA VACCINE  08/01/2024    LIPID PANEL  02/27/2025    BMI FOLLOWUP  02/27/2025    ANNUAL WELLNESS VISIT  03/04/2025    DXA SCAN  03/30/2025    HEPATITIS C SCREENING  Completed    Pneumococcal Vaccine 65+  Completed       Review of Systems   Physical Exam  Vitals reviewed.   Constitutional:       Appearance: Normal appearance.   Eyes:      Pupils: Pupils are equal, round, and reactive to light.   Cardiovascular:      Rate and Rhythm: Normal rate and regular rhythm.   Pulmonary:      Effort: Pulmonary effort is normal.      Breath sounds: Normal breath sounds.   Musculoskeletal:      Lumbar back: Tenderness present.   Skin:     General: Skin is warm and dry.   Neurological:      General: No focal deficit present.      Mental Status: She is alert and oriented to person, place, and time.   Psychiatric:         Mood and Affect: Mood normal.            Result Review :  The following data was reviewed by: ISADORA Miramontes on 08/13/2024:  Called and requested results from [facility].  They are sending over.  Did note after speaking with staff, x-ray showed no acute abnormalities.    Common labs          3/4/2024    14:43 3/20/2024    10:24 7/11/2024    12:10   Common Labs   Glucose  96     BUN  17     Creatinine  0.95     Sodium  139     Potassium  3.9     Chloride  103     Calcium  9.9     WBC 7.46  6.86  9.24    Hemoglobin 11.4  12.2  13.5    Hematocrit 35.1  38.6  40.2    Platelets 424  270  259       Lipid Panel          9/22/2023    08:59 2/27/2024    14:57   Lipid Panel   Total Cholesterol 194  155    Triglycerides 72  66    HDL Cholesterol 61  42    VLDL Cholesterol 13  13    LDL Cholesterol  120  100    LDL/HDL Ratio 1.94  2.38      TSH          9/22/2023    08:59 2/27/2024    14:57   TSH   TSH 1.530  1.590             Procedures        Assessment and Plan   Diagnoses and all orders for this visit:    1. Acute bilateral low back pain without sciatica (Primary)  -     MRI Lumbar Spine Without Contrast  -     methylPREDNISolone (MEDROL) 4 MG dose pack; Take as directed on package instructions.  Dispense: 1 each; Refill: 0  -     triamcinolone acetonide (KENALOG-40) injection 40 mg        Since patient recently hide x-ray of lumbar area with no acute abnormalities, I did not feel it was appropriate to perform another x-ray since patient has not reinjured area.  Area just continues to decline.  Patient appropriately doing therapy with no improvement.  Will send over order for MRI of lumbar area.  Encourage patient to continue with physical therapy at this time.  Did send over Medrol Dosepak and clinic administered Kenalog injection to try to decrease any inflammation in the back.         FOLLOW UP  No follow-ups on file.    Patient was given instructions and counseling regarding her condition or for health maintenance advice. Please see specific information pulled into the AVS if appropriate.       ISADORA Miramontes  08/13/24  17:44 EDT    CURRENT & DISCONTINUED MEDICATIONS  Current Outpatient Medications   Medication Instructions    alendronate (FOSAMAX) 70 MG tablet TAKE 1 TABLET BY MOUTH EVERY 7 DAYS TAKE 1 TABLET BY MOUTH ONCE WEEKLY WITH 8 OUNCES WATER -30 MINUTES PRIOR TO FOOD OR MEDICATION, DO NOT LIE DOWN FOR 30 MINUTES AFTER TAKING    dofetilide (TIKOSYN) 500 mcg, Oral, 2 Times Daily    Eliquis 5 mg, Oral, 2 Times Daily    ferrous sulfate 325 mg, Oral, Daily With Breakfast    levothyroxine  (SYNTHROID) 75 mcg, Oral, Daily    linaclotide (LINZESS) 72 mcg, Oral, Every Morning Before Breakfast    methylPREDNISolone (MEDROL) 4 MG dose pack Take as directed on package instructions.       There are no discontinued medications.

## 2024-08-21 ENCOUNTER — TELEPHONE (OUTPATIENT)
Dept: FAMILY MEDICINE CLINIC | Facility: CLINIC | Age: 79
End: 2024-08-21

## 2024-08-22 ENCOUNTER — TELEPHONE (OUTPATIENT)
Dept: FAMILY MEDICINE CLINIC | Facility: CLINIC | Age: 79
End: 2024-08-22
Payer: MEDICARE

## 2024-08-22 NOTE — TELEPHONE ENCOUNTER
Pt called wanting results of MRI. Second call but results were not scanned in chart from [facility]

## 2024-09-25 ENCOUNTER — OFFICE VISIT (OUTPATIENT)
Dept: FAMILY MEDICINE CLINIC | Facility: CLINIC | Age: 79
End: 2024-09-25
Payer: MEDICARE

## 2024-09-25 VITALS
OXYGEN SATURATION: 96 % | HEIGHT: 61 IN | TEMPERATURE: 97.5 F | HEART RATE: 83 BPM | WEIGHT: 138 LBS | BODY MASS INDEX: 26.06 KG/M2 | DIASTOLIC BLOOD PRESSURE: 80 MMHG | SYSTOLIC BLOOD PRESSURE: 132 MMHG

## 2024-09-25 DIAGNOSIS — M54.42 CHRONIC BILATERAL LOW BACK PAIN WITH BILATERAL SCIATICA: Primary | ICD-10-CM

## 2024-09-25 DIAGNOSIS — M54.41 CHRONIC BILATERAL LOW BACK PAIN WITH BILATERAL SCIATICA: Primary | ICD-10-CM

## 2024-09-25 DIAGNOSIS — G89.29 CHRONIC BILATERAL LOW BACK PAIN WITH BILATERAL SCIATICA: Primary | ICD-10-CM

## 2024-09-25 DIAGNOSIS — Z01.89 NEED FOR PHYSICAL THERAPY ASSESSMENT: ICD-10-CM

## 2024-09-25 DIAGNOSIS — R03.0 ELEVATED BLOOD PRESSURE READING: ICD-10-CM

## 2024-09-25 PROCEDURE — 99213 OFFICE O/P EST LOW 20 MIN: CPT

## 2024-09-25 PROCEDURE — 1159F MED LIST DOCD IN RCRD: CPT

## 2024-09-25 PROCEDURE — 1160F RVW MEDS BY RX/DR IN RCRD: CPT

## 2024-09-25 PROCEDURE — 1125F AMNT PAIN NOTED PAIN PRSNT: CPT

## 2024-09-30 DIAGNOSIS — M81.0 AGE-RELATED OSTEOPOROSIS WITHOUT CURRENT PATHOLOGICAL FRACTURE: ICD-10-CM

## 2024-09-30 RX ORDER — ALENDRONATE SODIUM 70 MG/1
TABLET ORAL
Qty: 12 TABLET | Refills: 0 | Status: SHIPPED | OUTPATIENT
Start: 2024-09-30

## 2024-10-14 ENCOUNTER — OFFICE VISIT (OUTPATIENT)
Dept: FAMILY MEDICINE CLINIC | Facility: CLINIC | Age: 79
End: 2024-10-14
Payer: MEDICARE

## 2024-10-14 VITALS
WEIGHT: 137 LBS | BODY MASS INDEX: 25.86 KG/M2 | SYSTOLIC BLOOD PRESSURE: 144 MMHG | OXYGEN SATURATION: 100 % | TEMPERATURE: 97.1 F | DIASTOLIC BLOOD PRESSURE: 80 MMHG | HEIGHT: 61 IN | HEART RATE: 86 BPM

## 2024-10-14 DIAGNOSIS — M54.50 ACUTE BILATERAL LOW BACK PAIN WITHOUT SCIATICA: Primary | ICD-10-CM

## 2024-10-14 PROCEDURE — 1125F AMNT PAIN NOTED PAIN PRSNT: CPT

## 2024-10-14 PROCEDURE — 99213 OFFICE O/P EST LOW 20 MIN: CPT

## 2024-10-14 PROCEDURE — 96372 THER/PROPH/DIAG INJ SC/IM: CPT

## 2024-10-14 RX ORDER — METHYLPREDNISOLONE 4 MG
TABLET, DOSE PACK ORAL
Qty: 1 EACH | Refills: 0 | Status: SHIPPED | OUTPATIENT
Start: 2024-10-14

## 2024-10-14 RX ORDER — LIDOCAINE 50 MG/G
1 PATCH TOPICAL EVERY 24 HOURS
Qty: 30 EACH | Refills: 0 | Status: SHIPPED | OUTPATIENT
Start: 2024-10-14

## 2024-10-14 RX ORDER — TRIAMCINOLONE ACETONIDE 40 MG/ML
40 INJECTION, SUSPENSION INTRA-ARTICULAR; INTRAMUSCULAR ONCE
Status: COMPLETED | OUTPATIENT
Start: 2024-10-14 | End: 2024-10-14

## 2024-10-14 RX ADMIN — TRIAMCINOLONE ACETONIDE 40 MG: 40 INJECTION, SUSPENSION INTRA-ARTICULAR; INTRAMUSCULAR at 10:29

## 2024-10-14 NOTE — PROGRESS NOTES
"Chief Complaint  Back Pain (Wants a steroid injection and steroid pills for back pain)      History of Present Illness:  Noni Bhardwaj is a 79 y.o. female who presents to [facility] with a past medical history of  Past Medical History:   Diagnosis Date    A-fib     Arthritis     Cancer Basal cell? cheek    2022    Cataract     Coronary artery disease 2019    AFib    Disease of thyroid gland     Hypothyroidism     Osteopenia     Stroke 2019    TIAs   Noni presents today with continued complaints of back pain.  She is about to go out of town on a 6-hour plane ride.  She is requesting steroid injection and steroid pills to alleviate back pain.  She reports this was effective for her last time.  She reports it is her lower back and radiates to the right side.  She did not reinjure the area and has just flared up again.    Objective   Vital Signs:   Vitals:    10/14/24 0952   BP: 144/80   Pulse: 86   Temp: 97.1 °F (36.2 °C)   SpO2: 100%   Weight: 62.1 kg (137 lb)   Height: 154.9 cm (61\")     Body mass index is 25.89 kg/m².    Wt Readings from Last 3 Encounters:   10/14/24 62.1 kg (137 lb)   09/25/24 62.6 kg (138 lb)   08/13/24 65.3 kg (144 lb)     BP Readings from Last 3 Encounters:   10/14/24 144/80   09/25/24 132/80   07/11/24 130/78       Health Maintenance   Topic Date Due    TDAP/TD VACCINES (1 - Tdap) Never done    RSV Vaccine - Adults (1 - 1-dose 75+ series) Never done    ZOSTER VACCINE (2 of 2) 12/15/2020    COVID-19 Vaccine (6 - 2023-24 season) 01/20/2025    LIPID PANEL  02/27/2025    BMI FOLLOWUP  02/27/2025    ANNUAL WELLNESS VISIT  03/04/2025    DXA SCAN  03/30/2025    HEPATITIS C SCREENING  Completed    INFLUENZA VACCINE  Completed    Pneumococcal Vaccine 65+  Completed       Review of Systems   Physical Exam  Vitals reviewed.   Constitutional:       Appearance: Normal appearance.   Eyes:      Pupils: Pupils are equal, round, and reactive to light.   Cardiovascular:      " Rate and Rhythm: Normal rate and regular rhythm.   Pulmonary:      Effort: Pulmonary effort is normal.      Breath sounds: Normal breath sounds.   Musculoskeletal:      Lumbar back: Decreased range of motion.   Skin:     General: Skin is warm and dry.   Neurological:      General: No focal deficit present.      Mental Status: She is alert and oriented to person, place, and time.   Psychiatric:         Mood and Affect: Mood normal.            Result Review :  The following data was reviewed by: ISADORA Miramontes on 10/14/2024:  No visits with results within 1 Month(s) from this visit.   Latest known visit with results is:   Office Visit on 07/11/2024   Component Date Value    WBC 07/11/2024 9.24     RBC 07/11/2024 4.47     Hemoglobin 07/11/2024 13.5     Hematocrit 07/11/2024 40.2     MCV 07/11/2024 89.9     MCH 07/11/2024 30.2     MCHC 07/11/2024 33.6     RDW 07/11/2024 13.0     RDW-SD 07/11/2024 42.5     MPV 07/11/2024 10.7     Platelets 07/11/2024 259     Neutrophil % 07/11/2024 78.2 (H)     Lymphocyte % 07/11/2024 13.2 (L)     Monocyte % 07/11/2024 6.8     Eosinophil % 07/11/2024 1.2     Basophil % 07/11/2024 0.4     Immature Grans % 07/11/2024 0.2     Neutrophils, Absolute 07/11/2024 7.22 (H)     Lymphocytes, Absolute 07/11/2024 1.22     Monocytes, Absolute 07/11/2024 0.63     Eosinophils, Absolute 07/11/2024 0.11     Basophils, Absolute 07/11/2024 0.04     Immature Grans, Absolute 07/11/2024 0.02     nRBC 07/11/2024 0.0     Iron 07/11/2024 32 (L)     Iron Saturation (TSAT) 07/11/2024 11 (L)     Transferrin 07/11/2024 202     TIBC 07/11/2024 301     Ferritin 07/11/2024 269.00 (H)      09/12/2004 Christopher Perez-Neuro      Procedures        Assessment and Plan   Diagnoses and all orders for this visit:    1. Acute bilateral low back pain without sciatica (Primary)  -     triamcinolone acetonide (KENALOG-40) injection 40 mg  -     methylPREDNISolone (MEDROL) 4 MG dose pack; Take as directed on package  instructions.  Dispense: 1 each; Refill: 0  -     lidocaine (Lidoderm) 5 %; Place 1 patch on the skin as directed by provider Daily. Remove & Discard patch within 12 hours or as directed by MD  Dispense: 30 each; Refill: 0         FOLLOW UP  Return if symptoms worsen or fail to improve.    Will administer Kenalog injection and steroid Dosepak.  Will also send over Lidoderm patch to see if this helps alleviate some pain and discomfort.  Did discuss with her about following up with neurosurgery as well as possibility of referral to pain management for back injections.  She reports once she returns home from her trip, she will consider her options and let us know.    Patient was given instructions and counseling regarding her condition or for health maintenance advice. Please see specific information pulled into the AVS if appropriate.       ISADORA Miramontes  10/14/24  11:39 EDT    CURRENT & DISCONTINUED MEDICATIONS  Current Outpatient Medications   Medication Instructions    alendronate (FOSAMAX) 70 MG tablet TAKE 1 TABLET BY MOUTH ONCE WEEKLY WITH 8 OUNCES WATER -30 MINUTES PRIOR TO FOOD OR MEDICATION, DO NOT LIE DOWN FOR 30 MINUTES AFTER TAKING    dofetilide (TIKOSYN) 500 mcg, Oral, 2 Times Daily    Eliquis 5 mg, 2 Times Daily    levothyroxine (SYNTHROID) 75 mcg, Oral, Daily    lidocaine (Lidoderm) 5 % 1 patch, Transdermal, Every 24 Hours, Remove & Discard patch within 12 hours or as directed by MD    methylPREDNISolone (MEDROL) 4 MG dose pack Take as directed on package instructions.       Medications Discontinued During This Encounter   Medication Reason    ferrous sulfate 325 (65 FE) MG EC tablet *Therapy completed        EMR Dragon/Transcription disclaimer:  Parts of this encounter note are electronic transcription/translation of spoken language to printed text. The electronic translation of spoken language may permit erroneous, or at times, nonsensical words or phrases to be inadvertently transcribed.  Although I have reviewed the note for such errors, some may still exist.

## 2024-12-11 ENCOUNTER — TRANSCRIBE ORDERS (OUTPATIENT)
Dept: ADMINISTRATIVE | Facility: HOSPITAL | Age: 79
End: 2024-12-11
Payer: MEDICARE

## 2024-12-11 DIAGNOSIS — Z12.31 VISIT FOR SCREENING MAMMOGRAM: Primary | ICD-10-CM

## 2024-12-25 DIAGNOSIS — E03.9 HYPOTHYROIDISM, UNSPECIFIED TYPE: ICD-10-CM

## 2024-12-26 ENCOUNTER — CLINICAL SUPPORT (OUTPATIENT)
Dept: FAMILY MEDICINE CLINIC | Facility: CLINIC | Age: 79
End: 2024-12-26
Payer: MEDICARE

## 2024-12-26 DIAGNOSIS — I48.0 PAROXYSMAL ATRIAL FIBRILLATION: ICD-10-CM

## 2024-12-26 LAB
ANION GAP SERPL CALCULATED.3IONS-SCNC: 7.7 MMOL/L (ref 5–15)
BUN SERPL-MCNC: 16 MG/DL (ref 8–23)
BUN/CREAT SERPL: 17 (ref 7–25)
CALCIUM SPEC-SCNC: 9.7 MG/DL (ref 8.6–10.5)
CHLORIDE SERPL-SCNC: 105 MMOL/L (ref 98–107)
CO2 SERPL-SCNC: 26.3 MMOL/L (ref 22–29)
CREAT SERPL-MCNC: 0.94 MG/DL (ref 0.57–1)
EGFRCR SERPLBLD CKD-EPI 2021: 61.9 ML/MIN/1.73
GLUCOSE SERPL-MCNC: 84 MG/DL (ref 65–99)
POTASSIUM SERPL-SCNC: 4.4 MMOL/L (ref 3.5–5.2)
SODIUM SERPL-SCNC: 139 MMOL/L (ref 136–145)

## 2024-12-26 PROCEDURE — 80048 BASIC METABOLIC PNL TOTAL CA: CPT | Performed by: INTERNAL MEDICINE

## 2024-12-26 PROCEDURE — 36415 COLL VENOUS BLD VENIPUNCTURE: CPT | Performed by: NURSE PRACTITIONER

## 2024-12-26 RX ORDER — LEVOTHYROXINE SODIUM 75 UG/1
75 TABLET ORAL DAILY
Qty: 90 TABLET | Refills: 1 | Status: SHIPPED | OUTPATIENT
Start: 2024-12-26

## 2024-12-26 NOTE — PROGRESS NOTES
..  Venipuncture Blood Specimen Collection  Venipuncture performed in Lt arm by Diana Herman MA with good hemostasis. Patient tolerated the procedure well without complications.   12/26/24   Diana Herman MA

## 2025-01-03 DIAGNOSIS — M81.0 AGE-RELATED OSTEOPOROSIS WITHOUT CURRENT PATHOLOGICAL FRACTURE: ICD-10-CM

## 2025-01-03 RX ORDER — ALENDRONATE SODIUM 70 MG/1
TABLET ORAL
Qty: 12 TABLET | Refills: 0 | OUTPATIENT
Start: 2025-01-03

## 2025-01-03 RX ORDER — ALENDRONATE SODIUM 70 MG/1
TABLET ORAL
Qty: 4 TABLET | Refills: 0 | Status: SHIPPED | OUTPATIENT
Start: 2025-01-03

## 2025-01-03 NOTE — TELEPHONE ENCOUNTER
Caller: Noni Bhardwaj    Relationship to patient: Self    Best call back number: [phone]    Patient is needing: PATIENT CALLING TO CHECK ON THE STATUS OF HER REFILL REQUEST FOR FOSAMAX. PATIENT STATES SHE IS NEEDING THIS CALLED IN TODAY.

## 2025-01-15 ENCOUNTER — OFFICE VISIT (OUTPATIENT)
Dept: FAMILY MEDICINE CLINIC | Facility: CLINIC | Age: 80
End: 2025-01-15
Payer: MEDICARE

## 2025-01-15 ENCOUNTER — PATIENT MESSAGE (OUTPATIENT)
Dept: FAMILY MEDICINE CLINIC | Facility: CLINIC | Age: 80
End: 2025-01-15

## 2025-01-15 VITALS
BODY MASS INDEX: 26.06 KG/M2 | HEIGHT: 61 IN | OXYGEN SATURATION: 95 % | HEART RATE: 72 BPM | WEIGHT: 138 LBS | TEMPERATURE: 98.6 F | SYSTOLIC BLOOD PRESSURE: 132 MMHG | DIASTOLIC BLOOD PRESSURE: 80 MMHG

## 2025-01-15 DIAGNOSIS — D50.9 IRON DEFICIENCY ANEMIA, UNSPECIFIED IRON DEFICIENCY ANEMIA TYPE: ICD-10-CM

## 2025-01-15 DIAGNOSIS — Z78.0 POSTMENOPAUSAL: ICD-10-CM

## 2025-01-15 DIAGNOSIS — M81.0 AGE-RELATED OSTEOPOROSIS WITHOUT CURRENT PATHOLOGICAL FRACTURE: Primary | ICD-10-CM

## 2025-01-15 DIAGNOSIS — R15.9 FREQUENT FECAL INCONTINENCE: ICD-10-CM

## 2025-01-15 DIAGNOSIS — M51.360 DEGENERATION OF INTERVERTEBRAL DISC OF LUMBAR REGION WITH DISCOGENIC BACK PAIN: ICD-10-CM

## 2025-01-15 LAB
BASOPHILS # BLD AUTO: 0.03 10*3/MM3 (ref 0–0.2)
BASOPHILS NFR BLD AUTO: 0.4 % (ref 0–1.5)
DEPRECATED RDW RBC AUTO: 39.8 FL (ref 37–54)
EOSINOPHIL # BLD AUTO: 0.17 10*3/MM3 (ref 0–0.4)
EOSINOPHIL NFR BLD AUTO: 2.4 % (ref 0.3–6.2)
ERYTHROCYTE [DISTWIDTH] IN BLOOD BY AUTOMATED COUNT: 11.8 % (ref 12.3–15.4)
FERRITIN SERPL-MCNC: 247 NG/ML (ref 13–150)
HCT VFR BLD AUTO: 40.5 % (ref 34–46.6)
HGB BLD-MCNC: 13.5 G/DL (ref 12–15.9)
IMM GRANULOCYTES # BLD AUTO: 0.01 10*3/MM3 (ref 0–0.05)
IMM GRANULOCYTES NFR BLD AUTO: 0.1 % (ref 0–0.5)
IRON 24H UR-MRATE: 59 MCG/DL (ref 37–145)
IRON SATN MFR SERPL: 18 % (ref 20–50)
LYMPHOCYTES # BLD AUTO: 1.17 10*3/MM3 (ref 0.7–3.1)
LYMPHOCYTES NFR BLD AUTO: 16.8 % (ref 19.6–45.3)
MCH RBC QN AUTO: 30.8 PG (ref 26.6–33)
MCHC RBC AUTO-ENTMCNC: 33.3 G/DL (ref 31.5–35.7)
MCV RBC AUTO: 92.3 FL (ref 79–97)
MONOCYTES # BLD AUTO: 0.49 10*3/MM3 (ref 0.1–0.9)
MONOCYTES NFR BLD AUTO: 7 % (ref 5–12)
NEUTROPHILS NFR BLD AUTO: 5.11 10*3/MM3 (ref 1.7–7)
NEUTROPHILS NFR BLD AUTO: 73.3 % (ref 42.7–76)
NRBC BLD AUTO-RTO: 0 /100 WBC (ref 0–0.2)
PLATELET # BLD AUTO: 252 10*3/MM3 (ref 140–450)
PMV BLD AUTO: 11 FL (ref 6–12)
RBC # BLD AUTO: 4.39 10*6/MM3 (ref 3.77–5.28)
TIBC SERPL-MCNC: 322 MCG/DL (ref 298–536)
TRANSFERRIN SERPL-MCNC: 216 MG/DL (ref 200–360)
WBC NRBC COR # BLD AUTO: 6.98 10*3/MM3 (ref 3.4–10.8)

## 2025-01-15 PROCEDURE — 36415 COLL VENOUS BLD VENIPUNCTURE: CPT | Performed by: NURSE PRACTITIONER

## 2025-01-15 PROCEDURE — 83540 ASSAY OF IRON: CPT | Performed by: NURSE PRACTITIONER

## 2025-01-15 PROCEDURE — 1160F RVW MEDS BY RX/DR IN RCRD: CPT | Performed by: NURSE PRACTITIONER

## 2025-01-15 PROCEDURE — 84466 ASSAY OF TRANSFERRIN: CPT | Performed by: NURSE PRACTITIONER

## 2025-01-15 PROCEDURE — 82728 ASSAY OF FERRITIN: CPT | Performed by: NURSE PRACTITIONER

## 2025-01-15 PROCEDURE — G2211 COMPLEX E/M VISIT ADD ON: HCPCS | Performed by: NURSE PRACTITIONER

## 2025-01-15 PROCEDURE — 1125F AMNT PAIN NOTED PAIN PRSNT: CPT | Performed by: NURSE PRACTITIONER

## 2025-01-15 PROCEDURE — 99214 OFFICE O/P EST MOD 30 MIN: CPT | Performed by: NURSE PRACTITIONER

## 2025-01-15 PROCEDURE — 1159F MED LIST DOCD IN RCRD: CPT | Performed by: NURSE PRACTITIONER

## 2025-01-15 PROCEDURE — 85025 COMPLETE CBC W/AUTO DIFF WBC: CPT | Performed by: NURSE PRACTITIONER

## 2025-01-15 RX ORDER — ALENDRONATE SODIUM 70 MG/1
70 TABLET ORAL
Qty: 12 TABLET | Refills: 1 | Status: SHIPPED | OUTPATIENT
Start: 2025-01-15

## 2025-01-15 NOTE — PROGRESS NOTES
Venipuncture Blood Specimen Collection  Venipuncture performed in left arm by Gerri Meza with good hemostasis. Patient tolerated the procedure well without complications.   01/15/25   Gerri Meza   Therapy Benefits     Payor: [facility]   Authorization Needed: no  Maximum Visit Limit Per Year: based on med [facility]     Referred by: Delia Ng MD; Medical Diagnosis (from order):    Diagnosis Information      Diagnosis    V58.89, 840.8 (ICD-9-CM) - S46.812D (ICD-10-CM) - Strain of left trapezius muscle, subsequent encounter                Physical Therapy -  Daily Treatment Note    Visit:  8   Diagnosis Precautions: None    SUBJECTIVE                                                                                                             Patient notes she did some traveling this weekend where she was driving and Friday/sat was very uncomfortable. Yesterday started to calm down after doing stretches , used heat. Today pain is 4/10.  Burning sensation which started last Monday has finally calmed down a little. No tingling into left upper extremity today.     **Patient arrived 11 minutes late for appointment**    Pain / Symptoms:  Pain rating (out of 10): Current: 4   Quality / Description: burning.    OBJECTIVE                                                                                                                        TREATMENT                                                                                                                  Manual Therapy:  Seated soft tissue mobilization to left upper trap/levator, cervical, scalenes and medial scapulr region   Supine MFR left cervical, upper trap region   Supine gentle manual cervical traction, notes some increased burning sensation so stopped   Oscillating inferior pressure to superior aspect of bilateral shoulders  Supine passive upper trapezius stretching left  Supine passive levator scapulae stretching left      Skilled input: verbal instruction/cues, tactile instruction/cues and as detailed above    Home Exercise Program: Access Code: [email or web address]           URL: [email or web address]       Date: 10/06/2020   Prepared by: Geovanny Larson       Exercises  Gentle Levator Scapulae Stretch - 2 reps - 15-30 second hold - 2x daily - 7x weekly  Seated Gentle Upper Trapezius Stretch - 2 reps - 15-30 hold - 2x daily - 7x weekly    Access Code: [email or web address]   URL: [email or web address]   Date: 10/16/2020   Prepared by: Geovanny Larson     Exercises  Standing Shoulder Row with Anchored Resistance - 10 reps - 2x daily - 7x weekly  Shoulder Extension with Resistance - 10 reps - 2x daily - 7x weekly  Wall Push Up - 10 reps - 2x daily - 7x weekly    Ball rolls, wall slides    Moist Heat (51235)  Location: left upper trap   Position: supine  Temperature: 160° F  Duration: 15 minutes  Ultrasound (14316)  Location: Left upper trapezius/levator  Position: sitting  Duty Cycle: 100%  Frequency: 1 Mhz  Intensity (w/cm2): 1.5  Duration: 10 minutes  Results: decreased pain  Reaction: no adverse reaction to treatment      ASSESSMENT                                                                                                             Responded well to ultrasound this date. Fair tolerance to manual therapy this date with some increased discomfort immediately following. Gave hot pack following manual and this helped to decrease pain. Encouraged to continue with gentle stretching/strengtheing and will progress as able. Patient voicing some frustration regarding flare up last week.   Pain/symptoms after session: 2    Patient Education:   Results of above outlined education: Verbalizes understanding      PLAN                                                                                                                           Suggestions for next session as indicated: Progress per plan of care, stretching, soft tissue mobilization, strengthening, modalities for pain relief, updates to HEP as needed         Procedures and total treatment time documented Time Entry flowsheet.    Johnathan Abdalla PT was present and supervised this patient visit. Notes were reviewed and  care agreed upon.

## 2025-01-15 NOTE — PROGRESS NOTES
Chief Complaint  Anemia and Osteoporosis (Med refill)    History of Present Illness  Noni Bhardwaj is a 79 y.o. female who presents to [facility] with a past medical history of    Past Medical History:   Diagnosis Date    A-fib     Arthritis     Cancer Basal cell? cheek    2022    Cataract     Coronary artery disease 2019    AFib    Disease of thyroid gland     Hypothyroidism     Osteopenia     Stroke 2019    TIAs       History of Present Illness  The patient is a 79-year-old female who presents to the office today for follow-up.    She reports experiencing episodes of fecal incontinence, which she manages with half a tablet of Imodium every other day. She has not sought any treatment for this condition yet. She also ensures to have a bowel movement before leaving her house, especially when she has plans to be out for the day. She has not experienced any issues while being out as long as she has a bowel movement beforehand. She plans to address this issue in the future but has been preoccupied with personal matters.    She experienced a back injury in July 2024 while assisting her  with turning/moving in a [facility] care home. She has undergone MRI, NS consultation and PT. With PT her condition did improve and she reports resolution at this time. An MRI was conducted on 08/19/2024, and she consulted with a neurologist at [facility], who suggested surgical intervention with rods if her symptoms persisted. She was dissatisfied with the neurologist's approach and did not return for further consultations. She has a history of laminectomy at L5-S1, which was successful in alleviating her symptoms.    She has a mammogram scheduled.     She is currently on a weekly regimen of Fosamax for osteoporosis, which she tolerates well. She also takes calcium and vitamin D supplements. She reports no difficulty in swallowing the medication. She recently had lab work done,  "including a BMP on 12/26/2024. She acknowledges that her diet could be improved.    She discontinued her iron supplement a few months ago due to the expiration of the prescription and was uncertain about the necessity of continuing it.    She is on Eliquis and Tikosyn. She has had atrial fibrillation 2% or less since she had the ablation about a year ago.    MEDICATIONS  Current: Imodium, Fosamax, calcium, vitamin D, Tikosyn, Eliquis      Objective   Vital Signs:   Vitals:    01/15/25 1005   BP: 132/80   BP Location: Right arm   Pulse: 72   Temp: 98.6 °F (37 °C)   TempSrc: Temporal   SpO2: 95%   Weight: 62.6 kg (138 lb)   Height: 154.9 cm (61\")     Body mass index is 26.07 kg/m².    Wt Readings from Last 3 Encounters:   01/15/25 62.6 kg (138 lb)   10/14/24 62.1 kg (137 lb)   09/25/24 62.6 kg (138 lb)     BP Readings from Last 3 Encounters:   01/15/25 132/80   10/14/24 144/80   09/25/24 132/80       Health Maintenance   Topic Date Due    ANNUAL WELLNESS VISIT  03/04/2025    DXA SCAN  03/30/2025    ZOSTER VACCINE (2 of 2) 01/15/2025 (Originally 12/15/2020)    RSV Vaccine - Adults (1 - 1-dose 75+ series) 01/15/2026 (Originally 3/17/2020)    TDAP/TD VACCINES (1 - Tdap) 01/15/2026 (Originally 3/17/1964)    LIPID PANEL  02/27/2025    BMI FOLLOWUP  02/27/2025    HEPATITIS C SCREENING  Completed    COVID-19 Vaccine  Completed    INFLUENZA VACCINE  Completed    Pneumococcal Vaccine 65+  Completed       Physical Exam  Vitals reviewed.   Constitutional:       General: She is not in acute distress.     Appearance: She is well-developed and overweight. She is not ill-appearing.   HENT:      Head: Normocephalic and atraumatic.   Eyes:      General: No scleral icterus.        Right eye: No discharge.         Left eye: No discharge.      Extraocular Movements: Extraocular movements intact.      Conjunctiva/sclera: Conjunctivae normal.   Neck:      Vascular: No carotid bruit.      Trachea: Trachea normal.   Cardiovascular:      Rate " and Rhythm: Normal rate and regular rhythm.      Pulses: Normal pulses.      Heart sounds: No murmur heard.     Comments: NSR on exam today; no atrial fibrillation.  Pulmonary:      Effort: Pulmonary effort is normal.      Breath sounds: Normal breath sounds. No wheezing, rhonchi or rales.   Musculoskeletal:         General: Normal range of motion.      Cervical back: Normal range of motion and neck supple.      Right lower leg: No edema.      Left lower leg: No edema.   Skin:     General: Skin is warm and dry.   Neurological:      Mental Status: She is alert and oriented to person, place, and time.   Psychiatric:         Mood and Affect: Mood and affect normal.         Behavior: Behavior normal.         Thought Content: Thought content normal.         Judgment: Judgment normal.            Result Review :  The following data was reviewed by: ISADORA Duarte on 01/15/2025:    Common labs          3/20/2024    10:24 7/11/2024    12:10 12/26/2024    08:34   Common Labs   Glucose 96   84    BUN 17   16    Creatinine 0.95   0.94    Sodium 139   139    Potassium 3.9   4.4    Chloride 103   105    Calcium 9.9   9.7    WBC 6.86  9.24     Hemoglobin 12.2  13.5     Hematocrit 38.6  40.2     Platelets 270  259       Lipid Panel          2/27/2024    14:57   Lipid Panel   Total Cholesterol 155    Triglycerides 66    HDL Cholesterol 42    VLDL Cholesterol 13    LDL Cholesterol  100    LDL/HDL Ratio 2.38      TSH          2/27/2024    14:57   TSH   TSH 1.590      Magnesium (04/25/2024 12:15)  Phosphorus (04/25/2024 12:15)  Vitamin D,25-Hydroxy (04/25/2024 12:15)    MRI Lumbar Spine Without Contrast (08/13/2024 17:08)     Procedures        Assessment and Plan   Diagnoses and all orders for this visit:    1. Age-related osteoporosis without current pathological fracture (Primary)  -     DEXA Bone Density Axial; Future  -     alendronate (FOSAMAX) 70 MG tablet; Take 1 tablet by mouth Every 7 (Seven) Days.  Dispense: 12  tablet; Refill: 1    2. Postmenopausal  -     DEXA Bone Density Axial; Future    3. Iron deficiency anemia, unspecified iron deficiency anemia type  -     CBC Auto Differential  -     Iron Profile  -     Ferritin    4. Degeneration of intervertebral disc of lumbar region with discogenic back pain    5. Frequent fecal incontinence        Assessment & Plan  1. Fecal incontinence.  She continues to experience episodes of fecal incontinence, which she manages with half an Imodium every other day or as needed. She has not pursued further treatment due to a busy schedule, and wished to defer at this time.    2. Back pain.  She experienced back pain after moving her  in July 2024. An MRI on 08/19/2024 showed postoperative changes at L5-S1, degenerative disc and facet disease throughout the lumbar spine, multilevel spondylosis, central canal stenosis at L4-L5, and an asymmetric disc osteophyte complex on the right at L5-S1. She underwent physical therapy for a month, which improved her symptoms.    3. Osteoporosis.  She is currently taking Fosamax once a week without any issues. She also takes calcium and vitamin D supplements. Her last DEXA scan was in March 2023, and she is due for another in March 2025. A prescription for Fosamax 70 mg for 90 days with a refill will be sent to [facility]. A DEXA scan will be ordered for March 2025. Most recent vitamin D was normal.     4. Anemia.  Her CBC has shown significant improvement since March 2024, with hemoglobin levels increasing from 11.4 to 13.5. She stopped taking iron a couple of months ago because the prescription ran out. A CBC, iron profile, and ferritin test will be conducted today. If her iron levels are low, she will be restarted on iron supplements.    5. Atrial fibrillation.  She is currently on Eliquis and Tikosyn. She reports that her atrial fibrillation has been 2% or less since her ablation about a year ago.    6. Health maintenance.  She is due  for her annual wellness visit in March 2025. Her TSH was checked in February 2024. She has a mammogram scheduled. A comprehensive fasting profile will be obtained during her annual wellness visit in March 2025.    PROCEDURE  The patient underwent a laminectomy at L5-S1 in the past, which successfully alleviated her symptoms.                FOLLOW UP  Return in about 7 weeks (around 3/5/2025) for Subsequent Medicare Wellness.    Patient was given instructions and counseling regarding her condition or for health maintenance advice. Please see specific information pulled into the AVS if appropriate.       ISADORA Duarte  01/15/25  10:53 EST    CURRENT & DISCONTINUED MEDICATIONS  Current Outpatient Medications   Medication Instructions    alendronate (FOSAMAX) 70 mg, Oral, Every 7 Days    dofetilide (TIKOSYN) 500 mcg, Oral, 2 Times Daily    Eliquis 5 mg, 2 Times Daily    levothyroxine (SYNTHROID, LEVOTHROID) 75 mcg, Oral, Daily       Medications Discontinued During This Encounter   Medication Reason    methylPREDNISolone (MEDROL) 4 MG dose pack *Therapy completed    lidocaine (Lidoderm) 5 % *Therapy completed    alendronate (FOSAMAX) 70 MG tablet Reorder        Patient or patient representative verbalized consent for the use of Ambient Listening during the visit with  ISADORA Duarte for chart documentation. 1/15/2025  10:54 EST

## 2025-01-16 ENCOUNTER — PATIENT MESSAGE (OUTPATIENT)
Dept: FAMILY MEDICINE CLINIC | Facility: CLINIC | Age: 80
End: 2025-01-16
Payer: MEDICARE

## 2025-01-16 DIAGNOSIS — D50.9 IRON DEFICIENCY ANEMIA, UNSPECIFIED IRON DEFICIENCY ANEMIA TYPE: Primary | ICD-10-CM

## 2025-01-16 RX ORDER — FERROUS SULFATE 325(65) MG
325 TABLET ORAL
Qty: 90 TABLET | Refills: 0 | Status: SHIPPED | OUTPATIENT
Start: 2025-01-16

## 2025-01-29 ENCOUNTER — HOSPITAL ENCOUNTER (OUTPATIENT)
Dept: MAMMOGRAPHY | Facility: HOSPITAL | Age: 80
Discharge: HOME | End: 2025-01-29
Admitting: FAMILY MEDICINE
Payer: MEDICARE

## 2025-01-29 DIAGNOSIS — Z12.31 VISIT FOR SCREENING MAMMOGRAM: ICD-10-CM

## 2025-01-29 PROCEDURE — 77063 BREAST TOMOSYNTHESIS BI: CPT

## 2025-01-29 PROCEDURE — 77067 SCR MAMMO BI INCL CAD: CPT

## 2025-01-30 ENCOUNTER — PATIENT MESSAGE (OUTPATIENT)
Dept: FAMILY MEDICINE CLINIC | Facility: CLINIC | Age: 80
End: 2025-01-30
Payer: MEDICARE

## 2025-01-30 DIAGNOSIS — R92.8 ABNORMAL MAMMOGRAM: Primary | ICD-10-CM

## 2025-01-30 NOTE — PROGRESS NOTES
Call pt: mammogram showed calcifications in left breat but no actual masses. It was recommended to get diagnostic mammogram and possible ultrasound of left breast.  I have ordered these things to ensure there is not an abnormality in the left breast.

## 2025-02-21 ENCOUNTER — HOSPITAL ENCOUNTER (OUTPATIENT)
Dept: ULTRASOUND IMAGING | Facility: HOSPITAL | Age: 80
Discharge: HOME | End: 2025-02-21
Payer: MEDICARE

## 2025-02-21 ENCOUNTER — HOSPITAL ENCOUNTER (OUTPATIENT)
Dept: MAMMOGRAPHY | Facility: HOSPITAL | Age: 80
Discharge: HOME | End: 2025-02-21
Payer: MEDICARE

## 2025-02-21 DIAGNOSIS — R92.8 ABNORMAL MAMMOGRAM: ICD-10-CM

## 2025-02-21 PROCEDURE — 77065 DX MAMMO INCL CAD UNI: CPT

## 2025-02-21 PROCEDURE — G0279 TOMOSYNTHESIS, MAMMO: HCPCS

## 2025-02-24 NOTE — PROGRESS NOTES
Call pt: left breast had irregular nodular density seen so biopsy has been set up.  Please go and get the biopsy done.  Let me know if you have any questions.

## 2025-03-04 ENCOUNTER — PATIENT OUTREACH (OUTPATIENT)
Dept: ONCOLOGY | Facility: HOSPITAL | Age: 80
End: 2025-03-04

## 2025-03-04 ENCOUNTER — HOSPITAL ENCOUNTER (OUTPATIENT)
Dept: MAMMOGRAPHY | Facility: HOSPITAL | Age: 80
Discharge: HOME | End: 2025-03-04
Payer: MEDICARE

## 2025-03-04 DIAGNOSIS — R92.1 BREAST CALCIFICATION, LEFT: ICD-10-CM

## 2025-03-04 PROCEDURE — C1819 TISSUE LOCALIZATION-EXCISION: HCPCS

## 2025-03-04 PROCEDURE — 25010000002 LIDOCAINE 1 % SOLUTION

## 2025-03-04 PROCEDURE — 63710000001 BACITRACIN 500 UNIT/GM OINTMENT

## 2025-03-04 PROCEDURE — 88305 TISSUE EXAM BY PATHOLOGIST: CPT | Performed by: FAMILY MEDICINE

## 2025-03-04 PROCEDURE — 76098 X-RAY EXAM SURGICAL SPECIMEN: CPT

## 2025-03-04 PROCEDURE — A4648 IMPLANTABLE TISSUE MARKER: HCPCS

## 2025-03-04 PROCEDURE — A9270 NON-COVERED ITEM OR SERVICE: HCPCS

## 2025-03-04 PROCEDURE — 25010000002 LIDOCAINE 1% - EPINEPHRINE 1:100000 1 %-1:100000 SOLUTION

## 2025-03-04 RX ORDER — LIDOCAINE HYDROCHLORIDE AND EPINEPHRINE 10; 10 MG/ML; UG/ML
INJECTION, SOLUTION INFILTRATION; PERINEURAL
Status: COMPLETED
Start: 2025-03-04 | End: 2025-03-04

## 2025-03-04 RX ORDER — LIDOCAINE HYDROCHLORIDE 10 MG/ML
INJECTION, SOLUTION INFILTRATION; PERINEURAL
Status: COMPLETED
Start: 2025-03-04 | End: 2025-03-04

## 2025-03-04 RX ORDER — DIAPER,BRIEF,INFANT-TODD,DISP
EACH MISCELLANEOUS
Status: COMPLETED
Start: 2025-03-04 | End: 2025-03-04

## 2025-03-04 RX ADMIN — LIDOCAINE HYDROCHLORIDE,EPINEPHRINE BITARTRATE: 10; .01 INJECTION, SOLUTION INFILTRATION; PERINEURAL at 11:19

## 2025-03-04 RX ADMIN — BACITRACIN: 500 OINTMENT TOPICAL at 11:19

## 2025-03-04 RX ADMIN — LIDOCAINE HYDROCHLORIDE: 10 INJECTION, SOLUTION INFILTRATION; PERINEURAL at 11:19

## 2025-03-06 ENCOUNTER — OFFICE VISIT (OUTPATIENT)
Dept: FAMILY MEDICINE CLINIC | Facility: CLINIC | Age: 80
End: 2025-03-06
Payer: MEDICARE

## 2025-03-06 VITALS
OXYGEN SATURATION: 96 % | HEART RATE: 90 BPM | DIASTOLIC BLOOD PRESSURE: 70 MMHG | WEIGHT: 140.4 LBS | SYSTOLIC BLOOD PRESSURE: 120 MMHG | TEMPERATURE: 97.4 F | HEIGHT: 61 IN | BODY MASS INDEX: 26.51 KG/M2

## 2025-03-06 DIAGNOSIS — Z01.84 IMMUNITY STATUS TESTING: ICD-10-CM

## 2025-03-06 DIAGNOSIS — R05.3 UNEXPLAINED CHRONIC COUGH: ICD-10-CM

## 2025-03-06 DIAGNOSIS — R59.0 LYMPHADENOPATHY OF HEAD AND NECK REGION: ICD-10-CM

## 2025-03-06 DIAGNOSIS — E03.9 HYPOTHYROIDISM, UNSPECIFIED TYPE: ICD-10-CM

## 2025-03-06 DIAGNOSIS — M81.0 AGE-RELATED OSTEOPOROSIS WITHOUT CURRENT PATHOLOGICAL FRACTURE: ICD-10-CM

## 2025-03-06 DIAGNOSIS — D24.2 INTRADUCTAL PAPILLOMA OF BREAST, LEFT: ICD-10-CM

## 2025-03-06 DIAGNOSIS — E66.3 OVERWEIGHT WITH BODY MASS INDEX (BMI) OF 26 TO 26.9 IN ADULT: ICD-10-CM

## 2025-03-06 DIAGNOSIS — D36.9 INTRADUCTAL PAPILLOMA: Primary | ICD-10-CM

## 2025-03-06 DIAGNOSIS — D50.9 IRON DEFICIENCY ANEMIA, UNSPECIFIED IRON DEFICIENCY ANEMIA TYPE: ICD-10-CM

## 2025-03-06 DIAGNOSIS — Z00.00 MEDICARE ANNUAL WELLNESS VISIT, SUBSEQUENT: Primary | ICD-10-CM

## 2025-03-06 DIAGNOSIS — R92.8 ABNORMAL MAMMOGRAM: ICD-10-CM

## 2025-03-06 RX ORDER — FERROUS SULFATE 325(65) MG
325 TABLET ORAL
Qty: 90 TABLET | Refills: 1 | Status: SHIPPED | OUTPATIENT
Start: 2025-03-06

## 2025-03-06 RX ORDER — ALENDRONATE SODIUM 70 MG/1
70 TABLET ORAL
Qty: 12 TABLET | Refills: 1 | Status: SHIPPED | OUTPATIENT
Start: 2025-03-06

## 2025-03-06 RX ORDER — LEVOTHYROXINE SODIUM 75 UG/1
TABLET ORAL
Qty: 90 TABLET | Refills: 1 | Status: SHIPPED | OUTPATIENT
Start: 2025-03-06

## 2025-03-06 NOTE — PROGRESS NOTES
Call pt: breast biopsy shows intraductal papilloma, which is in itself benign, but it can sometimes form a cancer.  Will refer to general surgery so they can decide on imaging versus surgery.   Follow-up if you have any questions.

## 2025-03-06 NOTE — PROGRESS NOTES
Subjective   The ABCs of the Annual Wellness Visit  Medicare Wellness Visit      Noni Bhardwaj is a 79 y.o. patient who presents for a Medicare Wellness Visit.    The following portions of the patient's history were reviewed and   updated as appropriate: allergies, current medications, past family history, past medical history, past social history, past surgical history, and problem list.    Compared to one year ago, the patient's physical health is better.    Compared to one year ago, the patient's mental health is the same.    Recent Hospitalizations:  She was not admitted to the hospital during the last year.     Current Medical Providers:  Patient Care Team:  Lucy Wayne APRN as PCP - General (Nurse Practitioner)  Maya Waller, MARISOL as Nurse Navigator  Viji Patterson PA as Physician Assistant (Cardiology)  Rena Rooney MD as Cardiologist (Internal Medicine)    Outpatient Medications Prior to Visit   Medication Sig Dispense Refill    dofetilide (TIKOSYN) 500 MCG capsule Take 1 capsule by mouth 2 (Two) Times a Day.      Eliquis 5 MG tablet tablet Take 1 tablet by mouth 2 (Two) Times a Day.      alendronate (FOSAMAX) 70 MG tablet Take 1 tablet by mouth Every 7 (Seven) Days. 12 tablet 1    ferrous sulfate 325 (65 FE) MG tablet Take 1 tablet by mouth Daily With Breakfast. 90 tablet 0    levothyroxine (SYNTHROID, LEVOTHROID) 75 MCG tablet Take 1 tablet by mouth Daily. 90 tablet 1     No facility-administered medications prior to visit.     No opioid medication identified on active medication list. I have reviewed chart for other potential  high risk medication/s and harmful drug interactions in the elderly.      Aspirin is not on active medication list.  Aspirin use is contraindicated for this patient due to: current use of Eliquis.      Patient Active Problem List   Diagnosis    Aftercare following surgery of right shoulder arthroscopic subacromial decompression, biceps tenotomy, mini open  "rotator cuff repair, 2/8/2021    Osteoarthritis of carpometacarpal (CMC) joints of both thumbs    Bilateral wrist pain    Tendinitis, de Quervain's    Osteoarthritis of right thumb    Paroxysmal A-fib    Iron deficiency anemia     Advance Care Planning Advance Directive is not on file.  ACP discussion was held with the patient during this visit. Patient has an advance directive (not in EMR), copy requested.            Objective   Vitals:    03/06/25 0930   BP: 120/70   Pulse: 90   Temp: 97.4 °F (36.3 °C)   SpO2: 96%   Weight: 63.7 kg (140 lb 6.4 oz)   Height: 154.9 cm (61\")   PainSc: 0-No pain       Estimated body mass index is 26.53 kg/m² as calculated from the following:    Height as of this encounter: 154.9 cm (61\").    Weight as of this encounter: 63.7 kg (140 lb 6.4 oz).    BMI is >= 25 and <30. (Overweight) The following options were offered after discussion;: information on healthy weight added to patient's after visit summary            Does the patient have evidence of cognitive impairment? No                                                                                                Health  Risk Assessment    Smoking Status:  Social History     Tobacco Use   Smoking Status Never    Passive exposure: Never   Smokeless Tobacco Never     Alcohol Consumption:  Social History     Substance and Sexual Activity   Alcohol Use Not Currently    Alcohol/week: 2.0 standard drinks of alcohol    Comment: Once a month       Fall Risk Screen  STEADI Fall Risk Assessment was completed, and patient is at LOW risk for falls.Assessment completed on:3/6/2025    Depression Screening   Little interest or pleasure in doing things? Not at all   Feeling down, depressed, or hopeless? Not at all   PHQ-2 Total Score 0      Health Habits and Functional and Cognitive Screening:      3/6/2025     9:00 AM   Functional & Cognitive Status   Do you have difficulty preparing food and eating? No   Do you have difficulty bathing yourself, " getting dressed or grooming yourself? No   Do you have difficulty using the toilet? No   Do you have difficulty moving around from place to place? No   Do you have trouble with steps or getting out of a bed or a chair? No   Current Diet Well Balanced Diet   Dental Exam Up to date   Eye Exam Up to date   Exercise (times per week) 3 times per week   Current Exercises Include Walking   Do you need help using the phone?  No   Are you deaf or do you have serious difficulty hearing?  No   Do you need help to go to places out of walking distance? No   Do you need help shopping? No   Do you need help preparing meals?  No   Do you need help with housework?  No   Do you need help with laundry? No   Do you need help taking your medications? No   Do you need help managing money? No   Do you ever drive or ride in a car without wearing a seat belt? No   Have you felt unusual stress, anger or loneliness in the last month? No   Who do you live with? Alone   If you need help, do you have trouble finding someone available to you? No   Have you been bothered in the last four weeks by sexual problems? No   Do you have difficulty concentrating, remembering or making decisions? No           Age-appropriate Screening Schedule:  Refer to the list below for future screening recommendations based on patient's age, sex and/or medical conditions. Orders for these recommended tests are listed in the plan section. The patient has been provided with a written plan.    Health Maintenance List  Health Maintenance   Topic Date Due    ZOSTER VACCINE (2 of 2) 12/15/2020    LIPID PANEL  02/27/2025    BMI FOLLOWUP  02/27/2025    DXA SCAN  03/30/2025    RSV Vaccine - Adults (1 - 1-dose 75+ series) 01/15/2026 (Originally 3/17/2020)    TDAP/TD VACCINES (1 - Tdap) 01/15/2026 (Originally 3/17/1964)    ANNUAL WELLNESS VISIT  03/06/2026    HEPATITIS C SCREENING  Completed    COVID-19 Vaccine  Completed    INFLUENZA VACCINE  Completed    Pneumococcal Vaccine  50+  Completed                                                                                                                                                CMS Preventative Services Quick Reference  Risk Factors Identified During Encounter    Alcohol Misuse: Patient encouraged to limit alcohol use to no more than 1 standard alcoholic beverage per day. (12 ounce beer, 6 ounce wine, one shot liquor)  Immunizations Discussed/Encouraged: Shingrix, RSV (Respiratory Syncytial Virus), and MMR  Inactivity/Sedentary: Patient was advised to exercise at least 150 minutes a week per CDC recommendations.  Dental Screening Recommended  Vision Screening Recommended    The above risks/problems have been discussed with the patient.    Pertinent information has been shared with the patient in the After Visit Summary.    An After Visit Summary and PPPS were made available to the patient.    Follow Up:     Next Medicare Wellness visit to be scheduled in 1 year.          Additional E&M Note during same encounter follows:  Patient has multiple medical problems which are significant and separately identifiable that require additional work above and beyond the Medicare Wellness Visit.      Chief Complaint  Medicare Wellness-subsequent, Hypothyroidism, and Anemia    Noni Bhardwaj is a 79 y.o. female who presents to [facility]     History of Present Illness  The patient is a 79-year-old female who presents to the office today for a Medicare annual wellness visit, follow-up on chronic health conditions, and medication refills.    She has received her breast biopsy results, which indicate benign papillomas. She is agreeable to see a breast surgeon to determine whether or not she should have this removed, or if she should surveillance with imaging.    Her physical health has improved compared to the previous year, following a cardiac ablation procedure. Her mental health remains stable. She has not required  hospitalization within the past year.     She has not seen GI in several years. She had been referred to a gastroenterologist's assistant who prescribed medication, but she discontinued it due to lack of efficacy. She continues to endorse episodic fecal incontinence, but at this time does not wish to pursue workup.     She is under the care of Dr. Holley, a cardiologist at [facility].     She has experienced weight loss, which she attributes to dietary changes, including skipping lunch and consuming protein bars in their place - she is not commuting daily to visit her  [facility].     She is planning a trip to [place] next week and is concerned about a measles outbreak there. She received her measles vaccine in childhood and has not had any boosters since. She has not received the RSV vaccine.     She is requesting a 3-month refill of her Fosamax prescription.     She continues to take iron supplements for iron deficiency.     She reports no changes in her hair, skin, or nails. She has noticed enlargement of her lymph nodes and has a palpable mass in her neck. She is on thyroid medication, taking 75 mcg six times a week, skipping Wednesdays.    She experiences a cough when she wakes up during the night or in the morning, which she describes as different from her usual cough. She has not had any recent chest imaging. She reports no shortness of breath. She underwent extensive imaging while hospitalized for her ablation procedure. She was diagnosed with atelectasis, but this diagnosis was later retracted. She discussed her symptoms with HAN Sanchez, in 01/2025, who suggested that they could be related to atelectasis and advised her to report any new symptoms. She occasionally experiences voice changes when shouting at sporting events, but does not feel hoarse. She reports no associated pain or shortness of breath. She has a chronic cough that occurs every night when she  "wakes up to use the bathroom. She has never smoked.    She has bowel issues. She had a colonoscopy at age 66, which did not reveal any polyps. She is considering Cologuard testing due to concerns about her ability to undergo another colonoscopy.    She is currently on Eliquis and does not take baby aspirin daily.    SOCIAL HISTORY  She does not smoke. She might have alcohol once a month socially.    FAMILY HISTORY  She does not have a family history of colon cancer.    MEDICATIONS  Eliquis, Fosamax, iron supplements    IMMUNIZATIONS  She has had the shingles vaccination, pneumonia shot, COVID-19 shot, and flu shot.    Objective   Vital Signs:   Vitals:    03/06/25 0930   BP: 120/70   Pulse: 90   Temp: 97.4 °F (36.3 °C)   SpO2: 96%   Weight: 63.7 kg (140 lb 6.4 oz)   Height: 154.9 cm (61\")   PainSc: 0-No pain       Wt Readings from Last 3 Encounters:   03/06/25 63.7 kg (140 lb 6.4 oz)   01/15/25 62.6 kg (138 lb)   10/14/24 62.1 kg (137 lb)     BP Readings from Last 3 Encounters:   03/06/25 120/70   01/15/25 132/80   10/14/24 144/80       Physical Exam  Vitals reviewed.   Constitutional:       General: She is not in acute distress.     Appearance: She is well-developed and overweight. She is not ill-appearing.   HENT:      Head: Normocephalic and atraumatic.   Eyes:      General: No scleral icterus.        Right eye: No discharge.         Left eye: No discharge.      Extraocular Movements: Extraocular movements intact.      Conjunctiva/sclera: Conjunctivae normal.   Neck:      Thyroid: No thyroid mass, thyromegaly or thyroid tenderness.      Vascular: No carotid bruit.      Trachea: Trachea normal.     Cardiovascular:      Rate and Rhythm: Normal rate and regular rhythm.      Pulses: Normal pulses.      Heart sounds: No murmur heard.  Pulmonary:      Effort: Pulmonary effort is normal.      Breath sounds: Normal breath sounds. No wheezing, rhonchi or rales.   Musculoskeletal:         General: Normal range of motion. "      Cervical back: Normal range of motion and neck supple. No tenderness.      Right lower leg: No edema.      Left lower leg: No edema.   Lymphadenopathy:      Cervical: Cervical adenopathy present.   Skin:     General: Skin is warm and dry.   Neurological:      Mental Status: She is alert and oriented to person, place, and time.   Psychiatric:         Mood and Affect: Mood and affect normal.         Behavior: Behavior normal.         Thought Content: Thought content normal.         Judgment: Judgment normal.         The following data was reviewed by ISADORA Duarte on 03/06/2025:    Common labs          7/11/2024    12:10 12/26/2024    08:34 1/15/2025    10:42   Common Labs   Glucose  84     BUN  16     Creatinine  0.94     Sodium  139     Potassium  4.4     Chloride  105     Calcium  9.7     WBC 9.24   6.98    Hemoglobin 13.5   13.5    Hematocrit 40.2   40.5    Platelets 259   252      Mammo Stereotactic Breast Biopsy Initial With & Without Device    Addendum Date: 3/5/2025    Pathology result is available and shows the following: - Intraductal papilloma with microcalcifications - Apocrine metaplasia  This benign result is concordant with imaging findings. Given the finding of an intraductal papilloma, recommend surgical consultation to discuss options of excision versus imaging follow-up.    3/5/2025 8:49 AM by Seble Diaz MD on Workstation: [id number]      Result Date: 3/5/2025  Technically successful stereotactic biopsy of left breast calcifications, with the biopsy clip in expected position. After final pathology has been reviewed, an addendum will be dictated for concordance and further recommendations.  3/4/2025 12:36 PM by Seble Diaz MD on Workstation: [id number]      Mammo Post Device Placement Left    Addendum Date: 3/5/2025    Pathology result is available and shows the following: - Intraductal papilloma with microcalcifications - Apocrine metaplasia  This benign result is concordant with  imaging findings. Given the finding of an intraductal papilloma, recommend surgical consultation to discuss options of excision versus imaging follow-up.    3/5/2025 8:49 AM by Seble Diaz MD on Workstation: [id number]      Result Date: 3/5/2025  Technically successful stereotactic biopsy of left breast calcifications, with the biopsy clip in expected position. After final pathology has been reviewed, an addendum will be dictated for concordance and further recommendations.  3/4/2025 12:36 PM by Seble Diaz MD on Workstation: [id number]      Mammo Stereotactic Breast Specimen Left    Addendum Date: 3/5/2025    Pathology result is available and shows the following: - Intraductal papilloma with microcalcifications - Apocrine metaplasia  This benign result is concordant with imaging findings. Given the finding of an intraductal papilloma, recommend surgical consultation to discuss options of excision versus imaging follow-up.    3/5/2025 8:49 AM by Seble Diaz MD on Workstation: [email or web address]      Result Date: 3/5/2025  Technically successful stereotactic biopsy of left breast calcifications, with the biopsy clip in expected position. After final pathology has been reviewed, an addendum will be dictated for concordance and further recommendations.  3/4/2025 12:36 PM by Seble Diaz MD on Workstation: [id number]      Mammo Diagnostic Digital Tomosynthesis Left With CAD    Result Date: 2/21/2025   IMPRESSION: Suspicious microcalcifications, left breast.  The patient was set up for stereotactic biopsy of the left breast.    TISSUE DENSITY: There are scattered areas of fibroglandular density.  BI-RADS ASSESSMENT: BI-RADS 4. Suspicious abnormality.   Note: It has been reported that there is approximately a 15% false negative in mammography. Therefore, management of a palpable abnormality should not be deferred because of a negative mammogram.    2/21/2025 5:40 PM by SHANNAN STAFFORD MD on Workstation: [id number]      Mammo  Screening Digital Tomosynthesis Bilateral With CAD    Result Date: 1/30/2025  Increased calcifications in the anterior left breast. These may be within a degenerating fibroadenoma however they're not well characterized on these views. Magnification left breast CC and true lateral view and true lateral left breast mammogram recommended. Ultrasound could be performed if necessary.  BI-RADS ASSESSMENT: Category 0: Incomplete-Need Additional Imaging Evaluation  Note: It has been reported that there is approximately a 15% false negative rate in mammography.  Therefore, management of a palpable abnormality should not be deferred because of a negative mammogram.  Electronically Signed By-Laxmi Polo MD On:1/30/2025 10:02 AM      Tissue Pathology Exam (03/04/2025 11:05)       Assessment & Plan   Diagnoses and all orders for this visit:    1. Medicare annual wellness visit, subsequent (Primary)  -     CBC Auto Differential; Future  -     Comprehensive Metabolic Panel; Future  -     Lipid Panel; Future  -     TSH+Free T4; Future    2. Overweight with body mass index (BMI) of 26 to 26.9 in adult    3. Intraductal papilloma of breast, left  -     Ambulatory Referral to Breast Surgery    4. Immunity status testing  -     Measles / Mumps / Rubella Immunity; Future    5. Age-related osteoporosis without current pathological fracture  -     alendronate (FOSAMAX) 70 MG tablet; Take 1 tablet by mouth Every 7 (Seven) Days.  Dispense: 12 tablet; Refill: 1  -     Magnesium; Future  -     Phosphorus; Future  -     Vitamin D,25-Hydroxy; Future    6. Hypothyroidism, unspecified type  -     levothyroxine (SYNTHROID, LEVOTHROID) 75 MCG tablet; Take 1 PO daily x6 days of the week, skipping Wednesday.  Dispense: 90 tablet; Refill: 1  -     TSH+Free T4; Future    7. Iron deficiency anemia, unspecified iron deficiency anemia type  -     ferrous sulfate 325 (65 FE) MG tablet; Take 1 tablet by mouth Daily With Breakfast.  Dispense: 90 tablet;  Refill: 1  -     Iron Profile; Future  -     Ferritin; Future    8. Lymphadenopathy of head and neck region  -     US Head Neck Soft Tissue; Future    9. Unexplained chronic cough  -     CT Chest With Contrast Diagnostic; Future        Assessment & Plan  1. Medicare annual wellness visit.  Her weight has decreased by 10 pounds since March of last year, but this is not a concern. Her BMI is currently 26. She is at low risk for falls and does not exhibit signs of depression. She is physically active and does not require assistance with daily activities. She has received her shingles, pneumonia, COVID-19, and influenza vaccines. She is due for a DEXA scan at the end of March. She has a power of  in place with her son. Fasting labs will be ordered, including an MMR test to assess her immunity status. She is advised to consider receiving the RSV vaccine.    2. Intraductal papilloma.  A stereotactic guided core biopsy of the left breast at the 12 o'clock position revealed an intraductal papilloma with microcalcifications and apocrine metaplasia. This benign result is concordant with imaging findings. Given the finding of an intraductal papilloma, a surgical consultation is recommended to discuss options of excision versus imaging follow-up. A referral will be made to a breast surgeon for further evaluation and management.    3. Chronic cough.  She has been experiencing a chronic cough mostly at night since her cardiac ablation on 02/13/2024. A chest x-ray was normal last year after the initial presentation. A CT scan of the chest and an ultrasound of the neck will be ordered to further investigate the cause of the cough.    4. Iron deficiency.  She continues to take iron supplements for iron deficiency.    5. Thyroid disorder.  She is taking her thyroid medication 75 mcg six times a week, skipping Wednesdays. A prescription for her thyroid medication will be sent to the pharmacy.    6. Medication management.  She  requested a 3-month supply of Fosamax. It was noted that the pharmacy might be dispensing it monthly due to insurance requirements. She is advised to check with her pharmacy regarding any changes in her insurance that might affect the dispensing of her medication. She is also taking Eliquis, which she gets from a pharmacy in [place]. She mentioned that her supply was intercepted by the FDA, and she may need a few pills before her resupply. She can get Eliquis from her cardiologist if needed.    7. Bowel issues.  She has bowel issues. She had a colonoscopy at age 66, which did not reveal any polyps. She is considering Cologuard testing due to concerns about her ability to undergo another colonoscopy.    PROCEDURE  The patient underwent a breast biopsy which showed intraductal papilloma with microcalcifications and apocrine metaplasia.       BMI is >= 25 and <30. (Overweight) The following options were offered after discussion;: information on healthy weight added to patient's after visit summary        FOLLOW UP  Return in about 6 months (around 9/6/2025) for Next scheduled follow up, medication refills and fasting labs.    Patient was given instructions and counseling regarding her condition or for health maintenance advice. Please see specific information pulled into the AVS if appropriate.     Patient or patient representative verbalized consent for the use of Ambient Listening during the visit with  ISADORA Duaret for chart documentation. 3/6/2025  10:00 EST    ISADORA Duarte  03/06/25  10:32 EST

## 2025-03-18 ENCOUNTER — PATIENT MESSAGE (OUTPATIENT)
Dept: FAMILY MEDICINE CLINIC | Facility: CLINIC | Age: 80
End: 2025-03-18
Payer: MEDICARE

## 2025-03-21 ENCOUNTER — CLINICAL SUPPORT (OUTPATIENT)
Dept: FAMILY MEDICINE CLINIC | Facility: CLINIC | Age: 80
End: 2025-03-21
Payer: MEDICARE

## 2025-03-21 DIAGNOSIS — Z00.00 MEDICARE ANNUAL WELLNESS VISIT, SUBSEQUENT: ICD-10-CM

## 2025-03-21 DIAGNOSIS — D50.9 IRON DEFICIENCY ANEMIA, UNSPECIFIED IRON DEFICIENCY ANEMIA TYPE: ICD-10-CM

## 2025-03-21 DIAGNOSIS — Z01.84 IMMUNITY STATUS TESTING: ICD-10-CM

## 2025-03-21 DIAGNOSIS — M81.0 AGE-RELATED OSTEOPOROSIS WITHOUT CURRENT PATHOLOGICAL FRACTURE: ICD-10-CM

## 2025-03-21 DIAGNOSIS — E03.9 HYPOTHYROIDISM, UNSPECIFIED TYPE: ICD-10-CM

## 2025-03-21 LAB
25(OH)D3 SERPL-MCNC: 36.1 NG/ML (ref 30–100)
ALBUMIN SERPL-MCNC: 3.9 G/DL (ref 3.5–5.2)
ALBUMIN/GLOB SERPL: 1.5 G/DL
ALP SERPL-CCNC: 61 U/L (ref 39–117)
ALT SERPL W P-5'-P-CCNC: 21 U/L (ref 1–33)
ANION GAP SERPL CALCULATED.3IONS-SCNC: 11 MMOL/L (ref 5–15)
AST SERPL-CCNC: 30 U/L (ref 1–32)
BASOPHILS # BLD AUTO: 0.03 10*3/MM3 (ref 0–0.2)
BASOPHILS NFR BLD AUTO: 0.6 % (ref 0–1.5)
BILIRUB SERPL-MCNC: 0.3 MG/DL (ref 0–1.2)
BUN SERPL-MCNC: 19 MG/DL (ref 8–23)
BUN/CREAT SERPL: 20.2 (ref 7–25)
CALCIUM SPEC-SCNC: 9.7 MG/DL (ref 8.6–10.5)
CHLORIDE SERPL-SCNC: 103 MMOL/L (ref 98–107)
CHOLEST SERPL-MCNC: 214 MG/DL (ref 0–200)
CO2 SERPL-SCNC: 24 MMOL/L (ref 22–29)
CREAT SERPL-MCNC: 0.94 MG/DL (ref 0.57–1)
DEPRECATED RDW RBC AUTO: 41.4 FL (ref 37–54)
EGFRCR SERPLBLD CKD-EPI 2021: 61.5 ML/MIN/1.73
EOSINOPHIL # BLD AUTO: 0.18 10*3/MM3 (ref 0–0.4)
EOSINOPHIL NFR BLD AUTO: 3.8 % (ref 0.3–6.2)
ERYTHROCYTE [DISTWIDTH] IN BLOOD BY AUTOMATED COUNT: 12.3 % (ref 12.3–15.4)
FERRITIN SERPL-MCNC: 214 NG/ML (ref 13–150)
GLOBULIN UR ELPH-MCNC: 2.6 GM/DL
GLUCOSE SERPL-MCNC: 80 MG/DL (ref 65–99)
HCT VFR BLD AUTO: 39.9 % (ref 34–46.6)
HDLC SERPL-MCNC: 67 MG/DL (ref 40–60)
HGB BLD-MCNC: 12.8 G/DL (ref 12–15.9)
IMM GRANULOCYTES # BLD AUTO: 0.01 10*3/MM3 (ref 0–0.05)
IMM GRANULOCYTES NFR BLD AUTO: 0.2 % (ref 0–0.5)
IRON 24H UR-MRATE: 79 MCG/DL (ref 37–145)
IRON SATN MFR SERPL: 26 % (ref 20–50)
LDLC SERPL CALC-MCNC: 136 MG/DL (ref 0–100)
LDLC/HDLC SERPL: 2.01 {RATIO}
LYMPHOCYTES # BLD AUTO: 1.04 10*3/MM3 (ref 0.7–3.1)
LYMPHOCYTES NFR BLD AUTO: 22.2 % (ref 19.6–45.3)
MAGNESIUM SERPL-MCNC: 2.2 MG/DL (ref 1.6–2.4)
MCH RBC QN AUTO: 29.5 PG (ref 26.6–33)
MCHC RBC AUTO-ENTMCNC: 32.1 G/DL (ref 31.5–35.7)
MCV RBC AUTO: 91.9 FL (ref 79–97)
MONOCYTES # BLD AUTO: 0.4 10*3/MM3 (ref 0.1–0.9)
MONOCYTES NFR BLD AUTO: 8.5 % (ref 5–12)
NEUTROPHILS NFR BLD AUTO: 3.03 10*3/MM3 (ref 1.7–7)
NEUTROPHILS NFR BLD AUTO: 64.7 % (ref 42.7–76)
NRBC BLD AUTO-RTO: 0 /100 WBC (ref 0–0.2)
PHOSPHATE SERPL-MCNC: 3.2 MG/DL (ref 2.5–4.5)
PLATELET # BLD AUTO: 230 10*3/MM3 (ref 140–450)
PMV BLD AUTO: 11.1 FL (ref 6–12)
POTASSIUM SERPL-SCNC: 4.4 MMOL/L (ref 3.5–5.2)
PROT SERPL-MCNC: 6.5 G/DL (ref 6–8.5)
RBC # BLD AUTO: 4.34 10*6/MM3 (ref 3.77–5.28)
SODIUM SERPL-SCNC: 138 MMOL/L (ref 136–145)
T4 FREE SERPL-MCNC: 1.42 NG/DL (ref 0.92–1.68)
TIBC SERPL-MCNC: 304 MCG/DL (ref 298–536)
TRANSFERRIN SERPL-MCNC: 204 MG/DL (ref 200–360)
TRIGL SERPL-MCNC: 61 MG/DL (ref 0–150)
TSH SERPL DL<=0.05 MIU/L-ACNC: 1.95 UIU/ML (ref 0.27–4.2)
VLDLC SERPL-MCNC: 11 MG/DL (ref 5–40)
WBC NRBC COR # BLD AUTO: 4.69 10*3/MM3 (ref 3.4–10.8)

## 2025-03-21 PROCEDURE — 80061 LIPID PANEL: CPT | Performed by: NURSE PRACTITIONER

## 2025-03-21 PROCEDURE — 82306 VITAMIN D 25 HYDROXY: CPT | Performed by: NURSE PRACTITIONER

## 2025-03-21 PROCEDURE — 84466 ASSAY OF TRANSFERRIN: CPT | Performed by: NURSE PRACTITIONER

## 2025-03-21 PROCEDURE — 83540 ASSAY OF IRON: CPT | Performed by: NURSE PRACTITIONER

## 2025-03-21 PROCEDURE — 84100 ASSAY OF PHOSPHORUS: CPT | Performed by: NURSE PRACTITIONER

## 2025-03-21 PROCEDURE — 36415 COLL VENOUS BLD VENIPUNCTURE: CPT | Performed by: NURSE PRACTITIONER

## 2025-03-21 PROCEDURE — 80053 COMPREHEN METABOLIC PANEL: CPT | Performed by: NURSE PRACTITIONER

## 2025-03-21 PROCEDURE — 82728 ASSAY OF FERRITIN: CPT | Performed by: NURSE PRACTITIONER

## 2025-03-21 PROCEDURE — 83735 ASSAY OF MAGNESIUM: CPT | Performed by: NURSE PRACTITIONER

## 2025-03-21 PROCEDURE — 84443 ASSAY THYROID STIM HORMONE: CPT | Performed by: NURSE PRACTITIONER

## 2025-03-21 PROCEDURE — 85025 COMPLETE CBC W/AUTO DIFF WBC: CPT | Performed by: NURSE PRACTITIONER

## 2025-03-21 PROCEDURE — 84439 ASSAY OF FREE THYROXINE: CPT | Performed by: NURSE PRACTITIONER

## 2025-03-21 NOTE — PROGRESS NOTES
Venipuncture Blood Specimen Collection  Venipuncture performed in left arm by Gerri Meza  with good hemostasis. Patient tolerated the procedure well without complications.   03/21/25   Michelle Kemp MA

## 2025-03-24 NOTE — PROGRESS NOTES
Consult (Benign Breast)            History of Present Illness  Noni Bhardwaj is a 80 y.o. female who presents to [facility] as a consult from ISADORA Lee to discuss benign left breast findings.  She reports that the  lesion was detected during her breast screening imaging exam. She has not prior history of breast abnormalities and no family history of breast or ovarian cancer.   She is also here to discuss breast reconstructive options.  She wears 34B bra size. She is not wanting implants if we decide to remove this area and it becomes asymmetrical, but she denies pain.    No family history of breast cancer. No history of skin infections. Does see cardiology for CAD, A-fib and TIA's. Does take Eliquis. Sees Dr. Rooney at [facility]. She states her  is in a 's nursing home and when she goes to help lift him at times her left breast gets super sore and red. Non smoker.        Patient has a history of several TIAs in 2019. She was diagnosed with a fib and was put on Eliquis. She then had to have a cardial ablation in 2024 and was continued on eliquis after that.     Patient denies chest pain or shortness of breath.  She is following up with cardiology regularly.  Patient has been getting regular screening mammograms and had another screening mammogram on January 30, 2025.  Additional imaging was recommended and she had diagnostic mammogram on February 21, 2025.  This showed area of new pleomorphic calcifications in left breast at 12:00, BI-RADS 4 and stereotactic biopsy was recommended.  She underwent stereotactic biopsy on March 4, 2025.  Pathology showed intraductal papilloma and apocrine metaplasia.    This was in concordance with imaging and is thought to be benign.  Given the presence of intraductal papilloma she was recommended to have surgical evaluation for possible excision versus imaging follow-up.    Patient presents today for  evaluation.  She states that prior to screening mammogram she did not feel any breast masses.  She also states she does not perform breast exam.  She denies history of nipple drainage, breast pain, skin retraction or any other changes.  She denies unintentional weight loss, night sweats or chills.  She denies any concerning lymphadenopathy.    She states after the biopsy she had very little bruising however the week after the biopsy she had to help move her  who lives in a nursing care facility due to Alzheimer's, she bumped her breast at that time and had severe pain and bruising afterwards.  She managed it with ice packs.  She now feels small firm area in the central left breast that is slowly improving.    She otherwise feels well.  She has no other concerns.  History of Present Illness        Past Medical History:   Diagnosis Date    A-fib     Arthritis     Cancer Basal cell? cheek    2022    Cataract     Coronary artery disease 2019    AFib    Disease of thyroid gland     Hypothyroidism     Osteopenia     Stroke 2019    TIAs       Past Surgical History:   Procedure Laterality Date    APPENDECTOMY      BACK SURGERY      CARDIAC ABLATION  02/13/2024    CARDIAC CATHETERIZATION  2019    Loop inserted    CARDIAC SURGERY  2.13.24    Ablation    COLONOSCOPY      EYE SURGERY  2017    Sadi cataracts    HYSTERECTOMY      SPINE SURGERY  2013    Lumbar laminectomy    SUBTOTAL HYSTERECTOMY      TONSILLECTOMY           Social History     Socioeconomic History    Marital status:    Tobacco Use    Smoking status: Never     Passive exposure: Never    Smokeless tobacco: Never   Vaping Use    Vaping status: Never Used   Substance and Sexual Activity    Alcohol use: Not Currently     Alcohol/week: 2.0 standard drinks of alcohol     Comment: Once a month    Drug use: Never    Sexual activity: Not Currently     Partners: Male     Birth control/protection: Hysterectomy     Family history: negative for breast cancer      Subjective      Patient has no known allergies.  Allergies Reconciled.     Lab Results   Component Value Date    CASEREPORT  03/04/2025     Surgical Pathology Report                         Case: [id number]                                  Authorizing Provider:  Seble Diaz MD       Collected:           03/04/2025 11:05 AM          Ordering Location:     [facility]      Received:            03/04/2025 11:41 AM                                 MAMMOGRAPHY                                                                  Pathologist:           Deep Washington MD                                                            Specimen:    Breast, Left, left stereo bx; 12:00                                                        CASEREPORT  01/23/2023     Surgical Pathology Report                         Case: [id number]                                  Authorizing Provider:  Chi Jasso MD   Collected:           01/23/2023 05:13 PM          Ordering Location:     [facility]     Received:            01/23/2023 05:13 PM                                 [facility]                                                        Pathologist:           Deep Washington MD                                                            Specimen:    Back, Upper                                                                                FINALDX  03/04/2025     Left breast, 12 o'clock position, stereotactic-guided core biopsy:   - Intraductal papilloma with microcalcifications  - Apocrine metaplasia      FINALDX  01/23/2023     Skin, upper back, excision:   -Well differentiated squamous cell carcinoma, margins negative       Mammo Stereotactic Breast Biopsy Initial With & Without Device  Mammo Stereotactic Breast Biopsy Initial With & Without Device, Mammo Post Device Placement Left  Mammo Stereotactic Breast Biopsy Initial With & Without Device, Mammo Post Device Placement Left, Mammo  Stereotactic Breast Specimen Left  Addendum Date: 3/5/2025  Addendum: Pathology result is available and shows the following: - Intraductal papilloma with microcalcifications - Apocrine metaplasia  This benign result is concordant with imaging findings. Given the finding of an intraductal papilloma, recommend surgical consultation to discuss options of excision versus imaging follow-up.    3/5/2025 8:49 AM by Seble Diaz MD on Workstation: [id number]      Result Date: 3/5/2025  Narrative: PROCEDURE: MAMMO STEREOTACTIC BREAST BIOPSY INITIAL W WO DEVICE-, MAMMO STEREOTACTIC BREAST SPECIMEN LEFT-, MAMMO POST DEVICE PLACEMENT LEFT-  DATE OF EXAM: 3/4/2025 10:08 AM  INDICATIONS: 79-year-old woman recommended for stereotactic biopsy of left breast calcifications.  COMPARISON: 2/21/2025, 1/29/2025, 1/25/2024, 12/1/2022.  PROCEDURE: The patient was informed of the risks, benefits, and alternatives of procedure, and written consent was obtained. The patient's medication list was reviewed. Calcifications of concern were reviewed on the prior diagnostic mammogram. Time out was performed. Calcifications of concern in the left breast were targeted with  and prebiopsy images from a caudocranial (from below) approach.  The site was prepped and draped using sterile barrier technique. Local lidocaine without and with epinephrine was administered locally. An 9 gauge Brevera vacuum-assisted core needle biopsy device was advanced into the breast. Targeting was confirmed with prefire radiographs. The needle was then fired. Post fire radiographs were then performed. 7 specimens were obtained. Specimen radiograph confirms the presence of calcifications within the specimens. A coil-shaped HydroMARK biopsy clip was then placed. Digital pressure was then held at the site of biopsy to assist with hemostasis.  Post biopsy mammogram was performed including CC and true lateral views. The biopsy clip is in expected position in the biopsy  "bed.      Impression: Technically successful stereotactic biopsy of left breast calcifications, with the biopsy clip in expected position. After final pathology has been reviewed, an addendum will be dictated for concordance and further recommendations.  3/4/2025 12:36 PM by Seble Diaz MD on Workstation: [id number]          GYNECOLOGIC HISTORY:   . P: 1. AB: none.  Menarche: 14  Last menstrual period: 30+ years ago.  Age at first childbirth: 31   Lactation/How long: breast fed- couple weeks        Review of Systems  All system were reviewed and were negative, except the ones noted above.     @Objective     /80 (BP Location: Right arm, Patient Position: Sitting, Cuff Size: Adult)   Pulse 69   Temp 97.8 °F (36.6 °C) (Temporal)   Ht 154.9 cm (60.98\")   Wt 64 kg (141 lb)   SpO2 94%   BMI 26.66 kg/m²     Body mass index is 26.66 kg/m².           Physical Exam      Physical exam: Vital signs stable  General: Pleasant older woman in no apparent distress, appears healthy  CV: Regular rate, no murmur  Lungs: Breathing comfortably on room air, clear to auscultation bilaterally, no wheezing    Breast: Bilateral small breasts with grade 3 ptosis, right breast slightly larger than the left, right breast is soft without any concerning masses or density, no nipple drainage or skin retraction, left breast without any external bruising, palpable dense tissue in the subareolar position that is not tender to palpation, no overlying skin changes or nipple drainage, no other concerning masses  Left superior medial breast just lateral to the sternum palpable subcutaneous cord, cardiac pacer lead  Lymph : No cervical, supraclavicular or axillary lymphadenopathy  Psych: Alert and oriented  Neuro: Intact        Result Review :   The following data was reviewed by: Vidhi Sellers MD on 2025:    Patient had screening mammogram 2025 -BI-RADS 0  Diagnostic mammogram 2025: Left breast " pleomorphic microcalcifications BI-RADS 4  Left breast stereotactic biopsy on March 4, 2025: Pathology showed intraductal papilloma and apocrine metaplasia  I reviewed patient's medication list, cardiology notes  Most recent lab work on March 21, 2025 showed elevated cholesterol and ferritin, hemoglobin and creatinine within normal limits  I am not able to access actual EKG tracing or chest x-ray ray results but she had both studies in 2024.  Per patient's primary care provider note Lucy Wayne, chest x-ray did not show any concerning findings    Pathology results from recent left breast biopsy were reviewed.              Assessment and Plan        Patient is an 80-year-old woman with history of coronary artery disease, TIAs, atrial fibrillation status post ablation in February 2024 who presents today for evaluation for recently diagnosed left breast intraductal papilloma.      Patient does not routinely perform self breast exam and was found to have pleomorphic microcalcifications on routine screening mammogram prompting further diagnostic imaging including stereotactic biopsy which was performed on March 4, 2025.  Pathology showed intraductal papilloma.    She was recommended to have surgical evaluation to discuss possible excision versus imaging surveillance.    On exam today patient has bilateral small breasts with age-appropriate changes, the right breast is slightly larger than the left.  Left breast has palpable subareolar dense tissue on palpation but otherwise both breasts are soft, no skin retraction and no nipple drainage.  She has no concerning lymphadenopathy.  She has a palpable cord on the left upper medial breast just lateral to the sternal border which she says is a residual cord from prior pacer that was left in place.    While patient reports not having significant bruising after the biopsy she did bump her breast a week after the biopsy and had significant swelling and bruising at that  time.  I discussed with the patient that the bruising and swelling especially after small hematoma takes a while to resolve and part of the dense tissue that I am feeling on exam today is very likely part of the resolving hematoma.    She did have a clip placed at the biopsy site during stereotactic breast biopsy.     I discussed with the patient her pathology reports showing intraductal papilloma.  I discussed with her that most of the time this is a benign lesion, however there is approximately 8 to 9% chance that it harbors malignancy.  Some studies would suggest that upgraded diagnosis rate is approximately 20% upon lesion excision but that includes atypical cells and not necessarily malignancy.    I discussed with the patient that his surgical option would be wire localized lumpectomy that would require approximately golf ball sized tissue excision, possibly slightly smaller.  Given the central subareolar position of the dense tissue I feel on exam today, a lumpectomy will lead to decrease in size as well as breast deformity.  This is an invasive procedure requiring general anesthesia and given her history of cardiac disease and prior TIAs as well as being on anticoagulation she is at high risk for complications.  Her Eliquis will have to be stopped for surgery and I would have to work closely with her cardiologist and follow their recommendations regarding restarting Eliquis.  Stopping Eliquis puts her at high risk for stroke or heart attack.  Restarting Eliquis after surgery puts her at high risk for bleeding and possible need for reoperation for hematoma.    On the other hand, she can follow-up with repeat imaging every 3 to 6 months to monitor for any changes.  If there are any additional concerning changes she will likely have another biopsy that may reveal new diagnosis.  If that is the case she can consider surgical intervention at that time.    Overall I discussed with the patient that given her  advanced age as well as serious comorbidities and chronic anticoagulation I would favor her continuing with repeat imaging at this time simply because I think this is the safest option for her.  If concerning PE and 8-9% possibility of having breast cancer at this time is given her extreme anxiety then we can proceed with surgical planning however I do not think it is absolutely necessary at this time.    After discussion patient states that she is always very nervous stopping her Eliquis.  And given these options she also prefers to follow-up with repeat imaging.    Plan:   -- I recommend patient has repeat diagnostic breast imaging in 3 to 6 months.  I suspect due to her pacer lead in place she is not a candidate for breast MRI, but if she is able to have breast MRI that would be a great study to perform as well  -- I am happy to assist the patient in any way I can but at this time we do not plan for any surgical intervention  -- She will follow-up with me as needed        Follow Up     No follow-ups on file.    Patient was given instructions and counseling regarding her condition. Please see specific information pulled into the AVS if appropriate.     Vidhi Sellers MD  04/02/2025

## 2025-03-25 ENCOUNTER — RESULTS FOLLOW-UP (OUTPATIENT)
Dept: FAMILY MEDICINE CLINIC | Facility: CLINIC | Age: 80
End: 2025-03-25
Payer: MEDICARE

## 2025-03-25 NOTE — LETTER
Nonidebora Bhardwaj  [address]  [place] [address]    March 28, 2025     Dear Ms. Bhardwaj:    CBC is normal.  CMP is normal.  Thyroid profile is normal.     Iron profile is currently normal.  Over the past 3 months your iron saturation has improved from 18% to 26%.  Total iron improved from 59-79.  Ferritin has been trending down which is good.     Lipid profile is abnormal such that your total cholesterol is 214 and your LDL cholesterol is 136.  HDL is good at 67.  This provides some cardiovascular protection.  Based on the elevated total cholesterol and LDL lipid-lowering medication is advised.  I will leave that decision up to you.  Have you taken a statin in the past and had an adverse reaction?  Or have you just been apprehensive to take anything in general?  If you choose not to take medication then diet and lifestyle changes are recommended.  Focus on whole foods and limit your intake of processed foods or fast foods.  Try to exercise 20 to 30 minutes daily.        Vitamin D is normal.  Phosphorus is normal.  Magnesium is normal.    Resulted Orders   CBC Auto Differential   Result Value Ref Range    WBC 4.69 3.40 - 10.80 10*3/mm3    RBC 4.34 3.77 - 5.28 10*6/mm3    Hemoglobin 12.8 12.0 - 15.9 g/dL    Hematocrit 39.9 34.0 - 46.6 %    MCV 91.9 79.0 - 97.0 fL    MCH 29.5 26.6 - 33.0 pg    MCHC 32.1 31.5 - 35.7 g/dL    RDW 12.3 12.3 - 15.4 %    RDW-SD 41.4 37.0 - 54.0 fl    MPV 11.1 6.0 - 12.0 fL    Platelets 230 140 - 450 10*3/mm3    Neutrophil % 64.7 42.7 - 76.0 %    Lymphocyte % 22.2 19.6 - 45.3 %    Monocyte % 8.5 5.0 - 12.0 %    Eosinophil % 3.8 0.3 - 6.2 %    Basophil % 0.6 0.0 - 1.5 %    Immature Grans % 0.2 0.0 - 0.5 %    Neutrophils, Absolute 3.03 1.70 - 7.00 10*3/mm3    Lymphocytes, Absolute 1.04 0.70 - 3.10 10*3/mm3    Monocytes, Absolute 0.40 0.10 - 0.90 10*3/mm3    Eosinophils, Absolute 0.18 0.00 - 0.40 10*3/mm3    Basophils, Absolute 0.03 0.00 - 0.20 10*3/mm3    Immature Grans, Absolute 0.01  0.00 - 0.05 10*3/mm3    nRBC 0.0 0.0 - 0.2 /100 WBC   Comprehensive Metabolic Panel   Result Value Ref Range    Glucose 80 65 - 99 mg/dL    BUN 19 8 - 23 mg/dL    Creatinine 0.94 0.57 - 1.00 mg/dL    Sodium 138 136 - 145 mmol/L    Potassium 4.4 3.5 - 5.2 mmol/L    Chloride 103 98 - 107 mmol/L    CO2 24.0 22.0 - 29.0 mmol/L    Calcium 9.7 8.6 - 10.5 mg/dL    Total Protein 6.5 6.0 - 8.5 g/dL    Albumin 3.9 3.5 - 5.2 g/dL    ALT (SGPT) 21 1 - 33 U/L    AST (SGOT) 30 1 - 32 U/L    Alkaline Phosphatase 61 39 - 117 U/L    Total Bilirubin 0.3 0.0 - 1.2 mg/dL    Globulin 2.6 gm/dL    A/G Ratio 1.5 g/dL    BUN/Creatinine Ratio 20.2 7.0 - 25.0    Anion Gap 11.0 5.0 - 15.0 mmol/L    eGFR 61.5 >60.0 mL/min/1.73   Lipid Panel   Result Value Ref Range    Total Cholesterol 214 (H) 0 - 200 mg/dL    Triglycerides 61 0 - 150 mg/dL    HDL Cholesterol 67 (H) 40 - 60 mg/dL    LDL Cholesterol  136 (H) 0 - 100 mg/dL    VLDL Cholesterol 11 5 - 40 mg/dL    LDL/HDL Ratio 2.01    TSH+Free T4   Result Value Ref Range    TSH 1.950 0.270 - 4.200 uIU/mL    Free T4 1.42 0.92 - 1.68 ng/dL   Magnesium   Result Value Ref Range    Magnesium 2.2 1.6 - 2.4 mg/dL   Phosphorus   Result Value Ref Range    Phosphorus 3.2 2.5 - 4.5 mg/dL   Iron Profile   Result Value Ref Range    Iron 79 37 - 145 mcg/dL    Iron Saturation (TSAT) 26 20 - 50 %    Transferrin 204 200 - 360 mg/dL    TIBC 304 298 - 536 mcg/dL   Ferritin   Result Value Ref Range    Ferritin 214.00 (H) 13.00 - 150.00 ng/mL   Vitamin D,25-Hydroxy   Result Value Ref Range    25 Hydroxy, Vitamin D 36.1 30.0 - 100.0 ng/ml         If you have any questions or concerns, please don't hesitate to call.         Sincerely,        Lucy Wayne, APRN

## 2025-03-27 ENCOUNTER — PATIENT OUTREACH (OUTPATIENT)
Dept: ONCOLOGY | Facility: HOSPITAL | Age: 80
End: 2025-03-27
Payer: MEDICARE

## 2025-04-02 ENCOUNTER — OFFICE VISIT (OUTPATIENT)
Dept: PLASTIC SURGERY | Facility: CLINIC | Age: 80
End: 2025-04-02
Payer: MEDICARE

## 2025-04-02 ENCOUNTER — TELEPHONE (OUTPATIENT)
Dept: PLASTIC SURGERY | Facility: CLINIC | Age: 80
End: 2025-04-02

## 2025-04-02 VITALS
TEMPERATURE: 97.8 F | HEIGHT: 61 IN | DIASTOLIC BLOOD PRESSURE: 80 MMHG | BODY MASS INDEX: 26.62 KG/M2 | OXYGEN SATURATION: 94 % | SYSTOLIC BLOOD PRESSURE: 150 MMHG | WEIGHT: 141 LBS | HEART RATE: 69 BPM

## 2025-04-02 DIAGNOSIS — I25.10 CORONARY ARTERY DISEASE INVOLVING NATIVE CORONARY ARTERY WITHOUT ANGINA PECTORIS, UNSPECIFIED WHETHER NATIVE OR TRANSPLANTED HEART: ICD-10-CM

## 2025-04-02 DIAGNOSIS — D24.2 INTRADUCTAL PAPILLOMA OF BREAST, LEFT: Primary | ICD-10-CM

## 2025-04-02 DIAGNOSIS — Z79.01 CHRONIC ANTICOAGULATION: ICD-10-CM

## 2025-04-03 ENCOUNTER — CLINICAL SUPPORT (OUTPATIENT)
Dept: FAMILY MEDICINE CLINIC | Facility: CLINIC | Age: 80
End: 2025-04-03
Payer: MEDICARE

## 2025-04-03 DIAGNOSIS — E03.9 HYPOTHYROIDISM, UNSPECIFIED TYPE: ICD-10-CM

## 2025-04-03 DIAGNOSIS — D50.9 IRON DEFICIENCY ANEMIA, UNSPECIFIED IRON DEFICIENCY ANEMIA TYPE: ICD-10-CM

## 2025-04-03 DIAGNOSIS — Z01.84 IMMUNITY STATUS TESTING: ICD-10-CM

## 2025-04-03 DIAGNOSIS — M81.0 AGE-RELATED OSTEOPOROSIS WITHOUT CURRENT PATHOLOGICAL FRACTURE: ICD-10-CM

## 2025-04-03 DIAGNOSIS — Z00.00 MEDICARE ANNUAL WELLNESS VISIT, SUBSEQUENT: ICD-10-CM

## 2025-04-03 PROCEDURE — 86762 RUBELLA ANTIBODY: CPT | Performed by: NURSE PRACTITIONER

## 2025-04-03 PROCEDURE — 86735 MUMPS ANTIBODY: CPT | Performed by: NURSE PRACTITIONER

## 2025-04-03 PROCEDURE — 86765 RUBEOLA ANTIBODY: CPT | Performed by: NURSE PRACTITIONER

## 2025-04-03 PROCEDURE — 36415 COLL VENOUS BLD VENIPUNCTURE: CPT | Performed by: NURSE PRACTITIONER

## 2025-04-03 NOTE — PROGRESS NOTES
Venipuncture Blood Specimen Collection  Venipuncture performed in left arm  by Amanda Oseguera with good hemostasis. Patient tolerated the procedure well without complications.   04/03/25   Amanda Oseguera

## 2025-04-05 ENCOUNTER — RESULTS FOLLOW-UP (OUTPATIENT)
Dept: FAMILY MEDICINE CLINIC | Facility: CLINIC | Age: 80
End: 2025-04-05
Payer: MEDICARE

## 2025-04-05 DIAGNOSIS — D36.9 INTRADUCTAL PAPILLOMA: Primary | ICD-10-CM

## 2025-04-05 DIAGNOSIS — R92.8 ABNORMAL MAMMOGRAM: ICD-10-CM

## 2025-04-05 LAB
MEV IGG SER IA-ACNC: >300 AU/ML
MUV IGG SER IA-ACNC: 71.1 AU/ML
RUBV IGG SERPL IA-ACNC: >33 INDEX

## 2025-04-10 ENCOUNTER — HOSPITAL ENCOUNTER (OUTPATIENT)
Dept: BONE DENSITY | Facility: HOSPITAL | Age: 80
Discharge: HOME | End: 2025-04-10
Admitting: NURSE PRACTITIONER
Payer: MEDICARE

## 2025-04-10 DIAGNOSIS — Z78.0 POSTMENOPAUSAL: ICD-10-CM

## 2025-04-10 DIAGNOSIS — M81.0 AGE-RELATED OSTEOPOROSIS WITHOUT CURRENT PATHOLOGICAL FRACTURE: ICD-10-CM

## 2025-04-10 PROCEDURE — 77080 DXA BONE DENSITY AXIAL: CPT

## 2025-04-17 ENCOUNTER — HOSPITAL ENCOUNTER (OUTPATIENT)
Dept: ULTRASOUND IMAGING | Facility: HOSPITAL | Age: 80
Discharge: HOME | End: 2025-04-17
Payer: MEDICARE

## 2025-04-17 ENCOUNTER — HOSPITAL ENCOUNTER (OUTPATIENT)
Dept: CT IMAGING | Facility: HOSPITAL | Age: 80
Discharge: HOME | End: 2025-04-17
Payer: MEDICARE

## 2025-04-17 DIAGNOSIS — R05.3 UNEXPLAINED CHRONIC COUGH: ICD-10-CM

## 2025-04-17 DIAGNOSIS — R59.0 LYMPHADENOPATHY OF HEAD AND NECK REGION: ICD-10-CM

## 2025-04-17 PROCEDURE — 71260 CT THORAX DX C+: CPT

## 2025-04-17 PROCEDURE — 25510000001 IOPAMIDOL PER 1 ML: Performed by: NURSE PRACTITIONER

## 2025-04-17 PROCEDURE — 76536 US EXAM OF HEAD AND NECK: CPT

## 2025-04-17 RX ORDER — IOPAMIDOL 755 MG/ML
100 INJECTION, SOLUTION INTRAVASCULAR
Status: COMPLETED | OUTPATIENT
Start: 2025-04-17 | End: 2025-04-17

## 2025-04-17 RX ADMIN — IOPAMIDOL 100 ML: 755 INJECTION, SOLUTION INTRAVENOUS at 11:29

## 2025-05-30 ENCOUNTER — OFFICE VISIT (OUTPATIENT)
Dept: ORTHOPEDIC SURGERY | Facility: CLINIC | Age: 80
End: 2025-05-30
Payer: MEDICARE

## 2025-05-30 VITALS
HEART RATE: 68 BPM | WEIGHT: 141 LBS | HEIGHT: 61 IN | SYSTOLIC BLOOD PRESSURE: 153 MMHG | OXYGEN SATURATION: 95 % | DIASTOLIC BLOOD PRESSURE: 81 MMHG | BODY MASS INDEX: 26.62 KG/M2

## 2025-05-30 DIAGNOSIS — M25.551 RIGHT HIP PAIN: Primary | ICD-10-CM

## 2025-05-30 DIAGNOSIS — M70.61 TROCHANTERIC BURSITIS OF RIGHT HIP: ICD-10-CM

## 2025-05-30 DIAGNOSIS — M18.11 ARTHRITIS OF CARPOMETACARPAL (CMC) JOINT OF RIGHT THUMB: ICD-10-CM

## 2025-05-30 DIAGNOSIS — M76.891 RIGHT HIP TENDONITIS: ICD-10-CM

## 2025-05-30 RX ORDER — TRIAMCINOLONE ACETONIDE 40 MG/ML
40 INJECTION, SUSPENSION INTRA-ARTICULAR; INTRAMUSCULAR
Status: COMPLETED | OUTPATIENT
Start: 2025-05-30 | End: 2025-05-30

## 2025-05-30 RX ORDER — LIDOCAINE HYDROCHLORIDE 10 MG/ML
1 INJECTION, SOLUTION INFILTRATION; PERINEURAL
Status: COMPLETED | OUTPATIENT
Start: 2025-05-30 | End: 2025-05-30

## 2025-05-30 RX ADMIN — LIDOCAINE HYDROCHLORIDE 1 ML: 10 INJECTION, SOLUTION INFILTRATION; PERINEURAL at 10:15

## 2025-05-30 RX ADMIN — TRIAMCINOLONE ACETONIDE 40 MG: 40 INJECTION, SUSPENSION INTRA-ARTICULAR; INTRAMUSCULAR at 10:15

## 2025-05-30 NOTE — PROGRESS NOTES
"Chief Complaint  Follow-up of the Right Hip and Follow-up of the Right Hand     Subjective      Noni Bhardwaj presents to [facility] for follow up of the right hip. She has CMC of the right thumb she has had injections in the past.  She notes the pain has started coming back. She has pain in the right hip for awhile that she has been treated for hip bursitis.      No Known Allergies     Social History     Socioeconomic History    Marital status:    Tobacco Use    Smoking status: Never     Passive exposure: Never    Smokeless tobacco: Never   Vaping Use    Vaping status: Never Used   Substance and Sexual Activity    Alcohol use: Not Currently     Alcohol/week: 2.0 standard drinks of alcohol     Comment: Once a month    Drug use: Never    Sexual activity: Not Currently     Partners: Male     Birth control/protection: Hysterectomy        I reviewed the patient's chief complaint, history of present illness, review of systems, past medical history, surgical history, family history, social history, medications, and allergy list.     Review of Systems     Constitutional: Denies fevers, chills, weight loss  Cardiovascular: Denies chest pain, shortness of breath  Skin: Denies rashes, acute skin changes  Neurologic: Denies headache, loss of consciousness        Vital Signs:   /81   Pulse 68   Ht 154.9 cm (60.98\")   Wt 64 kg (141 lb)   SpO2 95%   BMI 26.66 kg/m²            Ortho Exam    Physical Exam  General:Alert. No acute distress   RIGHT HIP Hip Flexion 90. Internal rotation 25. External rotation 40. Tender over the greater trochanter. No skin discoloration, atrophy or swelling. Positive EHL, FHL, GS, and TA. Sensation intact to all 5 nerves of the foot. Negative straight leg raise. Leg lengths appear equal. Ambulates with Non-antalgic gait    RIGHT HAND Negative Compression testing/ Negative Tinels. NegativeFinkelsteins. Negative Pérez's testing. Positive CMC grind testing. " Negative Phalens. Full ROM of the hand, fingers, elbow and wrist. Positive Triggering of the digit. Sensation grossly intact to light touch, median, radial and ulnar nerve. Positive AIN, PIN and ulnar nerve motor function intact. Axillary nerve intact. Positive pulses.        Small Joint Arthrocentesis: R thumb CMC  Consent given by: patient  Site marked: site marked  Timeout: Immediately prior to procedure a time out was called to verify the correct patient, procedure, equipment, support staff and site/side marked as required   Procedure Details  Location: thumb - R thumb CMC  Preparation: Patient was prepped and draped in the usual sterile fashion  Medications administered: 1 mL lidocaine 1 %; 40 mg triamcinolone acetonide 40 MG/ML  Patient tolerance: patient tolerated the procedure well with no immediate complications    This injection documentation was Scribed for Linus Ngo MD by AKSHAT Latham.  05/30/25   11:20 EDT     X-Ray Report:  Right hip X-Ray  Indication: Evaluation of the right hip   AP/Lateral view(s)  Findings: Mild degenerative changes of the right hip with osteophytes of the greater trochanter.   Prior studies available for comparison: yes       Imaging Results (Most Recent)       Procedure Component Value Units Date/Time    XR Hip With or Without Pelvis 2 - 3 View Right - In process [[id number]] Resulted: 05/30/25 1055     Updated: 05/30/25 1059    This result has not been signed. Information might be incomplete.               Result Review :       No results found.           Assessment and Plan     Diagnoses and all orders for this visit:    1. Right hip pain (Primary)  -     XR Hip With or Without Pelvis 2 - 3 View Right    2. Right hip tendonitis    3. Trochanteric bursitis of right hip    4. Arthritis of carpometacarpal (CMC) joint of right thumb    Other orders  -     Small Joint Arthrocentesis: R thumb CMC        Discussed the treatment plan with the patient. I reviewed the  X-rays that were obtained today with the patient.     Physical therapy for the right hip for tendonitis and trochanteric hip bursitis.      Discussed the risks and benefits of conservative measures.  The patient expressed understanding and wished to proceed with right thumb CMC injection.  She tolerated the injection well.     Discussed with the patient that due to the steroid injection given today in the office they may see an increase in blood sugar for a few days. Advised patient to monitor sugar after receiving the injection.     Discussed possibility of a reaction from the injection.  Discussed the possibility that the injection may not completely improve or remove the pain.  Discussed the risk of infection.      Call or return if worsening symptoms.    Follow Up     3 months to assess how she is doing.         Patient was given instructions and counseling regarding her condition or for health maintenance advice. Please see specific information pulled into the AVS if appropriate.     Scribed for Linus Ngo MD by Toña Nobles MA.  05/30/25   10:52 EDT    I have personally performed the services described in this document as scribed by the above individual and it is both accurate and complete. Linus Ngo MD 05/30/25

## 2025-06-11 DIAGNOSIS — E03.9 HYPOTHYROIDISM, UNSPECIFIED TYPE: ICD-10-CM

## 2025-06-11 RX ORDER — LEVOTHYROXINE SODIUM 75 UG/1
75 TABLET ORAL DAILY
Qty: 90 TABLET | Refills: 0 | Status: SHIPPED | OUTPATIENT
Start: 2025-06-11

## 2025-06-26 ENCOUNTER — OFFICE VISIT (OUTPATIENT)
Dept: FAMILY MEDICINE CLINIC | Facility: CLINIC | Age: 80
End: 2025-06-26
Payer: MEDICARE

## 2025-06-26 VITALS
BODY MASS INDEX: 26.28 KG/M2 | WEIGHT: 139 LBS | SYSTOLIC BLOOD PRESSURE: 128 MMHG | HEART RATE: 85 BPM | TEMPERATURE: 97.6 F | OXYGEN SATURATION: 97 % | DIASTOLIC BLOOD PRESSURE: 76 MMHG

## 2025-06-26 DIAGNOSIS — E78.5 DYSLIPIDEMIA, GOAL LDL BELOW 100: Primary | ICD-10-CM

## 2025-06-26 DIAGNOSIS — M81.0 AGE-RELATED OSTEOPOROSIS WITHOUT CURRENT PATHOLOGICAL FRACTURE: ICD-10-CM

## 2025-06-26 DIAGNOSIS — R15.9 FREQUENT FECAL INCONTINENCE: ICD-10-CM

## 2025-06-26 PROCEDURE — 83695 ASSAY OF LIPOPROTEIN(A): CPT | Performed by: NURSE PRACTITIONER

## 2025-06-26 PROCEDURE — 82172 ASSAY OF APOLIPOPROTEIN: CPT | Performed by: NURSE PRACTITIONER

## 2025-06-26 NOTE — PROGRESS NOTES
Chief Complaint  Hyperlipidemia (Discuss starting a statin medication )    Hyperlipidemia    Primary Care Follow-Up  Conditions present: hyperlipidemia    Treatment compliance:  All of the time  Treatment barriers:  Lack of exercise  Exercise:  Weekly    Noni Bhardwaj is a 80 y.o. female who presents to [facility] with a past medical history of    Past Medical History:   Diagnosis Date    A-fib     Arthritis     Cancer Basal cell? cheek    2022    Cataract     Coronary artery disease 2019    AFib    Disease of thyroid gland     Hypothyroidism     Osteopenia     Stroke 2019    TIAs       History of Present Illness  The patient is an 80-year-old female who presents to the office today with concerns about dyslipidemia. She specifically wants to discuss starting a statin medication.    She is considering the initiation of statin therapy and is contemplating increasing her physical activity as a potential alternative. She is also seeking advice on dietary modifications and the possibility of consulting a nutritionist or dietitian. Her current diet includes salads, occasional frozen meals, salmon when dining out, and avocados. She consumes approximately two slices of cheese daily. She has not previously used Metamucil. Her lipid panel results show an HDL of 67, an LDL of 136, and a total cholesterol of 214.    She is currently on Fosamax and is exploring the option of Prolia, having heard about its benefits from a friend. She has no adverse reactions to Fosamax. Her last DEXA scan on 04/10/2025 showed a slight decrease in bone density (less than 1% in lumbar spine and femoral neck) but an improvement in fracture risk.    She reports no recent episodes of fecal incontinence. She has been researching potential treatments for fecal incontinence, including rectal thickening agents, muscle stimulation, and rectal plugs. She was referred to colorectal surgery in 07/2024, but did not ever have a  consultation. She would like to be re-referred at this time.    She is currently undergoing physical therapy for pain management of her hip - referred by her orthopedist.       Objective   Vital Signs:   Vitals:    06/26/25 1312   BP: 128/76   Pulse: 85   Temp: 97.6 °F (36.4 °C)   SpO2: 97%   Weight: 63 kg (139 lb)     Body mass index is 26.28 kg/m².    Wt Readings from Last 3 Encounters:   06/26/25 63 kg (139 lb)   05/30/25 64 kg (141 lb)   04/02/25 64 kg (141 lb)     BP Readings from Last 3 Encounters:   06/26/25 128/76   05/30/25 153/81   04/02/25 150/80       Health Maintenance   Topic Date Due    ZOSTER VACCINE (2 of 2) 12/15/2020    COVID-19 Vaccine (6 - 2024-25 season) 03/20/2025    RSV Vaccine - Adults (1 - 1-dose 75+ series) 01/15/2026 (Originally 3/17/2020)    TDAP/TD VACCINES (1 - Tdap) 01/15/2026 (Originally 3/17/1964)    INFLUENZA VACCINE  07/01/2025    ANNUAL WELLNESS VISIT  03/06/2026    LIPID PANEL  03/21/2026    DXA SCAN  04/10/2027    Pneumococcal Vaccine 50+  Completed       Physical Exam  Vitals reviewed.   Constitutional:       General: She is not in acute distress.     Appearance: She is well-developed and overweight. She is not ill-appearing.   HENT:      Head: Normocephalic and atraumatic.   Eyes:      General: No scleral icterus.        Right eye: No discharge.         Left eye: No discharge.      Extraocular Movements: Extraocular movements intact.      Conjunctiva/sclera: Conjunctivae normal.   Cardiovascular:      Rate and Rhythm: Normal rate and regular rhythm.      Pulses: Normal pulses.      Heart sounds: No murmur heard.  Pulmonary:      Effort: Pulmonary effort is normal.      Breath sounds: Normal breath sounds. No wheezing, rhonchi or rales.   Musculoskeletal:         General: Normal range of motion.      Cervical back: Normal range of motion.      Right lower leg: No edema.      Left lower leg: No edema.   Skin:     General: Skin is warm and dry.   Neurological:      Mental  Status: She is alert and oriented to person, place, and time.   Psychiatric:         Mood and Affect: Mood and affect normal.         Behavior: Behavior normal.         Thought Content: Thought content normal.         Judgment: Judgment normal.            Result Review :  The following data was reviewed by: ISADORA Duarte on 06/26/2025:    No visits with results within 1 Month(s) from this visit.   Latest known visit with results is:   Clinical Support on 04/03/2025   Component Date Value    Rubella Antibodies, IgG 04/03/2025 >33.00     Rubeola IgG 04/03/2025 >300.0     Mumps IgG 04/03/2025 71.1      Common labs          12/26/2024    08:34 1/15/2025    10:42 3/21/2025    08:34   Common Labs   Glucose 84   80    BUN 16   19    Creatinine 0.94   0.94    Sodium 139   138    Potassium 4.4   4.4    Chloride 105   103    Calcium 9.7   9.7    Albumin   3.9    Total Bilirubin   0.3    Alkaline Phosphatase   61    AST (SGOT)   30    ALT (SGPT)   21    WBC  6.98  4.69    Hemoglobin  13.5  12.8    Hematocrit  40.5  39.9    Platelets  252  230    Total Cholesterol   214    Triglycerides   61    HDL Cholesterol   67    LDL Cholesterol    136      CT Chest With Contrast Diagnostic  Result Date: 4/21/2025  Impression: 1.Minor bibasilar atelectasis. 2.Cardiomegaly. Electronically Signed: Roque Patino MD  4/21/2025 11:50 AM EDT  Workstation ID: [id number]    US Head Neck Soft Tissue  Result Date: 4/21/2025  Impression: Nonspecific shadowing nodules within the soft tissues of the neck correspond to the patient's palpable abnormality. Consider CT scan of the neck for further characterization. Electronically Signed: Francis Thomas MD  4/21/2025 11:26 AM EDT  Workstation ID: [id number]    DEXA Bone Density Axial  Result Date: 4/10/2025  Impression: Osteoporosis. Report dictated by: Ban Matamoros  I have personally reviewed this case and agree with the findings above: Electronically Signed: Giuliano Cade MD  4/10/2025 12:34  PM EDT  Workstation ID: [id number]    Mammo Stereotactic Breast Biopsy Initial With & Without Device  Addendum Date: 3/5/2025  Pathology result is available and shows the following: - Intraductal papilloma with microcalcifications - Apocrine metaplasia  This benign result is concordant with imaging findings. Given the finding of an intraductal papilloma, recommend surgical consultation to discuss options of excision versus imaging follow-up.    3/5/2025 8:49 AM by Seble Diaz MD on Workstation: [id number]      Result Date: 3/5/2025  Technically successful stereotactic biopsy of left breast calcifications, with the biopsy clip in expected position. After final pathology has been reviewed, an addendum will be dictated for concordance and further recommendations.  3/4/2025 12:36 PM by Seble Diaz MD on Workstation: [id number]      Mammo Post Device Placement Left  Addendum Date: 3/5/2025  Pathology result is available and shows the following: - Intraductal papilloma with microcalcifications - Apocrine metaplasia  This benign result is concordant with imaging findings. Given the finding of an intraductal papilloma, recommend surgical consultation to discuss options of excision versus imaging follow-up.    3/5/2025 8:49 AM by Seble Diaz MD on Workstation: [email or web address]      Result Date: 3/5/2025  Technically successful stereotactic biopsy of left breast calcifications, with the biopsy clip in expected position. After final pathology has been reviewed, an addendum will be dictated for concordance and further recommendations.  3/4/2025 12:36 PM by Seble Diaz MD on Workstation: [id number]      Mammo Stereotactic Breast Specimen Left  Addendum Date: 3/5/2025  Pathology result is available and shows the following: - Intraductal papilloma with microcalcifications - Apocrine metaplasia  This benign result is concordant with imaging findings. Given the finding of an intraductal papilloma, recommend surgical consultation to  discuss options of excision versus imaging follow-up.    3/5/2025 8:49 AM by Seble Diaz MD on Workstation: [id number]      Result Date: 3/5/2025  Technically successful stereotactic biopsy of left breast calcifications, with the biopsy clip in expected position. After final pathology has been reviewed, an addendum will be dictated for concordance and further recommendations.  3/4/2025 12:36 PM by Seble Diaz MD on Workstation: [id number]        Procedures        Assessment and Plan   Diagnoses and all orders for this visit:    1. Dyslipidemia, goal LDL below 100 (Primary)  -     Apolipoprotein B  -     Lipoprotein A (LPA)    2. Age-related osteoporosis without current pathological fracture    3. Frequent fecal incontinence  -     Ambulatory Referral to Colorectal Surgery        Assessment & Plan  1. Dyslipidemia.  - HDL levels are commendable at 67 mg/dL, while total cholesterol and LDL levels are slightly elevated at 214 mg/dL and 136 mg/dL, respectively.  - The system does not allow calculation of the risk score based on her lipids due to her age.  - Advised to consult with a nutritionist or dietitian through her insurance provider, if available to her, as we do not have a local dietician that we can refer to.  - Prescription for Crestor 5 mg provided but initiation deferred; encouraged to incorporate more fruits and vegetables into her diet, engage in regular exercise, and consider using a weighted vest during walks. Lipoprotein panel ordered today.    2. Osteoporosis.  - Last DEXA scan revealed a marginal decrease in L-spine bone density by <1% and in the femoral neck by <0.5%.  - Fracture risk has improved, indicating the current medication regimen is effective.  - Advised to continue with Fosamax if well-tolerated and effective.  - If she decides to switch to Prolia, she will inform via [employer], and necessary lab work (calcium, magnesium, and phosphorus) will be arranged prior to the infusion.    3. Fecal  incontinence.  - Previously referred to Dr. Taylor Parikh in 07/2024.  - New referral to Dr. Taylor Parikh, a colorectal surgeon, will be made today.                FOLLOW UP  Return in about 2 months (around 9/8/2025) for Next scheduled follow up, medication refills and fasting labs.    Patient was given instructions and counseling regarding her condition or for health maintenance advice. Please see specific information pulled into the AVS if appropriate.       ISADORA Duarte  06/26/25  13:47 EDT    CURRENT & DISCONTINUED MEDICATIONS  Current Outpatient Medications   Medication Instructions    alendronate (FOSAMAX) 70 mg, Oral, Every 7 Days    dofetilide (TIKOSYN) 500 mcg, 2 Times Daily    Eliquis 5 mg, 2 Times Daily    ferrous sulfate 325 mg, Oral, Daily With Breakfast    levothyroxine (SYNTHROID, LEVOTHROID) 75 mcg, Oral, Daily       There are no discontinued medications.     Patient or patient representative verbalized consent for the use of Ambient Listening during the visit with  ISADORA Duarte for chart documentation. 6/26/2025  13:36 EDT

## 2025-06-26 NOTE — PROGRESS NOTES
..  Venipuncture Blood Specimen Collection  Venipuncture performed in LT arm by Diana Herman MA with good hemostasis. Patient tolerated the procedure well without complications.   06/26/25   Diana Herman MA

## 2025-06-27 ENCOUNTER — RESULTS FOLLOW-UP (OUTPATIENT)
Dept: FAMILY MEDICINE CLINIC | Facility: CLINIC | Age: 80
End: 2025-06-27
Payer: MEDICARE

## 2025-06-27 DIAGNOSIS — E78.5 DYSLIPIDEMIA, GOAL LDL BELOW 100: ICD-10-CM

## 2025-06-27 DIAGNOSIS — E78.41 ELEVATED LIPOPROTEIN(A): Primary | ICD-10-CM

## 2025-06-27 LAB — LPA SERPL-SCNC: 154.9 NMOL/L

## 2025-06-27 RX ORDER — ROSUVASTATIN CALCIUM 5 MG/1
5 TABLET, COATED ORAL DAILY
Qty: 90 TABLET | Refills: 0 | Status: SHIPPED | OUTPATIENT
Start: 2025-06-27

## 2025-06-28 LAB — APO B SERPL-MCNC: 89 MG/DL

## 2025-07-12 ENCOUNTER — APPOINTMENT (OUTPATIENT)
Dept: MRI IMAGING | Facility: HOSPITAL | Age: 80
End: 2025-07-12
Payer: MEDICARE

## 2025-07-12 ENCOUNTER — HOSPITAL ENCOUNTER (EMERGENCY)
Facility: HOSPITAL | Age: 80
Discharge: HOME | End: 2025-07-12
Attending: EMERGENCY MEDICINE
Payer: MEDICARE

## 2025-07-12 VITALS
HEART RATE: 60 BPM | OXYGEN SATURATION: 95 % | DIASTOLIC BLOOD PRESSURE: 77 MMHG | BODY MASS INDEX: 24.84 KG/M2 | HEIGHT: 62 IN | TEMPERATURE: 97.6 F | SYSTOLIC BLOOD PRESSURE: 146 MMHG | WEIGHT: 135 LBS | RESPIRATION RATE: 18 BRPM

## 2025-07-12 DIAGNOSIS — R20.2 PARESTHESIA OF RIGHT LOWER EXTREMITY: Primary | ICD-10-CM

## 2025-07-12 PROCEDURE — 99284 EMERGENCY DEPT VISIT MOD MDM: CPT

## 2025-07-12 PROCEDURE — 70551 MRI BRAIN STEM W/O DYE: CPT

## 2025-07-12 RX ORDER — PREDNISONE 50 MG/1
50 TABLET ORAL DAILY
Qty: 5 TABLET | Refills: 0 | Status: SHIPPED | OUTPATIENT
Start: 2025-07-12 | End: 2025-07-12

## 2025-07-12 RX ORDER — PREDNISONE 50 MG/1
50 TABLET ORAL DAILY
Qty: 5 TABLET | Refills: 0 | Status: SHIPPED | OUTPATIENT
Start: 2025-07-12 | End: 2025-07-17

## 2025-07-12 NOTE — ED PROVIDER NOTES
"Time: 10:55 AM EDT  Date of encounter:  7/12/2025  Independent Historian/Clinical History and Information was obtained by:   Patient    History is limited by: N/A    Chief Complaint: Right leg numbness      History of Present Illness:  Patient is a 80 y.o. year old female who presents to the emergency department for evaluation of right leg numbness.  Patient states \"last night I think I had a TIA\".  She has no headache, dizziness, speech or vision changes.  No confusion.  Her isolated symptom is onset of right leg numbness/tingling since 9 PM last night.  She denies weakness.  She denies back or hip pain.  There is no positional component.  She presents today concern for TIA/stroke.  She does note that while the numbness does affect seemingly the entire leg there are certainly areas that it is worse.  She states \"the back of my thigh and down your my ankle\" are the areas where the numbness is most prevalent.  Currently here in the emergency department the symptoms have lessened since the start of this issue last night but they have never gone away.  She states that it feels like \"if I had sat with my legs crossed\" symptoms currently worse at the bottom of the foot and ankle.      Patient Care Team  Primary Care Provider: Lucy Wayne APRN    Past Medical History:     No Known Allergies  Past Medical History:   Diagnosis Date    A-fib     Arthritis     Cancer Basal cell? cheek    2022    Cataract     Coronary artery disease 2019    AFib    Disease of thyroid gland     Hypothyroidism     Osteopenia     Stroke 2019    TIAs     Past Surgical History:   Procedure Laterality Date    APPENDECTOMY      BACK SURGERY      CARDIAC ABLATION  02/13/2024    CARDIAC CATHETERIZATION  2019    Loop inserted    CARDIAC SURGERY  2.13.24    Ablation    COLONOSCOPY      EYE SURGERY  2017    Sadi cataracts    HYSTERECTOMY      SPINE SURGERY  2013    Lumbar laminectomy    SUBTOTAL HYSTERECTOMY      TONSILLECTOMY       Family " History   Problem Relation Age of Onset    Heart disease Mother     Vision loss Mother         Maculardegeneration/legally blind    COPD Father     Heart disease Father     Mental illness Sister         bipolar    No Known Problems Son     Colon cancer Neg Hx        Home Medications:  Prior to Admission medications    Medication Sig Start Date End Date Taking? Authorizing Provider   alendronate (FOSAMAX) 70 MG tablet Take 1 tablet by mouth Every 7 (Seven) Days. 3/6/25   Lucy Wayne APRN   dofetilide (TIKOSYN) 500 MCG capsule Take 1 capsule by mouth 2 (Two) Times a Day. 7/22/21 3/6/25  Refugio Arteaga MD   Eliquis 5 MG tablet tablet Take 1 tablet by mouth 2 (Two) Times a Day. 4/10/21   Refugio Arteaga MD   ferrous sulfate 325 (65 FE) MG tablet Take 1 tablet by mouth Daily With Breakfast. 3/6/25   Lucy Wayne APRN   levothyroxine (SYNTHROID, LEVOTHROID) 75 MCG tablet TAKE 1 TABLET BY MOUTH EVERY DAY 6/11/25   Chi Jasso MD   rosuvastatin (Crestor) 5 MG tablet Take 1 tablet by mouth Daily. 6/27/25   Lucy Wayne APRN        Social History:   Social History     Tobacco Use    Smoking status: Never     Passive exposure: Never    Smokeless tobacco: Never   Vaping Use    Vaping status: Never Used   Substance Use Topics    Alcohol use: Not Currently     Alcohol/week: 2.0 standard drinks of alcohol     Comment: Once a month    Drug use: Never         Review of Systems:  Review of Systems   Constitutional:  Negative for chills and fever.   HENT:  Negative for congestion, rhinorrhea and sore throat.    Eyes:  Negative for photophobia.   Respiratory:  Negative for apnea, cough, chest tightness and shortness of breath.    Cardiovascular:  Negative for chest pain and palpitations.   Gastrointestinal:  Negative for abdominal pain, diarrhea, nausea and vomiting.   Endocrine: Negative.    Genitourinary:  Negative for difficulty urinating and dysuria.   Musculoskeletal:  Negative  "for back pain, joint swelling and myalgias.   Skin:  Negative for color change and wound.   Allergic/Immunologic: Negative.    Neurological:  Positive for numbness. Negative for seizures and headaches.   Psychiatric/Behavioral: Negative.     All other systems reviewed and are negative.       Physical Exam:  /77   Pulse 60   Temp 97.6 °F (36.4 °C)   Resp 18   Ht 157.5 cm (62\")   Wt 61.2 kg (135 lb)   SpO2 95%   BMI 24.69 kg/m²     Physical Exam  Vitals and nursing note reviewed.   Constitutional:       General: She is awake.      Appearance: Normal appearance.   HENT:      Head: Normocephalic and atraumatic.      Nose: Nose normal.      Mouth/Throat:      Mouth: Mucous membranes are moist.   Eyes:      Extraocular Movements: Extraocular movements intact.      Pupils: Pupils are equal, round, and reactive to light.   Cardiovascular:      Rate and Rhythm: Normal rate and regular rhythm.      Heart sounds: Normal heart sounds.   Pulmonary:      Effort: Pulmonary effort is normal. No respiratory distress.      Breath sounds: Normal breath sounds. No wheezing, rhonchi or rales.   Abdominal:      General: Bowel sounds are normal.      Palpations: Abdomen is soft.      Tenderness: There is no abdominal tenderness. There is no guarding or rebound.      Comments: No rigidity   Musculoskeletal:         General: No tenderness. Normal range of motion.      Cervical back: Normal range of motion and neck supple.   Skin:     General: Skin is warm and dry.      Coloration: Skin is not jaundiced.   Neurological:      General: No focal deficit present.      Mental Status: She is alert. Mental status is at baseline.   Psychiatric:         Mood and Affect: Mood normal.                    Medical Decision Making:      Comorbidities that affect care:    Atrial fibrillation, CAD, CVA, thyroid disease    External Notes reviewed:    Previous Clinic Note: Orthopedics office visit 5/30/2025 with Dr. Ngo.  Description: " Right hip pain, tendinitis, trochanteric bursitis of right hip      The following orders were placed and all results were independently analyzed by me:  Orders Placed This Encounter   Procedures    MRI Brain Without Contrast       Medications Given in the Emergency Department:  Medications - No data to display     ED Course:         Labs:    Lab Results (last 24 hours)       ** No results found for the last 24 hours. **             Imaging:    MRI Brain Without Contrast  Result Date: 7/12/2025  MRI BRAIN WO CONTRAST Date of Exam: 7/12/2025 1:21 PM EDT Indication: Right-sided numbness.  Comparison: None available. Technique:  Routine multiplanar/multisequence sequence images of the brain were obtained without contrast administration. Findings: There is no restricted diffusion to indicate acute ischemia. There is chronic volume loss with enlargement the sulci, fissures, ventricles, basal cisterns. There is abnormal signal in the periventricular white matter felt to represent chronic microvascular ischemia. There is no acute hemorrhage, midline shift, or suspicious extra-axial fluid collections. There are normal signal voids within the intracranial arteries and the major dural sinuses. There is thinning of the lenses which can be seen with previous cataract surgery. The visualized paranasal and mastoid sinuses are clear.     Impression: 1.No acute findings. 2.Volume loss secondary to cerebral atrophy. 3.Chronic ischemic changes. Electronically Signed: Duran Garcia MD  7/12/2025 1:58 PM EDT  Workstation ID: [id number]        Differential Diagnosis and Discussion:    Paresthesia: Differential diagnosis includes but is not limited to acute stroke, electrolyte imbalance, anxiety, radiculopathy, autoimmune disorders, and endocrine disorders.    PROCEDURES:    MRI was performed in the emergency department and the MRI impression was interpreted by me.     No orders to display       Procedures    MDM                     Patient  Care Considerations:    LABS: I considered ordering labs, however patient has no systemic complaints      Consultants/Shared Management Plan:    None    Social Determinants of Health:    Patient is independent, reliable, and has access to care.       Disposition and Care Coordination:    Discharged: The patient is suitable and stable for discharge with no need for consideration of admission.    I have explained the patient´s condition, diagnoses and treatment plan based on the information available to me at this time. I have answered questions and addressed any concerns. The patient has a good  understanding of the patient´s diagnosis, condition, and treatment plan as can be expected at this point. The vital signs have been stable. The patient´s condition is stable and appropriate for discharge from the emergency department.      The patient will pursue further outpatient evaluation with the primary care physician or other designated or consulting physician as outlined in the discharge instructions. They are agreeable to this plan of care and follow-up instructions have been explained in detail. The patient has received these instructions in written format and has expressed an understanding of the discharge instructions. The patient is aware that any significant change in condition or worsening of symptoms should prompt an immediate return to this or the closest emergency department or call to 911.    Final diagnoses:   Paresthesia of right lower extremity        ED Disposition       ED Disposition   Discharge    Condition   Stable    Comment   --               This medical record created using voice recognition software.             Tavares Herrmann MD  07/12/25 [id number]

## 2025-07-16 ENCOUNTER — TELEPHONE (OUTPATIENT)
Dept: FAMILY MEDICINE CLINIC | Facility: CLINIC | Age: 80
End: 2025-07-16
Payer: MEDICARE

## 2025-07-16 ENCOUNTER — PATIENT MESSAGE (OUTPATIENT)
Dept: FAMILY MEDICINE CLINIC | Facility: CLINIC | Age: 80
End: 2025-07-16
Payer: MEDICARE

## 2025-07-16 ENCOUNTER — OFFICE VISIT (OUTPATIENT)
Dept: SURGERY | Facility: CLINIC | Age: 80
End: 2025-07-16
Payer: MEDICARE

## 2025-07-16 VITALS
BODY MASS INDEX: 25.88 KG/M2 | SYSTOLIC BLOOD PRESSURE: 142 MMHG | DIASTOLIC BLOOD PRESSURE: 84 MMHG | HEART RATE: 75 BPM | WEIGHT: 140.6 LBS | OXYGEN SATURATION: 97 % | HEIGHT: 62 IN

## 2025-07-16 DIAGNOSIS — R15.2 INCONTINENCE OF FECES WITH FECAL URGENCY: Primary | ICD-10-CM

## 2025-07-16 DIAGNOSIS — R15.9 INCONTINENCE OF FECES WITH FECAL URGENCY: Primary | ICD-10-CM

## 2025-07-16 NOTE — PROGRESS NOTES
History of Present Illness  The patient is an 80-year-old female presenting as a new patient for evaluation of fecal incontinence.    She reports experiencing fecal incontinence without associated pain or bleeding. She manages her condition by waking up approximately 3 hours early to ensure she has a bowel movement before leaving the house. This routine allows her to avoid accidents outside the home, although she has had some close calls. She has been considering the use of a plug but is unsure about its effectiveness and potential complications. She has not noticed a worsening of her symptoms over time but has become more cautious about passing gas, especially around her grandchildren. She occasionally uses Beano to manage gas but is uncertain of its effectiveness. She does not experience abdominal pain or straining during bowel movements. Her stool consistency varies, and she sometimes experiences unexpected bowel movements, such as this morning when she went to urinate and found a small amount of stool in the toilet. She has not taken fiber supplements. She has previously tried pelvic floor physical therapy but did not find it helpful; of note, she is a physical therapist. She has given birth vaginally without any tearing. Her mother also experienced similar fecal incontinence symptoms. She has been dealing with this issue for at least 10 years and takes Imodium every other day, which she finds helpful.    She underwent a colonoscopy in May 2016, which was normal. Indications at that time were diarrhea and fecal incontinence.    FAMILY HISTORY  Her mother had the same urgency issues.    MEDICATIONS  Imodium, Beano     Past Medical History:   Diagnosis Date    A-fib     Abnormal ECG 2018    Afib    Arthritis     Arthritis of back 2024    Bursitis of hip     Cancer Basal cell? cheek    2022    Cataract     Coronary artery disease 2019    AFib    Disease of thyroid gland     Hip arthrosis     Occasionally     Hyperlipidemia Jan 2025    Hypothyroidism     Lumbosacral disc disease 2011    Laminectomy    Osteopenia     Rotator cuff syndrome     Surgery    Stroke 2019    TIAs    TIA (transient ischemic attack) 2019       Past Surgical History:   Procedure Laterality Date    APPENDECTOMY      BACK SURGERY      CARDIAC ABLATION  02/13/2024    CARDIAC CATHETERIZATION  2019    Loop inserted    CARDIAC SURGERY  2.13.24    Ablation    COLONOSCOPY      EYE SURGERY  2017    Sadi cataracts    HYSTERECTOMY      SHOULDER SURGERY  2021    SPINE SURGERY  2013    Lumbar laminectomy    SUBTOTAL HYSTERECTOMY      TONSILLECTOMY         Social History:   reports that she has never smoked. She has never been exposed to tobacco smoke. She has never used smokeless tobacco. She reports that she does not currently use alcohol after a past usage of about 2.0 standard drinks of alcohol per week. She reports that she does not use drugs.      Marriage status:     Family History   Problem Relation Age of Onset    Heart disease Mother     Vision loss Mother         Maculardegeneration/legally blind    COPD Father     Heart disease Father     Mental illness Sister         bipolar    No Known Problems Son     Colon cancer Neg Hx          Current Outpatient Medications:     alendronate (FOSAMAX) 70 MG tablet, Take 1 tablet by mouth Every 7 (Seven) Days., Disp: 12 tablet, Rfl: 1    dofetilide (TIKOSYN) 500 MCG capsule, Take 1 capsule by mouth 2 (Two) Times a Day., Disp: , Rfl:     Eliquis 5 MG tablet tablet, Take 1 tablet by mouth 2 (Two) Times a Day., Disp: , Rfl:     ferrous sulfate 325 (65 FE) MG tablet, Take 1 tablet by mouth Daily With Breakfast., Disp: 90 tablet, Rfl: 1    levothyroxine (SYNTHROID, LEVOTHROID) 75 MCG tablet, TAKE 1 TABLET BY MOUTH EVERY DAY, Disp: 90 tablet, Rfl: 0    predniSONE (DELTASONE) 50 MG tablet, Take 1 tablet by mouth Daily for 5 days., Disp: 5 tablet, Rfl: 0    rosuvastatin (Crestor) 5 MG tablet, Take 1 tablet by mouth  Daily., Disp: 90 tablet, Rfl: 0    Allergy  Patient has no known allergies.    Vitals:    07/16/25 1026   BP: 142/84   Pulse: 75   SpO2: 97%   -  Body mass index is 25.72 kg/m².    Physical Exam  No acute distress  Chaperone present  Perianal exam: external hemorrhoids not enlarged. LAMBERT: poor tone, no masses. Anoscopy performed: Grade 0-1 x 2 internal hemorrhoids     Assessment & Plan  1. Fecal incontinence.  Her fecal incontinence is likely due to a combination of genetic predisposition, anatomical factors, activity level, childbirth history, and age-related muscle tone weakening. She is very interested in the use of a plug. A colonoscopy can be considered for further evaluation. Over-the-counter fiber supplements were suggested, starting with one dose per day for a week, and gradually increasing the dose as tolerated. She was advised to continue her regular Imodium regimen but to reduce the dosage if she experiences harder stools. A handout with detailed fiber instructions was provided.  An appointment with Dr. Parikh was made for further discussion on potential treatment options.    Follow-up  A follow-up visit is scheduled in 4 to 6 weeks.       Patient or patient representative verbalized consent for the use of Ambient Listening during the visit with  Bethany De Leon PA-C for chart documentation. 7/16/2025  10:57 EDT    Bethany De Leon PA-C  Physician Assistant  Colorectal Surgery

## 2025-07-17 ENCOUNTER — OFFICE VISIT (OUTPATIENT)
Dept: FAMILY MEDICINE CLINIC | Facility: CLINIC | Age: 80
End: 2025-07-17
Payer: MEDICARE

## 2025-07-17 ENCOUNTER — PATIENT ROUNDING (BHMG ONLY) (OUTPATIENT)
Dept: SURGERY | Facility: CLINIC | Age: 80
End: 2025-07-17
Payer: MEDICARE

## 2025-07-17 VITALS
TEMPERATURE: 98.6 F | HEIGHT: 62 IN | WEIGHT: 138.8 LBS | OXYGEN SATURATION: 97 % | SYSTOLIC BLOOD PRESSURE: 136 MMHG | HEART RATE: 82 BPM | BODY MASS INDEX: 25.54 KG/M2 | DIASTOLIC BLOOD PRESSURE: 72 MMHG

## 2025-07-17 DIAGNOSIS — M79.661 PAIN OF RIGHT LOWER LEG: ICD-10-CM

## 2025-07-17 DIAGNOSIS — Z09 ENCOUNTER FOR EXAMINATION FOLLOWING TREATMENT AT HOSPITAL: Primary | ICD-10-CM

## 2025-07-17 DIAGNOSIS — E78.5 DYSLIPIDEMIA, GOAL LDL BELOW 100: ICD-10-CM

## 2025-07-17 DIAGNOSIS — R15.9 FREQUENT FECAL INCONTINENCE: ICD-10-CM

## 2025-07-17 DIAGNOSIS — E78.41 ELEVATED LIPOPROTEIN(A): ICD-10-CM

## 2025-07-17 DIAGNOSIS — M51.361 DEGENERATION OF INTERVERTEBRAL DISC OF LUMBAR REGION WITH LOWER EXTREMITY PAIN: ICD-10-CM

## 2025-07-17 PROBLEM — M47.26 OTHER SPONDYLOSIS WITH RADICULOPATHY, LUMBAR REGION: Status: ACTIVE | Noted: 2024-09-12

## 2025-07-17 PROBLEM — R94.09 ABNORMAL TILT TABLE TEST: Status: ACTIVE | Noted: 2020-09-08

## 2025-07-17 PROBLEM — I48.19 PERSISTENT ATRIAL FIBRILLATION: Status: ACTIVE | Noted: 2024-02-20

## 2025-07-17 PROBLEM — R79.89 ELEVATED TROPONIN: Status: ACTIVE | Noted: 2024-02-19

## 2025-07-17 PROBLEM — I10 PRIMARY HYPERTENSION: Status: ACTIVE | Noted: 2024-02-19

## 2025-07-17 PROBLEM — I48.0 PAROXYSMAL ATRIAL FIBRILLATION: Status: ACTIVE | Noted: 2020-05-15

## 2025-07-17 PROBLEM — M18.0 OSTEOARTHRITIS OF CARPOMETACARPAL (CMC) JOINT OF BOTH THUMBS: Status: ACTIVE | Noted: 2025-07-17

## 2025-07-17 PROBLEM — Z95.818 STATUS POST PLACEMENT OF IMPLANTABLE LOOP RECORDER: Status: ACTIVE | Noted: 2020-05-15

## 2025-07-17 PROBLEM — Z79.01 CHRONIC ANTICOAGULATION: Status: ACTIVE | Noted: 2020-06-19

## 2025-07-17 PROBLEM — G45.9 TIA (TRANSIENT ISCHEMIC ATTACK): Status: ACTIVE | Noted: 2020-05-15

## 2025-07-17 PROBLEM — R06.02 SHORTNESS OF BREATH: Status: ACTIVE | Noted: 2024-02-19

## 2025-07-17 PROBLEM — J90 PLEURAL EFFUSION: Status: ACTIVE | Noted: 2024-02-19

## 2025-07-17 PROBLEM — R55 SYNCOPE: Status: ACTIVE | Noted: 2020-05-15

## 2025-07-17 NOTE — PROGRESS NOTES
Chief Complaint  Hospital Follow Up Visit (Pt f/u [facility] ER f/u from 7/12/25 for Rt lower leg tingling/numbness)    Primary Care Follow-Up  Treatment compliance:  All of the time  Treatment barriers:  Medication side effects  Exercise:  Rarely    Noni Bhardwaj is a 80 y.o. female who presents to [facility] with a past medical history of    Past Medical History:   Diagnosis Date    A-fib     Abnormal ECG 2018    Afib    Arthritis     Arthritis of back 2024    Bursitis of hip     Cancer Basal cell? cheek    2022    Cataract     Coronary artery disease 2019    AFib    Disease of thyroid gland     Hip arthrosis     Occasionally    Hyperlipidemia Jan 2025    Hypothyroidism     Lumbosacral disc disease 2011    Laminectomy    Osteopenia     Primary hypertension 2/19/2024    Rotator cuff syndrome     Surgery    Stroke 2019    TIAs    TIA (transient ischemic attack) 2019       History of Present Illness  The patient is an 80-year-old female who presents to the office today for an ER follow-up. She went to the emergency room at [facility] on 07/12/2025 due to right leg numbness. When she presented, she reported feeling as if she might have had a TIA. She did not experience any headache, dizziness, speech or vision changes, or confusion. The symptoms began at 9 PM on 07/11/2025. She also did not report any weakness, back pain, or hip pain. She was discharged home with a course of prednisone. No labs were obtained during the ER visit.    She experienced numbness in her right leg upon waking up on Saturday, similar to the sensation she had during a TIA episode in the same leg approximately 4 years ago. Despite walking around, the numbness persisted. An MRI of the brain without contrast was performed and showed no acute findings. There was volume loss secondary to cerebral atrophy noted, as well as chronic ischemic changes. No blood work was done. The attending physician  "suggested that the issue might be related to her back. She considered the possibility that her new medication, Crestor, which she started a week ago, could be causing the numbness, but she dismissed this idea as unlikely.     She continues to experience numbness in her leg, describing it as a tingling sensation internally rather than external numbness. She has not undergone an EMG test. She reports no swelling in her leg and does not experience any calf pain. She did not take Crestor last night but still woke up with the numbness this morning.    She has a history of back issues and has undergone an MRI of her back. A neurologist diagnosed her with a bulging disc and advised against twisting and lifting movements.    She is getting follow-up imaging for intraductal papilloma in 6 months.    She visited colorectal surgery yesterday and they offered her a nerve stimulator. She is going to go back in 4 weeks and talk to them again because she is interested in anal plug.      Objective   Vital Signs:   Vitals:    07/17/25 1425   BP: 136/72   BP Location: Left arm   Patient Position: Sitting   Cuff Size: Adult   Pulse: 82   Temp: 98.6 °F (37 °C)   TempSrc: Temporal   SpO2: 97%   Weight: 63 kg (138 lb 12.8 oz)   Height: 157.5 cm (62\")   PainSc: 0-No pain     Body mass index is 25.39 kg/m².    Wt Readings from Last 3 Encounters:   07/17/25 63 kg (138 lb 12.8 oz)   07/16/25 63.8 kg (140 lb 9.6 oz)   07/12/25 61.2 kg (135 lb)     BP Readings from Last 3 Encounters:   07/17/25 136/72   07/16/25 142/84   07/12/25 146/77       Health Maintenance   Topic Date Due    RSV Vaccine - Adults (1 - 1-dose 75+ series) 01/15/2026 (Originally 3/17/2020)    TDAP/TD VACCINES (1 - Tdap) 01/15/2026 (Originally 3/17/1964)    COVID-19 Vaccine (6 - 2024-25 season) 07/17/2026 (Originally 3/20/2025)    ZOSTER VACCINE (2 of 2) 07/17/2026 (Originally 12/15/2020)    INFLUENZA VACCINE  10/01/2025    ANNUAL WELLNESS VISIT  03/06/2026    LIPID PANEL  " 03/21/2026    DXA SCAN  04/10/2027    Pneumococcal Vaccine 50+  Completed       Physical Exam  Vitals reviewed.   Constitutional:       General: She is not in acute distress.     Appearance: Normal appearance. She is well-developed. She is not ill-appearing.   HENT:      Head: Normocephalic and atraumatic.   Eyes:      General: No scleral icterus.        Right eye: No discharge.         Left eye: No discharge.      Extraocular Movements: Extraocular movements intact.      Conjunctiva/sclera: Conjunctivae normal.   Cardiovascular:      Rate and Rhythm: Normal rate and regular rhythm.      Pulses: Normal pulses.      Heart sounds: No murmur heard.  Pulmonary:      Effort: Pulmonary effort is normal.      Breath sounds: Normal breath sounds. No wheezing, rhonchi or rales.   Musculoskeletal:         General: Normal range of motion.      Cervical back: Normal range of motion.      Right lower leg: No edema.      Left lower leg: No edema.   Skin:     General: Skin is warm and dry.   Neurological:      Mental Status: She is alert and oriented to person, place, and time.      Sensory: No sensory deficit.      Motor: No weakness.      Gait: Gait normal.   Psychiatric:         Mood and Affect: Mood and affect normal.         Behavior: Behavior normal.         Thought Content: Thought content normal.         Judgment: Judgment normal.         Result Review :  The following data was reviewed by: ISADORA Duarte on 07/17/2025:    Office Visit on 06/26/2025   Component Date Value    Apolipoprotein B 06/26/2025 89     Lipoprotein (a) 06/26/2025 154.9 (H)      Common labs          12/26/2024    08:34 1/15/2025    10:42 3/21/2025    08:34   Common Labs   Glucose 84   80    BUN 16   19    Creatinine 0.94   0.94    Sodium 139   138    Potassium 4.4   4.4    Chloride 105   103    Calcium 9.7   9.7    Albumin   3.9    Total Bilirubin   0.3    Alkaline Phosphatase   61    AST (SGOT)   30    ALT (SGPT)   21    WBC  6.98  4.69     Hemoglobin  13.5  12.8    Hematocrit  40.5  39.9    Platelets  252  230    Total Cholesterol   214    Triglycerides   61    HDL Cholesterol   67    LDL Cholesterol    136      MRI Brain Without Contrast  Result Date: 7/12/2025  Impression: 1.No acute findings. 2.Volume loss secondary to cerebral atrophy. 3.Chronic ischemic changes. Electronically Signed: Duran Garcia MD  7/12/2025 1:58 PM EDT  Workstation ID: [id number]    CT Chest With Contrast Diagnostic  Result Date: 4/21/2025  Impression: 1.Minor bibasilar atelectasis. 2.Cardiomegaly. Electronically Signed: Roque Patino MD  4/21/2025 11:50 AM EDT  Workstation ID: [id number]    US Head Neck Soft Tissue  Result Date: 4/21/2025  Impression: Nonspecific shadowing nodules within the soft tissues of the neck correspond to the patient's palpable abnormality. Consider CT scan of the neck for further characterization. Electronically Signed: Francis Thomas MD  4/21/2025 11:26 AM EDT  Workstation ID: [id number]    DEXA Bone Density Axial  Result Date: 4/10/2025  Impression: Osteoporosis. Report dictated by: Ban Matamoros  I have personally reviewed this case and agree with the findings above: Electronically Signed: Giuliano Cade MD  4/10/2025 12:34 PM EDT  Workstation ID: [id number]    XR Hip With or Without Pelvis 2 - 3 View Right (05/30/2025 10:55)   IMAGING SCANNED (08/06/2024)   MRI Lumbar Spine Without Contrast (08/13/2024 17:08)     ED with Tavares Herrmann MD (07/12/2025)   MRI Brain Without Contrast (07/12/2025 13:43)     Office Visit with Bethany De Leon PA-C (07/16/2025)     Procedures        Assessment and Plan   Diagnoses and all orders for this visit:    1. Encounter for examination following treatment at hospital (Primary)    2. Degeneration of intervertebral disc of lumbar region with lower extremity pain    3. Pain of right lower leg    4. Dyslipidemia, goal LDL below 100    5. Elevated lipoprotein(a)    6. Frequent fecal incontinence        Assessment &  Plan  1. Right leg numbness.  - The patient's right leg numbness could be attributed to neuropathy or radiculopathy, potentially linked to her spinal condition.  - The MRI of her brain did not reveal any significant abnormalities. The possibility of a DVT was also considered.  - An EMG nerve conduction study was proposed to further investigate the cause of her symptoms. She will inform us of her decision regarding this test.    2. Degenerative disk and facet disease.  - The MRI of her lumbar spine showed degenerative disk and facet disease throughout the lumbar spine with multilevel spondylosis, central stenosis most severe at L4-L5, and an asymmetric disk osteophyte complex on the right at L5-S1.  - This could be contributing to her right leg numbness.  - She was advised to avoid twisting and lifting as previously recommended by her neurologist.  - No back pain reported, only leg pain.    3. Intraductal papilloma.  - She is scheduled for follow-up imaging in 6 months for her intraductal papilloma.  - She has received the orders but has not yet completed the imaging.  - Review of records indicates previous consultations and second opinions.  - Routine follow-up planned for September.    4. Fecal Incontinence.  - She will hold off on taking Crestor for a week and start taking Metamucil capsules, beginning with one capsule daily for a week.  - If no improvement is observed, she will increase the dosage to two capsules daily.  - Metamucil may also help with cholesterol management.  - Counseling provided on the use of Metamucil capsules and potential benefits.           FOLLOW UP  Return in about 7 weeks (around 9/3/2025) for Next scheduled follow up, medication refills and fasting labs.    Patient was given instructions and counseling regarding her condition or for health maintenance advice. Please see specific information pulled into the AVS if appropriate.       Lucy Wayne, APRN  07/17/25  15:23  EDT    CURRENT & DISCONTINUED MEDICATIONS  Current Outpatient Medications   Medication Instructions    alendronate (FOSAMAX) 70 mg, Oral, Every 7 Days    dofetilide (TIKOSYN) 500 mcg, 2 Times Daily    Eliquis 5 mg, 2 Times Daily    ferrous sulfate 325 mg, Oral, Daily With Breakfast    levothyroxine (SYNTHROID, LEVOTHROID) 75 mcg, Oral, Daily    predniSONE (DELTASONE) 50 mg, Oral, Daily    rosuvastatin (CRESTOR) 5 mg, Oral, Daily       There are no discontinued medications.     Patient or patient representative verbalized consent for the use of Ambient Listening during the visit with  ISADORA Duarte for chart documentation. 7/17/2025  14:48 EDT

## 2025-07-17 NOTE — PROGRESS NOTES
July 17, 2025    Hello, may I speak with Noni Bhardwaj?    My name is Paddy      I am  with [facility]  [address] [facility] [address]  [facility] [address] [phone].    Before we get started may I verify your date of birth? 1945    I am calling to officially welcome you to our practice and ask about your recent visit. Is this a good time to talk? yes    Tell me about your visit with us. What things went well?  everything went well.        We're always looking for ways to make our patients' experiences even better. Do you have recommendations on ways we may improve?  no    Overall were you satisfied with your first visit to our practice? yes       I appreciate you taking the time to speak with me today. Is there anything else I can do for you? no      Thank you, and have a great day.

## 2025-07-18 ENCOUNTER — PATIENT MESSAGE (OUTPATIENT)
Dept: FAMILY MEDICINE CLINIC | Facility: CLINIC | Age: 80
End: 2025-07-18
Payer: MEDICARE

## 2025-07-18 DIAGNOSIS — M79.661 PAIN OF RIGHT LOWER LEG: ICD-10-CM

## 2025-07-18 DIAGNOSIS — M51.361 DEGENERATION OF INTERVERTEBRAL DISC OF LUMBAR REGION WITH LOWER EXTREMITY PAIN: Primary | ICD-10-CM

## 2025-07-28 ENCOUNTER — HOSPITAL ENCOUNTER (OUTPATIENT)
Facility: HOSPITAL | Age: 80
Discharge: HOME | End: 2025-07-28
Admitting: NURSE PRACTITIONER
Payer: MEDICARE

## 2025-07-28 DIAGNOSIS — M79.661 PAIN OF RIGHT LOWER LEG: ICD-10-CM

## 2025-07-28 DIAGNOSIS — M51.361 DEGENERATION OF INTERVERTEBRAL DISC OF LUMBAR REGION WITH LOWER EXTREMITY PAIN: ICD-10-CM

## 2025-07-28 PROCEDURE — 95886 MUSC TEST DONE W/N TEST COMP: CPT

## 2025-07-28 PROCEDURE — 95908 NRV CNDJ TST 3-4 STUDIES: CPT

## 2025-07-29 ENCOUNTER — RESULTS FOLLOW-UP (OUTPATIENT)
Dept: FAMILY MEDICINE CLINIC | Facility: CLINIC | Age: 80
End: 2025-07-29
Payer: MEDICARE

## 2025-07-29 NOTE — LETTER
Noni Bhardwaj  [address]  [place] [address]    August 1, 2025     Dear Ms. Bhardwaj:    Ms. Benton,      Your EMG/NCS of the right lower extremity is normal. It does not explain your symptoms. Are they ongoing? Or have they improved?   Below are the results from your recent visit:    Resulted Orders   EMG & Nerve Conduction Test    Narrative    See attached EMG/NCS report.     Electronically signed by Deven Doe PT, 07/29/25, 8:42 AM EDT.                 Sincerely,        Lucy Wayne, APRN

## 2025-08-28 ENCOUNTER — OFFICE VISIT (OUTPATIENT)
Dept: ORTHOPEDIC SURGERY | Facility: CLINIC | Age: 80
End: 2025-08-28
Payer: MEDICARE

## 2025-08-28 VITALS
DIASTOLIC BLOOD PRESSURE: 83 MMHG | OXYGEN SATURATION: 96 % | WEIGHT: 138 LBS | BODY MASS INDEX: 25.4 KG/M2 | SYSTOLIC BLOOD PRESSURE: 161 MMHG | HEART RATE: 71 BPM | HEIGHT: 62 IN

## 2025-08-28 DIAGNOSIS — M76.891 RIGHT HIP TENDONITIS: Primary | ICD-10-CM

## 2025-08-28 DIAGNOSIS — M54.16 LUMBAR RADICULOPATHY: ICD-10-CM

## 2025-08-28 DIAGNOSIS — M70.61 TROCHANTERIC BURSITIS OF RIGHT HIP: ICD-10-CM
